# Patient Record
Sex: MALE | Race: WHITE | NOT HISPANIC OR LATINO
[De-identification: names, ages, dates, MRNs, and addresses within clinical notes are randomized per-mention and may not be internally consistent; named-entity substitution may affect disease eponyms.]

---

## 2017-01-04 ENCOUNTER — APPOINTMENT (OUTPATIENT)
Dept: PULMONOLOGY | Facility: CLINIC | Age: 27
End: 2017-01-04

## 2017-01-04 VITALS
WEIGHT: 132.5 LBS | TEMPERATURE: 96.44 F | OXYGEN SATURATION: 98 % | HEART RATE: 77 BPM | RESPIRATION RATE: 18 BRPM | BODY MASS INDEX: 21.4 KG/M2 | SYSTOLIC BLOOD PRESSURE: 114 MMHG | DIASTOLIC BLOOD PRESSURE: 70 MMHG

## 2017-01-11 ENCOUNTER — OTHER (OUTPATIENT)
Age: 27
End: 2017-01-11

## 2017-01-12 ENCOUNTER — APPOINTMENT (OUTPATIENT)
Dept: PULMONOLOGY | Facility: CLINIC | Age: 27
End: 2017-01-12

## 2017-01-12 VITALS
SYSTOLIC BLOOD PRESSURE: 102 MMHG | OXYGEN SATURATION: 98 % | HEART RATE: 73 BPM | TEMPERATURE: 96.62 F | RESPIRATION RATE: 16 BRPM | DIASTOLIC BLOOD PRESSURE: 70 MMHG | WEIGHT: 134.38 LBS | BODY MASS INDEX: 21.7 KG/M2

## 2017-01-25 ENCOUNTER — APPOINTMENT (OUTPATIENT)
Dept: PULMONOLOGY | Facility: CLINIC | Age: 27
End: 2017-01-25

## 2017-01-25 VITALS
WEIGHT: 138 LBS | OXYGEN SATURATION: 99 % | HEART RATE: 68 BPM | DIASTOLIC BLOOD PRESSURE: 72 MMHG | BODY MASS INDEX: 22.28 KG/M2 | SYSTOLIC BLOOD PRESSURE: 108 MMHG | TEMPERATURE: 97.34 F | RESPIRATION RATE: 18 BRPM

## 2017-01-30 ENCOUNTER — OTHER (OUTPATIENT)
Age: 27
End: 2017-01-30

## 2017-01-30 LAB — BACTERIA SPT CF RESP CULT: ABNORMAL

## 2017-02-08 ENCOUNTER — APPOINTMENT (OUTPATIENT)
Dept: PULMONOLOGY | Facility: CLINIC | Age: 27
End: 2017-02-08

## 2017-02-08 VITALS
OXYGEN SATURATION: 96 % | SYSTOLIC BLOOD PRESSURE: 102 MMHG | HEART RATE: 65 BPM | RESPIRATION RATE: 16 BRPM | TEMPERATURE: 97.52 F | WEIGHT: 132.8 LBS | DIASTOLIC BLOOD PRESSURE: 70 MMHG | BODY MASS INDEX: 21.44 KG/M2

## 2017-02-28 ENCOUNTER — APPOINTMENT (OUTPATIENT)
Dept: PULMONOLOGY | Facility: CLINIC | Age: 27
End: 2017-02-28

## 2017-02-28 VITALS
OXYGEN SATURATION: 98 % | HEIGHT: 65.98 IN | DIASTOLIC BLOOD PRESSURE: 80 MMHG | SYSTOLIC BLOOD PRESSURE: 128 MMHG | RESPIRATION RATE: 14 BRPM | WEIGHT: 139 LBS | TEMPERATURE: 98.2 F | HEART RATE: 79 BPM | BODY MASS INDEX: 22.34 KG/M2

## 2017-03-09 ENCOUNTER — OTHER (OUTPATIENT)
Age: 27
End: 2017-03-09

## 2017-03-09 LAB — ACID FAST STN SPT: NORMAL

## 2017-03-10 ENCOUNTER — APPOINTMENT (OUTPATIENT)
Dept: PULMONOLOGY | Facility: CLINIC | Age: 27
End: 2017-03-10

## 2017-03-15 ENCOUNTER — APPOINTMENT (OUTPATIENT)
Dept: PULMONOLOGY | Facility: CLINIC | Age: 27
End: 2017-03-15

## 2017-03-16 VITALS
TEMPERATURE: 97.52 F | SYSTOLIC BLOOD PRESSURE: 110 MMHG | OXYGEN SATURATION: 98 % | HEART RATE: 79 BPM | RESPIRATION RATE: 18 BRPM | BODY MASS INDEX: 21.69 KG/M2 | HEIGHT: 66 IN | DIASTOLIC BLOOD PRESSURE: 80 MMHG | WEIGHT: 135 LBS

## 2017-03-20 ENCOUNTER — OUTPATIENT (OUTPATIENT)
Dept: OUTPATIENT SERVICES | Facility: HOSPITAL | Age: 27
LOS: 1 days | End: 2017-03-20
Payer: COMMERCIAL

## 2017-03-20 DIAGNOSIS — E84.9 CYSTIC FIBROSIS, UNSPECIFIED: ICD-10-CM

## 2017-03-20 PROCEDURE — 77001 FLUOROGUIDE FOR VEIN DEVICE: CPT | Mod: 26,GC

## 2017-03-20 PROCEDURE — 76937 US GUIDE VASCULAR ACCESS: CPT | Mod: 26

## 2017-03-20 PROCEDURE — 36569 INSJ PICC 5 YR+ W/O IMAGING: CPT

## 2017-03-22 DIAGNOSIS — Z45.2 ENCOUNTER FOR ADJUSTMENT AND MANAGEMENT OF VASCULAR ACCESS DEVICE: ICD-10-CM

## 2017-03-22 DIAGNOSIS — E84.8 CYSTIC FIBROSIS WITH OTHER MANIFESTATIONS: ICD-10-CM

## 2017-03-24 ENCOUNTER — MEDICATION RENEWAL (OUTPATIENT)
Age: 27
End: 2017-03-24

## 2017-03-24 ENCOUNTER — OTHER (OUTPATIENT)
Age: 27
End: 2017-03-24

## 2017-03-30 ENCOUNTER — APPOINTMENT (OUTPATIENT)
Dept: PULMONOLOGY | Facility: CLINIC | Age: 27
End: 2017-03-30

## 2017-03-30 VITALS
WEIGHT: 137 LBS | DIASTOLIC BLOOD PRESSURE: 70 MMHG | HEART RATE: 89 BPM | TEMPERATURE: 97.9 F | OXYGEN SATURATION: 98 % | SYSTOLIC BLOOD PRESSURE: 120 MMHG | RESPIRATION RATE: 17 BRPM | BODY MASS INDEX: 22.02 KG/M2 | HEIGHT: 66 IN

## 2017-03-31 ENCOUNTER — APPOINTMENT (OUTPATIENT)
Dept: OTOLARYNGOLOGY | Facility: CLINIC | Age: 27
End: 2017-03-31

## 2017-03-31 VITALS
WEIGHT: 137 LBS | DIASTOLIC BLOOD PRESSURE: 83 MMHG | BODY MASS INDEX: 22.82 KG/M2 | SYSTOLIC BLOOD PRESSURE: 135 MMHG | HEIGHT: 65 IN | HEART RATE: 79 BPM

## 2017-03-31 DIAGNOSIS — R04.2 HEMOPTYSIS: ICD-10-CM

## 2017-04-07 ENCOUNTER — OTHER (OUTPATIENT)
Age: 27
End: 2017-04-07

## 2017-04-07 LAB — BACTERIA SPT CF RESP CULT: ABNORMAL

## 2017-04-10 ENCOUNTER — OTHER (OUTPATIENT)
Age: 27
End: 2017-04-10

## 2017-04-11 ENCOUNTER — OTHER (OUTPATIENT)
Age: 27
End: 2017-04-11

## 2017-04-11 LAB
ALBUMIN SERPL ELPH-MCNC: 4.4 G/DL
ALP BLD-CCNC: 172 U/L
ALT SERPL-CCNC: 67 U/L
ANION GAP SERPL CALC-SCNC: 12 MMOL/L
AST SERPL-CCNC: 48 U/L
BASOPHILS # BLD AUTO: 0.03 K/UL
BASOPHILS NFR BLD AUTO: 0.3 %
BILIRUB SERPL-MCNC: 0.7 MG/DL
BUN SERPL-MCNC: 14 MG/DL
CALCIUM SERPL-MCNC: 9.9 MG/DL
CHLORIDE SERPL-SCNC: 99 MMOL/L
CO2 SERPL-SCNC: 26 MMOL/L
CREAT SERPL-MCNC: 0.96 MG/DL
EOSINOPHIL # BLD AUTO: 0.24 K/UL
EOSINOPHIL NFR BLD AUTO: 2.5 %
GLUCOSE SERPL-MCNC: 98 MG/DL
HCT VFR BLD CALC: 43.8 %
HGB BLD-MCNC: 15 G/DL
IMM GRANULOCYTES NFR BLD AUTO: 0.1 %
INR PPP: 1.13 RATIO
LYMPHOCYTES # BLD AUTO: 1.34 K/UL
LYMPHOCYTES NFR BLD AUTO: 13.8 %
MAN DIFF?: NORMAL
MCHC RBC-ENTMCNC: 29.2 PG
MCHC RBC-ENTMCNC: 34.2 GM/DL
MCV RBC AUTO: 85.4 FL
MONOCYTES # BLD AUTO: 0.68 K/UL
MONOCYTES NFR BLD AUTO: 7 %
NEUTROPHILS # BLD AUTO: 7.4 K/UL
NEUTROPHILS NFR BLD AUTO: 76.3 %
PLATELET # BLD AUTO: 251 K/UL
POTASSIUM SERPL-SCNC: 4.6 MMOL/L
PROT SERPL-MCNC: 8.3 G/DL
PT BLD: 12.8 SEC
RBC # BLD: 5.13 M/UL
RBC # FLD: 13 %
SODIUM SERPL-SCNC: 137 MMOL/L
WBC # FLD AUTO: 9.7 K/UL

## 2017-04-13 ENCOUNTER — APPOINTMENT (OUTPATIENT)
Dept: PULMONOLOGY | Facility: CLINIC | Age: 27
End: 2017-04-13

## 2017-04-13 ENCOUNTER — APPOINTMENT (OUTPATIENT)
Dept: OTOLARYNGOLOGY | Facility: CLINIC | Age: 27
End: 2017-04-13

## 2017-04-18 ENCOUNTER — APPOINTMENT (OUTPATIENT)
Dept: PULMONOLOGY | Facility: CLINIC | Age: 27
End: 2017-04-18

## 2017-04-18 VITALS
DIASTOLIC BLOOD PRESSURE: 84 MMHG | RESPIRATION RATE: 14 BRPM | OXYGEN SATURATION: 97 % | SYSTOLIC BLOOD PRESSURE: 122 MMHG | WEIGHT: 136 LBS | BODY MASS INDEX: 22.66 KG/M2 | HEART RATE: 74 BPM | TEMPERATURE: 97.6 F | HEIGHT: 65 IN

## 2017-04-19 ENCOUNTER — OTHER (OUTPATIENT)
Age: 27
End: 2017-04-19

## 2017-04-19 LAB
ALBUMIN SERPL ELPH-MCNC: 4.4 G/DL
ALP BLD-CCNC: 159 U/L
ALT SERPL-CCNC: 62 U/L
ANION GAP SERPL CALC-SCNC: 17 MMOL/L
AST SERPL-CCNC: 35 U/L
BASOPHILS # BLD AUTO: 0.01 K/UL
BASOPHILS NFR BLD AUTO: 0.1 %
BILIRUB SERPL-MCNC: 0.3 MG/DL
BUN SERPL-MCNC: 11 MG/DL
CALCIUM SERPL-MCNC: 9.7 MG/DL
CHLORIDE SERPL-SCNC: 94 MMOL/L
CO2 SERPL-SCNC: 27 MMOL/L
CREAT SERPL-MCNC: 1.08 MG/DL
EOSINOPHIL # BLD AUTO: 0.13 K/UL
EOSINOPHIL NFR BLD AUTO: 1.2 %
GLUCOSE SERPL-MCNC: 131 MG/DL
HCT VFR BLD CALC: 42.1 %
HGB BLD-MCNC: 14.2 G/DL
IMM GRANULOCYTES NFR BLD AUTO: 0.2 %
LYMPHOCYTES # BLD AUTO: 2.19 K/UL
LYMPHOCYTES NFR BLD AUTO: 20.9 %
MAN DIFF?: NORMAL
MCHC RBC-ENTMCNC: 29 PG
MCHC RBC-ENTMCNC: 33.7 GM/DL
MCV RBC AUTO: 86.1 FL
MONOCYTES # BLD AUTO: 0.9 K/UL
MONOCYTES NFR BLD AUTO: 8.6 %
NEUTROPHILS # BLD AUTO: 7.25 K/UL
NEUTROPHILS NFR BLD AUTO: 69 %
PLATELET # BLD AUTO: 308 K/UL
POTASSIUM SERPL-SCNC: 4.3 MMOL/L
PROT SERPL-MCNC: 8.7 G/DL
RAPID RVP RESULT: NOT DETECTED
RBC # BLD: 4.89 M/UL
RBC # FLD: 12.9 %
SODIUM SERPL-SCNC: 138 MMOL/L
WBC # FLD AUTO: 10.5 K/UL

## 2017-04-20 ENCOUNTER — MEDICATION RENEWAL (OUTPATIENT)
Age: 27
End: 2017-04-20

## 2017-04-26 ENCOUNTER — MEDICATION RENEWAL (OUTPATIENT)
Age: 27
End: 2017-04-26

## 2017-04-26 LAB — ACID FAST STN SPT: NORMAL

## 2017-04-27 LAB — BACTERIA SPT CF RESP CULT: ABNORMAL

## 2017-05-15 ENCOUNTER — APPOINTMENT (OUTPATIENT)
Dept: ENDOCRINOLOGY | Facility: CLINIC | Age: 27
End: 2017-05-15

## 2017-05-15 ENCOUNTER — RESULT CHARGE (OUTPATIENT)
Age: 27
End: 2017-05-15

## 2017-05-15 LAB — GLUCOSE BLDC GLUCOMTR-MCNC: 7.7

## 2017-05-16 ENCOUNTER — MEDICATION RENEWAL (OUTPATIENT)
Age: 27
End: 2017-05-16

## 2017-05-17 ENCOUNTER — MEDICATION RENEWAL (OUTPATIENT)
Age: 27
End: 2017-05-17

## 2017-05-18 ENCOUNTER — OTHER (OUTPATIENT)
Age: 27
End: 2017-05-18

## 2017-05-22 ENCOUNTER — APPOINTMENT (OUTPATIENT)
Dept: PULMONOLOGY | Facility: CLINIC | Age: 27
End: 2017-05-22

## 2017-05-22 VITALS
HEART RATE: 78 BPM | BODY MASS INDEX: 22.49 KG/M2 | HEIGHT: 65 IN | RESPIRATION RATE: 16 BRPM | DIASTOLIC BLOOD PRESSURE: 80 MMHG | WEIGHT: 135 LBS | TEMPERATURE: 99 F | SYSTOLIC BLOOD PRESSURE: 120 MMHG

## 2017-05-22 RX ORDER — AZTREONAM 1 G/1
1 INJECTION, POWDER, LYOPHILIZED, FOR SOLUTION INTRAMUSCULAR; INTRAVENOUS
Refills: 0 | Status: DISCONTINUED | COMMUNITY
End: 2017-05-22

## 2017-05-22 RX ORDER — CLOTRIMAZOLE 10 MG/1
10 LOZENGE ORAL
Qty: 70 | Refills: 0 | Status: DISCONTINUED | COMMUNITY
Start: 2017-03-24 | End: 2017-05-22

## 2017-05-22 RX ORDER — BLOOD-GLUCOSE METER
KIT MISCELLANEOUS
Qty: 1 | Refills: 0 | Status: ACTIVE | COMMUNITY
Start: 2017-05-18

## 2017-05-22 RX ORDER — PREDNISONE 20 MG/1
20 TABLET ORAL
Qty: 10 | Refills: 0 | Status: DISCONTINUED | COMMUNITY
Start: 2017-03-01 | End: 2017-05-22

## 2017-05-22 RX ORDER — LEVOFLOXACIN 750 MG/1
750 TABLET, FILM COATED ORAL DAILY
Qty: 14 | Refills: 0 | Status: COMPLETED | COMMUNITY
Start: 2017-05-22 | End: 2017-06-05

## 2017-05-22 RX ORDER — LINEZOLID 600 MG/1
600 TABLET, FILM COATED ORAL
Qty: 28 | Refills: 0 | Status: DISCONTINUED | COMMUNITY
Start: 2017-03-15 | End: 2017-05-22

## 2017-06-02 ENCOUNTER — CLINICAL ADVICE (OUTPATIENT)
Age: 27
End: 2017-06-02

## 2017-06-02 ENCOUNTER — MEDICATION RENEWAL (OUTPATIENT)
Age: 27
End: 2017-06-02

## 2017-06-02 LAB — BACTERIA SPT CF RESP CULT: ABNORMAL

## 2017-06-22 ENCOUNTER — EMERGENCY (EMERGENCY)
Facility: HOSPITAL | Age: 27
LOS: 1 days | Discharge: HOME | End: 2017-06-22
Attending: EMERGENCY MEDICINE | Admitting: EMERGENCY MEDICINE
Payer: OTHER MISCELLANEOUS

## 2017-06-22 VITALS
TEMPERATURE: 98 F | HEART RATE: 92 BPM | OXYGEN SATURATION: 98 % | RESPIRATION RATE: 18 BRPM | DIASTOLIC BLOOD PRESSURE: 80 MMHG | SYSTOLIC BLOOD PRESSURE: 140 MMHG

## 2017-06-22 PROCEDURE — 99284 EMERGENCY DEPT VISIT MOD MDM: CPT | Mod: 25

## 2017-06-22 PROCEDURE — 70486 CT MAXILLOFACIAL W/O DYE: CPT | Mod: 26

## 2017-06-22 RX ORDER — AMOXICILLIN 250 MG/5ML
1 SUSPENSION, RECONSTITUTED, ORAL (ML) ORAL
Qty: 14 | Refills: 0 | OUTPATIENT
Start: 2017-06-22 | End: 2017-06-29

## 2017-06-22 RX ORDER — TETANUS TOXOID, REDUCED DIPHTHERIA TOXOID AND ACELLULAR PERTUSSIS VACCINE, ADSORBED 5; 2.5; 8; 8; 2.5 [IU]/.5ML; [IU]/.5ML; UG/.5ML; UG/.5ML; UG/.5ML
0.5 SUSPENSION INTRAMUSCULAR ONCE
Qty: 0 | Refills: 0 | Status: COMPLETED | OUTPATIENT
Start: 2017-06-22 | End: 2017-06-22

## 2017-06-22 RX ORDER — OXYMETAZOLINE HYDROCHLORIDE 0.5 MG/ML
2 SPRAY NASAL
Qty: 1 | Refills: 0 | OUTPATIENT
Start: 2017-06-22 | End: 2017-06-25

## 2017-06-22 RX ADMIN — TETANUS TOXOID, REDUCED DIPHTHERIA TOXOID AND ACELLULAR PERTUSSIS VACCINE, ADSORBED 0.5 MILLILITER(S): 5; 2.5; 8; 8; 2.5 SUSPENSION INTRAMUSCULAR at 14:46

## 2017-06-22 NOTE — ED PROVIDER NOTE - ATTENDING CONTRIBUTION TO CARE
I , Wandy Collier M.D. have examined the patient and confirmed the essential components of the history, physical examination, diagnosis, and treatment plan. I agree with the patient's care as documented by the PA and amended herein by me. See note above for complete details of service.  27 yo M Hx Cystic Fibrosis who presented to the ED for nasal lac, swelling after a spring loaded door arm swung back and hit him in the face. Pt had subsequent epistaxis which has since resolved. No LOC or other injuries. Exam reveals lac to nasal bridge as above. No nasal septal hematomas on exam. Plan CT max face ro fx. Pt request Plastics for repair. Symptom control/supportive care. Reassess.

## 2017-06-22 NOTE — ED PROVIDER NOTE - CARE PLAN
Principal Discharge DX:	Laceration of nose Principal Discharge DX:	Laceration of nose  Instructions for follow-up, activity and diet:	FOLLOW UP WITH YOUR ENT within the week. DO NOT BLOW your nose. Refrain from drinking through straws. Follow up with your Primary medical doctor within 48-72 hours for wound check. Keep sutures covered and dry for 24 hours then clean with soap and water daily.  Apply bacitracin and cover.  Any increased pain, redness, streaking (red lines), swelling, fever, chills return to ER. Take Motrin 600mg every 8hrs with food for pain. Take all of your medications as previously prescribed.  Secondary Diagnosis:	Nasal bone fracture Principal Discharge DX:	Laceration of nose  Instructions for follow-up, activity and diet:	FOLLOW UP WITH YOUR ENT within the week. Follow up with Plastic surgery next week Dr. THOMAS call [phone] to make an appointment. DO NOT BLOW your nose. Refrain from drinking through straws. Follow up with your Primary medical doctor within 48-72 hours for wound check. Keep sutures covered and dry.  Any increased pain, redness, streaking (red lines), swelling, fever, chills return to ER. Take Motrin 600mg every 8hrs with food for pain. Take Amoxicillin as prescribed. Take all of your medications as previously prescribed.  Secondary Diagnosis:	Nasal bone fracture Principal Discharge DX:	Laceration of nose  Instructions for follow-up, activity and diet:	FOLLOW UP WITH YOUR ENT within the week. Follow up with Plastic surgery next week Dr. THOMAS call [phone] to make an appointment. DO NOT BLOW your nose. Refrain from drinking through straws. Follow up with your Primary medical doctor within 48-72 hours for wound check. Keep sutures covered and dry.  Any increased pain, redness, streaking (red lines), swelling, fever, chills return to ER. Take Motrin 600mg every 8hrs with food for pain.  Percocet 1 tablets every 6 hrs as needed for breakthrough discomfort- caution drowsiness while taking this medication- do not drive or operate heavy machinery.  Take Amoxicillin as prescribed. Take all of your medications as previously prescribed.  Secondary Diagnosis:	Nasal bone fracture Principal Discharge DX:	Laceration of nose  Instructions for follow-up, activity and diet:	FOLLOW UP WITH YOUR ENT within the week. Follow up with Plastic surgery next week Dr. THOMAS call [phone] to make an appointment. DO NOT BLOW your nose. Refrain from drinking through straws. Follow up with your Primary medical doctor within 48-72 hours for wound check. Keep sutures covered and dry.  Any increased pain, redness, streaking (red lines), swelling, fever, chills return to ER. Take Motrin 600mg every 8hrs with food for pain.  Percocet 1 tablets every 6 hrs as needed for breakthrough discomfort- caution drowsiness while taking this medication- do not drive or operate heavy machinery. Use Afrin as prescribed for 3 days. Take Amoxicillin as prescribed. Take all of your medications as previously prescribed.  Secondary Diagnosis:	Nasal bone fracture Principal Discharge DX:	Laceration of nose  Instructions for follow-up, activity and diet:	FOLLOW UP WITH YOUR ENT within the week. Follow up with Plastic surgery next week Dr. THOMAS call [phone] to make an appointment. DO NOT BLOW your nose. Refrain from drinking through straws. Follow up with your Primary medical doctor within 48-72 hours for wound check. Keep sutures covered and dry.  Any increased pain, redness, streaking (red lines), swelling, fever, chills return to ER. Take Motrin 600mg every 8hrs with food for pain.  Percocet 1 tablets every 6 hrs as needed for breakthrough discomfort- caution drowsiness while taking this medication- do not drive or operate heavy machinery. Use Afrin as prescribed for 3 days. Take Amoxicillin as prescribed. Take all of your medications as previously prescribed.  Secondary Diagnosis:	Nasal bone fracture

## 2017-06-22 NOTE — ED PROVIDER NOTE - PLAN OF CARE
FOLLOW UP WITH YOUR ENT within the week. DO NOT BLOW your nose. Refrain from drinking through straws. Follow up with your Primary medical doctor within 48-72 hours for wound check. Keep sutures covered and dry for 24 hours then clean with soap and water daily.  Apply bacitracin and cover.  Any increased pain, redness, streaking (red lines), swelling, fever, chills return to ER. Take Motrin 600mg every 8hrs with food for pain. Take all of your medications as previously prescribed. FOLLOW UP WITH YOUR ENT within the week. Follow up with Plastic surgery next week Dr. THOMAS call [phone] to make an appointment. DO NOT BLOW your nose. Refrain from drinking through straws. Follow up with your Primary medical doctor within 48-72 hours for wound check. Keep sutures covered and dry.  Any increased pain, redness, streaking (red lines), swelling, fever, chills return to ER. Take Motrin 600mg every 8hrs with food for pain. Take Amoxicillin as prescribed. Take all of your medications as previously prescribed. FOLLOW UP WITH YOUR ENT within the week. Follow up with Plastic surgery next week Dr. THOMAS call [phone] to make an appointment. DO NOT BLOW your nose. Refrain from drinking through straws. Follow up with your Primary medical doctor within 48-72 hours for wound check. Keep sutures covered and dry.  Any increased pain, redness, streaking (red lines), swelling, fever, chills return to ER. Take Motrin 600mg every 8hrs with food for pain.  Percocet 1 tablets every 6 hrs as needed for breakthrough discomfort- caution drowsiness while taking this medication- do not drive or operate heavy machinery.  Take Amoxicillin as prescribed. Take all of your medications as previously prescribed. FOLLOW UP WITH YOUR ENT within the week. Follow up with Plastic surgery next week Dr. THOMAS call [phone] to make an appointment. DO NOT BLOW your nose. Refrain from drinking through straws. Follow up with your Primary medical doctor within 48-72 hours for wound check. Keep sutures covered and dry.  Any increased pain, redness, streaking (red lines), swelling, fever, chills return to ER. Take Motrin 600mg every 8hrs with food for pain.  Percocet 1 tablets every 6 hrs as needed for breakthrough discomfort- caution drowsiness while taking this medication- do not drive or operate heavy machinery. Use Afrin as prescribed for 3 days. Take Amoxicillin as prescribed. Take all of your medications as previously prescribed.

## 2017-06-22 NOTE — ED PROVIDER NOTE - MEDICAL DECISION MAKING DETAILS
25 yo male with PMHX CF presents with laceration to nose after metal door mayela hit patient. Pt requesting plastics   Plan: Ct max fac. pain control. call plastics

## 2017-06-22 NOTE — ED PROVIDER NOTE - PHYSICAL EXAMINATION
+ 1.5-2cm laceration to bridge of nose. not actively bleeding, no redness no discharge.  No snyder signs, no raccoon eyes. EOMI.  + blood in nares b/l, limited exam, no visualization of septal hematoma.   No injury noted to others

## 2017-06-22 NOTE — ED PROVIDER NOTE - OBJECTIVE STATEMENT
27 yo male with h/o CF presents to the ED c/o laceration to nose, pain, and headache after pt was fixing a door and the springloaded metal door stop, swung down and hit him in the face 1 hour ago. Pt reports blood from nostrils and blood from lac. Pt unsure when last tetanus was. Denies LOC, visual changes/ disturbances, fever, chills, n/v, cp, sob, abdominal pain, social hx.

## 2017-06-22 NOTE — ED ADULT TRIAGE NOTE - CHIEF COMPLAINT QUOTE
p/t got hit with metal bar while fixing a door moderate amount of bleeding noted denies any airway issues noted

## 2017-06-23 ENCOUNTER — TRANSCRIPTION ENCOUNTER (OUTPATIENT)
Age: 27
End: 2017-06-23

## 2017-07-14 ENCOUNTER — APPOINTMENT (OUTPATIENT)
Dept: OTOLARYNGOLOGY | Facility: CLINIC | Age: 27
End: 2017-07-14

## 2017-07-27 ENCOUNTER — MEDICATION RENEWAL (OUTPATIENT)
Age: 27
End: 2017-07-27

## 2017-08-17 ENCOUNTER — MEDICATION RENEWAL (OUTPATIENT)
Age: 27
End: 2017-08-17

## 2017-08-25 ENCOUNTER — APPOINTMENT (OUTPATIENT)
Dept: PULMONOLOGY | Facility: CLINIC | Age: 27
End: 2017-08-25
Payer: SUBSIDIZED

## 2017-08-25 PROCEDURE — 93000 ELECTROCARDIOGRAM COMPLETE: CPT

## 2017-08-25 PROCEDURE — 36415 COLL VENOUS BLD VENIPUNCTURE: CPT

## 2017-08-25 PROCEDURE — 99215 OFFICE O/P EST HI 40 MIN: CPT | Mod: 25

## 2017-08-25 PROCEDURE — 94010 BREATHING CAPACITY TEST: CPT

## 2017-08-26 ENCOUNTER — INPATIENT (INPATIENT)
Facility: HOSPITAL | Age: 27
LOS: 4 days | Discharge: HOME HEALTH | End: 2017-08-31
Attending: INTERNAL MEDICINE | Admitting: INTERNAL MEDICINE
Payer: COMMERCIAL

## 2017-08-26 VITALS
DIASTOLIC BLOOD PRESSURE: 55 MMHG | WEIGHT: 134.48 LBS | HEART RATE: 60 BPM | SYSTOLIC BLOOD PRESSURE: 106 MMHG | TEMPERATURE: 98 F | OXYGEN SATURATION: 99 % | HEIGHT: 65 IN | RESPIRATION RATE: 17 BRPM

## 2017-08-26 DIAGNOSIS — E84.19 CYSTIC FIBROSIS WITH OTHER INTESTINAL MANIFESTATIONS: ICD-10-CM

## 2017-08-26 DIAGNOSIS — E84.0 CYSTIC FIBROSIS WITH PULMONARY MANIFESTATIONS: ICD-10-CM

## 2017-08-26 DIAGNOSIS — Z29.9 ENCOUNTER FOR PROPHYLACTIC MEASURES, UNSPECIFIED: ICD-10-CM

## 2017-08-26 DIAGNOSIS — E84.9 CYSTIC FIBROSIS, UNSPECIFIED: ICD-10-CM

## 2017-08-26 DIAGNOSIS — E11.9 TYPE 2 DIABETES MELLITUS WITHOUT COMPLICATIONS: ICD-10-CM

## 2017-08-26 RX ORDER — INSULIN LISPRO 100/ML
2 VIAL (ML) SUBCUTANEOUS
Qty: 0 | Refills: 0 | Status: DISCONTINUED | OUTPATIENT
Start: 2017-08-26 | End: 2017-08-29

## 2017-08-26 RX ORDER — DIPHENHYDRAMINE HCL 50 MG
25 CAPSULE ORAL ONCE
Qty: 0 | Refills: 0 | Status: DISCONTINUED | OUTPATIENT
Start: 2017-08-26 | End: 2017-08-27

## 2017-08-26 RX ORDER — DEXTROSE 50 % IN WATER 50 %
25 SYRINGE (ML) INTRAVENOUS ONCE
Qty: 0 | Refills: 0 | Status: DISCONTINUED | OUTPATIENT
Start: 2017-08-26 | End: 2017-08-31

## 2017-08-26 RX ORDER — DEXTROSE 50 % IN WATER 50 %
1 SYRINGE (ML) INTRAVENOUS ONCE
Qty: 0 | Refills: 0 | Status: DISCONTINUED | OUTPATIENT
Start: 2017-08-26 | End: 2017-08-31

## 2017-08-26 RX ORDER — SODIUM CHLORIDE 9 MG/ML
1000 INJECTION, SOLUTION INTRAVENOUS
Qty: 0 | Refills: 0 | Status: DISCONTINUED | OUTPATIENT
Start: 2017-08-26 | End: 2017-08-31

## 2017-08-26 RX ORDER — VANCOMYCIN HCL 1 G
1000 VIAL (EA) INTRAVENOUS ONCE
Qty: 0 | Refills: 0 | Status: COMPLETED | OUTPATIENT
Start: 2017-08-26 | End: 2017-08-27

## 2017-08-26 RX ORDER — LIPASE/PROTEASE/AMYLASE 16-48-48K
6 CAPSULE,DELAYED RELEASE (ENTERIC COATED) ORAL
Qty: 0 | Refills: 0 | Status: DISCONTINUED | OUTPATIENT
Start: 2017-08-26 | End: 2017-08-31

## 2017-08-26 RX ORDER — GLUCAGON INJECTION, SOLUTION 0.5 MG/.1ML
1 INJECTION, SOLUTION SUBCUTANEOUS ONCE
Qty: 0 | Refills: 0 | Status: DISCONTINUED | OUTPATIENT
Start: 2017-08-26 | End: 2017-08-31

## 2017-08-26 RX ORDER — INSULIN LISPRO 100/ML
VIAL (ML) SUBCUTANEOUS AT BEDTIME
Qty: 0 | Refills: 0 | Status: DISCONTINUED | OUTPATIENT
Start: 2017-08-26 | End: 2017-08-31

## 2017-08-26 RX ORDER — FAMOTIDINE 10 MG/ML
20 INJECTION INTRAVENOUS DAILY
Qty: 0 | Refills: 0 | Status: DISCONTINUED | OUTPATIENT
Start: 2017-08-26 | End: 2017-08-31

## 2017-08-26 RX ORDER — DORNASE ALFA 1 MG/ML
1 SOLUTION RESPIRATORY (INHALATION)
Qty: 0 | Refills: 0 | Status: DISCONTINUED | OUTPATIENT
Start: 2017-08-26 | End: 2017-08-31

## 2017-08-26 RX ORDER — MONTELUKAST 4 MG/1
10 TABLET, CHEWABLE ORAL DAILY
Qty: 0 | Refills: 0 | Status: DISCONTINUED | OUTPATIENT
Start: 2017-08-26 | End: 2017-08-31

## 2017-08-26 RX ORDER — DEXTROSE 50 % IN WATER 50 %
12.5 SYRINGE (ML) INTRAVENOUS ONCE
Qty: 0 | Refills: 0 | Status: DISCONTINUED | OUTPATIENT
Start: 2017-08-26 | End: 2017-08-31

## 2017-08-26 RX ORDER — MEROPENEM 1 G/30ML
INJECTION INTRAVENOUS
Qty: 0 | Refills: 0 | Status: DISCONTINUED | OUTPATIENT
Start: 2017-08-27 | End: 2017-08-31

## 2017-08-26 RX ORDER — MEROPENEM 1 G/30ML
2000 INJECTION INTRAVENOUS ONCE
Qty: 0 | Refills: 0 | Status: COMPLETED | OUTPATIENT
Start: 2017-08-26 | End: 2017-08-27

## 2017-08-26 RX ORDER — MEROPENEM 1 G/30ML
2000 INJECTION INTRAVENOUS EVERY 8 HOURS
Qty: 0 | Refills: 0 | Status: DISCONTINUED | OUTPATIENT
Start: 2017-08-27 | End: 2017-08-31

## 2017-08-26 RX ORDER — AZTREONAM 2 G
2000 VIAL (EA) INJECTION EVERY 8 HOURS
Qty: 0 | Refills: 0 | Status: DISCONTINUED | OUTPATIENT
Start: 2017-08-27 | End: 2017-08-31

## 2017-08-26 RX ORDER — ALBUTEROL 90 UG/1
2 AEROSOL, METERED ORAL EVERY 6 HOURS
Qty: 0 | Refills: 0 | Status: DISCONTINUED | OUTPATIENT
Start: 2017-08-26 | End: 2017-08-31

## 2017-08-26 RX ORDER — AZTREONAM 2 G
VIAL (EA) INJECTION
Qty: 0 | Refills: 0 | Status: DISCONTINUED | OUTPATIENT
Start: 2017-08-27 | End: 2017-08-31

## 2017-08-26 RX ORDER — VANCOMYCIN HCL 1 G
VIAL (EA) INTRAVENOUS
Qty: 0 | Refills: 0 | Status: DISCONTINUED | OUTPATIENT
Start: 2017-08-27 | End: 2017-08-28

## 2017-08-26 RX ORDER — VANCOMYCIN HCL 1 G
1000 VIAL (EA) INTRAVENOUS EVERY 12 HOURS
Qty: 0 | Refills: 0 | Status: DISCONTINUED | OUTPATIENT
Start: 2017-08-27 | End: 2017-08-28

## 2017-08-26 RX ORDER — TOBRAMYCIN SULFATE 40 MG/ML
300 VIAL (ML) INJECTION
Qty: 0 | Refills: 0 | Status: DISCONTINUED | OUTPATIENT
Start: 2017-08-26 | End: 2017-08-28

## 2017-08-26 RX ORDER — SODIUM CHLORIDE 9 MG/ML
4 INJECTION INTRAMUSCULAR; INTRAVENOUS; SUBCUTANEOUS
Qty: 0 | Refills: 0 | Status: DISCONTINUED | OUTPATIENT
Start: 2017-08-26 | End: 2017-08-31

## 2017-08-26 RX ORDER — AZTREONAM 2 G
2000 VIAL (EA) INJECTION ONCE
Qty: 0 | Refills: 0 | Status: COMPLETED | OUTPATIENT
Start: 2017-08-26 | End: 2017-08-26

## 2017-08-26 RX ORDER — INSULIN LISPRO 100/ML
VIAL (ML) SUBCUTANEOUS
Qty: 0 | Refills: 0 | Status: DISCONTINUED | OUTPATIENT
Start: 2017-08-26 | End: 2017-08-31

## 2017-08-26 RX ADMIN — Medication 50 MILLIGRAM(S): at 23:59

## 2017-08-26 NOTE — H&P ADULT - PROBLEM SELECTOR PLAN 1
-worsening cough for 1 week likely 2/2 mild CF exacerbation  -Per Dr. Wesley, will not give PO abx due to concern for high tx failure rate. Will start IV meropenem, aztreonam and vancomycin so patient may be optimized prior to his trip in september and for pending research trial -worsening cough for 1 week likely 2/2 mild CF exacerbation  -Per Dr. Wesley, will not give PO abx due to concern for high tx failure rate. Will start IV meropenem, aztreonam and vancomycin so patient may be optimized prior to his trip in september and for pending research trial  -Per Dr. Wesley, plan to place PICC line. Once PICC line is in, set up discharge for total of 14 days of IV antibiotics to stop on 9/8 -worsening cough for 1 week likely 2/2 mild CF exacerbation  -Per Dr. Wesley, will not give PO abx due to concern for high tx failure rate. Will start IV meropenem, aztreonam and vancomycin so patient may be optimized prior to his trip in september and for pending research trial  -Per Dr. Wesley, plan to place PICC line. Once PICC line is in, set up discharge for total of 14 days of IV antibiotics to stop on 9/8  -c/w home CF medications -worsening cough for 1 week likely 2/2 mild CF exacerbation  -Per Dr. Wesley, will not give PO abx due to concern for high tx failure rate. Will start IV meropenem, aztreonam and vancomycin so patient may be optimized prior to his trip in september and for pending research trial  -Per Dr. Wesley, plan to place PICC line. Once PICC line is in, set up discharge for total of 14 days of IV antibiotics to stop on 9/8  -check sputum culture  -c/w home CF medications

## 2017-08-26 NOTE — H&P ADULT - NSHPPHYSICALEXAM_GEN_ALL_CORE
Vital Signs Last 24 Hrs  T(C): 36.7 (26 Aug 2017 20:14), Max: 36.7 (26 Aug 2017 20:14)  T(F): 98 (26 Aug 2017 20:14), Max: 98 (26 Aug 2017 20:14)  HR: 60 (26 Aug 2017 20:14) (60 - 60)  BP: 106/55 (26 Aug 2017 20:14) (106/55 - 106/55)  BP(mean): --  RR: 17 (26 Aug 2017 20:14) (17 - 17)  SpO2: 99% (26 Aug 2017 20:14) (99% - 99%)      PHYSICAL EXAM:  GENERAL: NAD, well-groomed, well-developed  HEAD:  Atraumatic, Normocephalic  EYES: EOMI, PERRLA, conjunctiva and sclera clear  ENMT: No tonsillar erythema, exudates, or enlargement; Moist mucous membranes,   NECK: Supple, No JVD, Normal thyroid  HEART: Regular rate and rhythm; No murmurs, rubs, or gallops  RESPIRATORY: mild b/l crackle   ABDOMEN: Soft, Nontender, Nondistended; Bowel sounds present  NEUROLOGY: A&Ox3, nonfocal, CN II-XII grossly intact, sensation intact, no gait abnormalities bilaterally;  EXTREMITIES:  2+ Peripheral Pulses, No clubbing, cyanosis, or edema  SKIN: warm, dry, normal color, no rash or abnormal lesions

## 2017-08-26 NOTE — H&P ADULT - NSHPREVIEWOFSYSTEMS_GEN_ALL_CORE
REVIEW OF SYSTEMS:  CONSTITUTIONAL: No fever, weight loss, or fatigue  EYES: No eye pain, visual disturbances, or discharge  ENMT:  No difficulty hearing, tinnitus, vertigo; No sinus or throat pain  NECK: No pain or stiffness  RESPIRATORY: + cough, No wheezing, chills or hemoptysis; No shortness of breath  CARDIOVASCULAR: No chest pain, palpitations, dizziness, or leg swelling  GASTROINTESTINAL: No abdominal or epigastric pain. No nausea, vomiting, or hematemesis; No diarrhea or constipation. No melena or hematochezia.  GENITOURINARY: No dysuria, frequency  NEUROLOGICAL: No headaches, memory loss, loss of strength, numbness, or tremors  SKIN: No itching, burning, rashes, or lesions   LYMPH NODES: No enlarged glands  ENDOCRINE: No heat or cold intolerance  MUSCULOSKELETAL: No joint pain or swelling; No muscle, back, or extremity pain  PSYCHIATRIC: No depression, anxiety

## 2017-08-26 NOTE — H&P ADULT - HISTORY OF PRESENT ILLNESS
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 At baseline, patient has productive cough of yellow/light green sputum from CF. Sputum has now progressed to dark green but with no blood. Patient reports the cough with more frequency now and causes him to occasionally vomit up sputum at night from excessive cough. Patient reported worsening cough to Dr. 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 At baseline, patient has productive cough of yellow/light green sputum from CF. Sputum has now progressed to dark green but with no blood. Patient reports the cough with more frequency now and causes him to occasionally vomit up sputum at night from excessive cough. Patient reported worsening cough to Dr. Wesley on 8/25/17 during the scheduled research visit (vertex [facility]59 trial) and endorses that he is also planning to travel out of country for vacation on 9/9. Dr. Wesley advised the patient to be admitted to the hospital to start IV antibiotics so he could be optimized prior to his trip away and so he is eligible for enrollment in clinical trial. 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 At baseline, patient has productive cough of yellow/light green sputum from CF. Sputum has now progressed to dark green but with no blood. Patient reports the cough with more frequency now and causes him to occasionally vomit up sputum at night from excessive cough. Patient reports that he usually have pulmonary infection 2x each year. He reports his recent outpt PFT was FEV1/FVC=54% on 8/25 which was suboptimal compared to his baseline. Patient reported worsening cough to Dr. Wesley on 8/25/17 during the scheduled research visit (vertex 659 trial) and endorses that he is also planning to travel out of country for vacation on 9/9. Dr. Wesley advised the patient to be admitted to the hospital to start IV antibiotics so he could be optimized prior to his trip away and so he is eligible for enrollment in clinical trial.

## 2017-08-27 ENCOUNTER — TRANSCRIPTION ENCOUNTER (OUTPATIENT)
Age: 27
End: 2017-08-27

## 2017-08-27 LAB
ALBUMIN SERPL ELPH-MCNC: 3.5 G/DL — SIGNIFICANT CHANGE UP (ref 3.3–5)
ALP SERPL-CCNC: 113 U/L — SIGNIFICANT CHANGE UP (ref 40–120)
ALT FLD-CCNC: 40 U/L — SIGNIFICANT CHANGE UP (ref 4–41)
AST SERPL-CCNC: 23 U/L — SIGNIFICANT CHANGE UP (ref 4–40)
BASOPHILS # BLD AUTO: 0.04 K/UL — SIGNIFICANT CHANGE UP (ref 0–0.2)
BASOPHILS NFR BLD AUTO: 0.5 % — SIGNIFICANT CHANGE UP (ref 0–2)
BILIRUB SERPL-MCNC: 0.3 MG/DL — SIGNIFICANT CHANGE UP (ref 0.2–1.2)
BUN SERPL-MCNC: 14 MG/DL — SIGNIFICANT CHANGE UP (ref 7–23)
CALCIUM SERPL-MCNC: 9 MG/DL — SIGNIFICANT CHANGE UP (ref 8.4–10.5)
CHLORIDE SERPL-SCNC: 99 MMOL/L — SIGNIFICANT CHANGE UP (ref 98–107)
CO2 SERPL-SCNC: 24 MMOL/L — SIGNIFICANT CHANGE UP (ref 22–31)
CREAT SERPL-MCNC: 0.86 MG/DL — SIGNIFICANT CHANGE UP (ref 0.5–1.3)
EOSINOPHIL # BLD AUTO: 0.15 K/UL — SIGNIFICANT CHANGE UP (ref 0–0.5)
EOSINOPHIL NFR BLD AUTO: 1.9 % — SIGNIFICANT CHANGE UP (ref 0–6)
GLUCOSE SERPL-MCNC: 144 MG/DL — HIGH (ref 70–99)
HBA1C BLD-MCNC: 7.2 % — HIGH (ref 4–5.6)
HCT VFR BLD CALC: 41.3 % — SIGNIFICANT CHANGE UP (ref 39–50)
HGB BLD-MCNC: 14.1 G/DL — SIGNIFICANT CHANGE UP (ref 13–17)
IMM GRANULOCYTES # BLD AUTO: 0.02 # — SIGNIFICANT CHANGE UP
IMM GRANULOCYTES NFR BLD AUTO: 0.3 % — SIGNIFICANT CHANGE UP (ref 0–1.5)
LYMPHOCYTES # BLD AUTO: 1.83 K/UL — SIGNIFICANT CHANGE UP (ref 1–3.3)
LYMPHOCYTES # BLD AUTO: 23.2 % — SIGNIFICANT CHANGE UP (ref 13–44)
MCHC RBC-ENTMCNC: 30.1 PG — SIGNIFICANT CHANGE UP (ref 27–34)
MCHC RBC-ENTMCNC: 34.1 % — SIGNIFICANT CHANGE UP (ref 32–36)
MCV RBC AUTO: 88.1 FL — SIGNIFICANT CHANGE UP (ref 80–100)
MONOCYTES # BLD AUTO: 0.66 K/UL — SIGNIFICANT CHANGE UP (ref 0–0.9)
MONOCYTES NFR BLD AUTO: 8.4 % — SIGNIFICANT CHANGE UP (ref 2–14)
NEUTROPHILS # BLD AUTO: 5.18 K/UL — SIGNIFICANT CHANGE UP (ref 1.8–7.4)
NEUTROPHILS NFR BLD AUTO: 65.7 % — SIGNIFICANT CHANGE UP (ref 43–77)
NRBC # FLD: 0 — SIGNIFICANT CHANGE UP
PLATELET # BLD AUTO: 263 K/UL — SIGNIFICANT CHANGE UP (ref 150–400)
PMV BLD: 10.1 FL — SIGNIFICANT CHANGE UP (ref 7–13)
POTASSIUM SERPL-MCNC: 4.7 MMOL/L — SIGNIFICANT CHANGE UP (ref 3.5–5.3)
POTASSIUM SERPL-SCNC: 4.7 MMOL/L — SIGNIFICANT CHANGE UP (ref 3.5–5.3)
PROT SERPL-MCNC: 7.7 G/DL — SIGNIFICANT CHANGE UP (ref 6–8.3)
RBC # BLD: 4.69 M/UL — SIGNIFICANT CHANGE UP (ref 4.2–5.8)
RBC # FLD: 12.7 % — SIGNIFICANT CHANGE UP (ref 10.3–14.5)
SODIUM SERPL-SCNC: 137 MMOL/L — SIGNIFICANT CHANGE UP (ref 135–145)
WBC # BLD: 7.88 K/UL — SIGNIFICANT CHANGE UP (ref 3.8–10.5)
WBC # FLD AUTO: 7.88 K/UL — SIGNIFICANT CHANGE UP (ref 3.8–10.5)

## 2017-08-27 RX ORDER — MEROPENEM 1 G/30ML
0 INJECTION INTRAVENOUS
Qty: 0 | Refills: 0 | COMMUNITY
Start: 2017-08-27

## 2017-08-27 RX ORDER — VANCOMYCIN HCL 1 G
0 VIAL (EA) INTRAVENOUS
Qty: 0 | Refills: 0 | COMMUNITY
Start: 2017-08-27

## 2017-08-27 RX ORDER — ALBUTEROL 90 UG/1
2.5 AEROSOL, METERED ORAL
Qty: 0 | Refills: 0 | Status: DISCONTINUED | OUTPATIENT
Start: 2017-08-27 | End: 2017-08-31

## 2017-08-27 RX ORDER — ALBUTEROL 90 UG/1
2.5 AEROSOL, METERED ORAL ONCE
Qty: 0 | Refills: 0 | Status: DISCONTINUED | OUTPATIENT
Start: 2017-08-27 | End: 2017-08-31

## 2017-08-27 RX ORDER — BUDESONIDE AND FORMOTEROL FUMARATE DIHYDRATE 160; 4.5 UG/1; UG/1
2 AEROSOL RESPIRATORY (INHALATION)
Qty: 0 | Refills: 0 | Status: DISCONTINUED | OUTPATIENT
Start: 2017-08-27 | End: 2017-08-31

## 2017-08-27 RX ORDER — DIPHENHYDRAMINE HCL 50 MG
25 CAPSULE ORAL EVERY 12 HOURS
Qty: 0 | Refills: 0 | Status: DISCONTINUED | OUTPATIENT
Start: 2017-08-27 | End: 2017-08-31

## 2017-08-27 RX ORDER — DIPHENHYDRAMINE HCL 50 MG
25 CAPSULE ORAL ONCE
Qty: 0 | Refills: 0 | Status: COMPLETED | OUTPATIENT
Start: 2017-08-27 | End: 2017-08-27

## 2017-08-27 RX ADMIN — SODIUM CHLORIDE 4 MILLILITER(S): 9 INJECTION INTRAMUSCULAR; INTRAVENOUS; SUBCUTANEOUS at 11:35

## 2017-08-27 RX ADMIN — BUDESONIDE AND FORMOTEROL FUMARATE DIHYDRATE 2 PUFF(S): 160; 4.5 AEROSOL RESPIRATORY (INHALATION) at 23:08

## 2017-08-27 RX ADMIN — Medication 50 MILLIGRAM(S): at 23:09

## 2017-08-27 RX ADMIN — Medication 6 CAPSULE(S): at 12:11

## 2017-08-27 RX ADMIN — MEROPENEM 200 MILLIGRAM(S): 1 INJECTION INTRAVENOUS at 02:13

## 2017-08-27 RX ADMIN — ALBUTEROL 2.5 MILLIGRAM(S): 90 AEROSOL, METERED ORAL at 21:28

## 2017-08-27 RX ADMIN — Medication 50 MILLIGRAM(S): at 13:30

## 2017-08-27 RX ADMIN — ALBUTEROL 2.5 MILLIGRAM(S): 90 AEROSOL, METERED ORAL at 11:36

## 2017-08-27 RX ADMIN — MEROPENEM 200 MILLIGRAM(S): 1 INJECTION INTRAVENOUS at 08:29

## 2017-08-27 RX ADMIN — Medication 50 MILLIGRAM(S): at 07:03

## 2017-08-27 RX ADMIN — Medication 300 MILLIGRAM(S): at 11:25

## 2017-08-27 RX ADMIN — Medication 6 CAPSULE(S): at 17:06

## 2017-08-27 RX ADMIN — BUDESONIDE AND FORMOTEROL FUMARATE DIHYDRATE 2 PUFF(S): 160; 4.5 AEROSOL RESPIRATORY (INHALATION) at 11:46

## 2017-08-27 RX ADMIN — Medication 6 CAPSULE(S): at 08:29

## 2017-08-27 RX ADMIN — MONTELUKAST 10 MILLIGRAM(S): 4 TABLET, CHEWABLE ORAL at 00:00

## 2017-08-27 RX ADMIN — DORNASE ALFA 1 MILLIGRAM(S): 1 SOLUTION RESPIRATORY (INHALATION) at 21:30

## 2017-08-27 RX ADMIN — Medication 1: at 12:11

## 2017-08-27 RX ADMIN — Medication 2 UNIT(S): at 08:28

## 2017-08-27 RX ADMIN — Medication 25 MILLIGRAM(S): at 03:37

## 2017-08-27 RX ADMIN — Medication 250 MILLIGRAM(S): at 04:30

## 2017-08-27 RX ADMIN — DORNASE ALFA 1 MILLIGRAM(S): 1 SOLUTION RESPIRATORY (INHALATION) at 11:35

## 2017-08-27 RX ADMIN — MONTELUKAST 10 MILLIGRAM(S): 4 TABLET, CHEWABLE ORAL at 12:11

## 2017-08-27 RX ADMIN — Medication 250 MILLIGRAM(S): at 14:36

## 2017-08-27 RX ADMIN — MEROPENEM 200 MILLIGRAM(S): 1 INJECTION INTRAVENOUS at 17:06

## 2017-08-27 RX ADMIN — FAMOTIDINE 20 MILLIGRAM(S): 10 INJECTION INTRAVENOUS at 12:11

## 2017-08-27 RX ADMIN — Medication 25 MILLIGRAM(S): at 13:30

## 2017-08-27 RX ADMIN — Medication 2: at 17:06

## 2017-08-27 RX ADMIN — SODIUM CHLORIDE 4 MILLILITER(S): 9 INJECTION INTRAMUSCULAR; INTRAVENOUS; SUBCUTANEOUS at 21:29

## 2017-08-27 NOTE — DISCHARGE NOTE ADULT - CARE PROVIDER_API CALL
Dilcia Wesley), Critical Care Medicine; Internal Medicine; Pulmonary Disease; Sleep Medicine  [address] [place] [address]  Phone: [phone]  Fax: [phone]

## 2017-08-27 NOTE — DISCHARGE NOTE ADULT - CONDITION (STATED IN TERMS THAT PERMIT A SPECIFIC MEASURABLE COMPARISON WITH CONDITION ON ADMISSION):
The pt was admitted for CF exacerbation and was treated with IV antibiotics as per Dr. Wesley.  The pt was stable for discharge home with outpatient primary care and pulmonary follow up.

## 2017-08-27 NOTE — DISCHARGE NOTE ADULT - HOSPITAL COURSE
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 At baseline, patient has productive cough of yellow/light green sputum from CF. Sputum has now progressed to dark green but with no blood. Patient reports the cough with more frequency now and causes him to occasionally vomit up sputum at night from excessive cough. Patient reports that he usually have pulmonary infection 2x each year. He reports his recent outpt PFT was FEV1/FVC=54% on 8/25 which was suboptimal compared to his baseline. Patient reported worsening cough to Dr. Wesley on 8/25/17 during the scheduled research visit (vertex 659 trial) and endorses that he is also planning to travel out of country for vacation on 9/9. Dr. Wesley advised the patient to be admitted to the hospital to start IV antibiotics so he could be optimized prior to his trip away and so he is eligible for enrollment in clinical trial.     Hospital Course:   The pt was admitted for worsening CF exacerbation as per Dr. Wesley from Critical Care/Pulmonary Medicine.  The pt began receiving IV Vancomycin, Meropenem, and Aztreonam as per Dr. Wesley.  A PICC line was placed so that the pt could receive antibiotics at home.  The pt was deemed stable for discharge with outpatient pulmonary and primary care follow up. 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 At baseline, patient has productive cough of yellow/light green sputum from CF. Sputum has now progressed to dark green but with no blood. Patient reports the cough with more frequency now and causes him to occasionally vomit up sputum at night from excessive cough. Patient reports that he usually have pulmonary infection 2x each year. He reports his recent outpt PFT was FEV1/FVC=54% on 8/25 which was suboptimal compared to his baseline. Patient reported worsening cough to Dr. Wesley on 8/25/17 during the scheduled research visit (vertex 659 trial) and endorses that he is also planning to travel out of country for vacation on 9/9. Dr. Wesley advised the patient to be admitted to the hospital to start IV antibiotics so he could be optimized prior to his trip away and so he is eligible for enrollment in clinical trial.     Hospital Course:   The pt was admitted for worsening CF exacerbation as per Dr. Wesley from Critical Care/Pulmonary Medicine.  The pt began receiving IV Vancomycin, Meropenem, and Aztreonam as per Dr. Wesley.  A PICC line was placed so that the pt could receive Meropenem, Aztreonam, and Vancomycin at home.  The pt was deemed stable for discharge with outpatient pulmonary and primary care follow up.

## 2017-08-27 NOTE — DISCHARGE NOTE ADULT - PATIENT PORTAL LINK FT
“You can access the FollowHealth Patient Portal, offered by [facility], by registering with the following website: [email or web address]”

## 2017-08-27 NOTE — PROGRESS NOTE ADULT - SUBJECTIVE AND OBJECTIVE BOX
Subjective:    Patient is a 27y old  Male who presents with a chief complaint of worsening cough (26 Aug 2017 22:27) - no overnight events since coming into the hospital, reports a decrease in cough and sputum production and is tolerating antibiotics well; PICC line pending.      MEDICATIONS  (STANDING):  meropenem IVPB   IV Intermittent   aztreonam  IVPB   IV Intermittent   vancomycin  IVPB   IV Intermittent   aztreonam  IVPB 2000 milliGRAM(s) IV Intermittent every 8 hours  vancomycin  IVPB 1000 milliGRAM(s) IV Intermittent every 12 hours  meropenem IVPB 2000 milliGRAM(s) IV Intermittent every 8 hours  amylase/lipase/protease  (CREON 24,000 Units) 6 Capsule(s) Oral three times a day with meals  montelukast 10 milliGRAM(s) Oral daily  famotidine    Tablet 20 milliGRAM(s) Oral daily  dornase melody Solution 1 milliGRAM(s) Inhalation two times a day  tobramycin for Nebulization 300 milliGRAM(s) Inhalation two times a day  sodium chloride 3%  Inhalation 4 milliLiter(s) Inhalation two times a day  insulin lispro Injectable (HumaLOG) 2 Unit(s) SubCutaneous before breakfast  insulin lispro (HumaLOG) corrective regimen sliding scale   SubCutaneous three times a day before meals  insulin lispro (HumaLOG) corrective regimen sliding scale   SubCutaneous at bedtime  dextrose 5%. 1000 milliLiter(s) (50 mL/Hr) IV Continuous <Continuous>  dextrose 50% Injectable 12.5 Gram(s) IV Push once  dextrose 50% Injectable 25 Gram(s) IV Push once  dextrose 50% Injectable 25 Gram(s) IV Push once  ALBUTerol    0.083% 2.5 milliGRAM(s) Nebulizer two times a day  buDESOnide 160 MICROgram(s)/formoterol 4.5 MICROgram(s) Inhaler 2 Puff(s) Inhalation two times a day  diphenhydrAMINE   Capsule 25 milliGRAM(s) Oral every 12 hours    MEDICATIONS  (PRN):  dextrose Gel 1 Dose(s) Oral once PRN Blood Glucose LESS THAN 70 milliGRAM(s)/deciliter  glucagon  Injectable 1 milliGRAM(s) IntraMuscular once PRN Glucose LESS THAN 70 milligrams/deciliter  ALBUTerol    90 MICROgram(s) HFA Inhaler 2 Puff(s) Inhalation every 6 hours PRN Shortness of Breath and/or Wheezing    Objective:    Vitals: Vital Signs Last 24 Hrs  T(C): 36.7 (08-27-17 @ 07:00), Max: 36.7 (08-26-17 @ 20:14)  T(F): 98.1 (08-27-17 @ 07:00), Max: 98.1 (08-27-17 @ 07:00)  HR: 70 (08-27-17 @ 07:00) (60 - 85)  BP: 102/51 (08-27-17 @ 07:00) (102/51 - 118/62)  BP(mean): --  RR: 18 (08-27-17 @ 07:00) (17 - 18)  SpO2: 97% (08-27-17 @ 07:00) (97% - 99%)            I&O's Summary      PHYSICAL EXAM:  GENERAL: NAD  HEAD:  Atraumatic, Normocephalic  EYES: EOMI, PERRLA, conjunctiva and sclera clear  ENMT: No tonsillar erythema, exudates, or enlargement; Moist mucous membranes, Good dentition, No lesions, no signs of increased sputum production on exam   NECK: Supple, No JVD, Normal thyroid  CHEST/LUNG: left-sided rhonci  HEART: Regular rate and rhythm; No murmurs, rubs, or gallops  ABDOMEN: Soft, Nontender, Nondistended; Bowel sounds present  EXTREMITIES:  2+ Peripheral Pulses, No clubbing, cyanosis, or edema  LYMPH: No lymphadenopathy noted  SKIN: No rashes or lesions                                               LABS:  08-27    137  |  99  |  14  ----------------------------<  144<H>  4.7   |  24  |  0.86    Ca    9.0      27 Aug 2017 06:45    TPro  7.7  /  Alb  3.5  /  TBili  0.3  /  DBili  x   /  AST  23  /  ALT  40  /  AlkPhos  113  08-27                        14.1   7.88  )-----------( 263      ( 27 Aug 2017 06:45 )             41.3     CAPILLARY BLOOD GLUCOSE  134 (27 Aug 2017 08:23)  183 (27 Aug 2017 00:00)

## 2017-08-27 NOTE — DISCHARGE NOTE ADULT - PLAN OF CARE
Maintain You were admitted with a suspected CF exacerbation.  You were treated with IV antibiotics, supportive care, and your home regimen.  A PICC line was placed so that you could go home on IV antibiotics.  Please follow up with your pulmonary and primary care physicians.

## 2017-08-27 NOTE — CONSULT NOTE ADULT - ASSESSMENT
Mr. Lucero is a 27 year old male with a significant past medical history of CF who comes in as a direct admit due to a CF exacerbation. His last cultures grew out MRSA and Burkholdderia multivorans and is currently on antibiotcs. He is to receive all of his antibiotics and a PICC line prior to discharge. Continue home medications.    - Cont. Vancomycin  - Cont. Aztreonam  - Cont. Meropenem  - End date of antibiotics is Sept 9th    - Cont Chest PT with albuterol, hypersaline, and Pulmozyme   - Cont inhaled medication  - Cont. Pancreatic enzymes prior to meals and snacks.    ~Edson Cason DO MPH  Pulmonary Consult Service  [phone] (Weekends)  [phone] (Weekdays)

## 2017-08-27 NOTE — DISCHARGE NOTE ADULT - HOME CARE AGENCY
[facility] [phone]  for  IVABX [facility] [phone]  for  IVABX,  RN to visit today for  2pm dose

## 2017-08-27 NOTE — PROGRESS NOTE ADULT - PROBLEM SELECTOR PLAN 1
Likely the reason for increased cough and sputum production  -IV meropenem, aztreonam and vancomycin   -PICC line placement pending - d/c with PICC for IV antibiotics through 9/8  -f/u sputum culture  -c/w home CF medications

## 2017-08-27 NOTE — DISCHARGE NOTE ADULT - MEDICATION SUMMARY - MEDICATIONS TO TAKE
I will START or STAY ON the medications listed below when I get home from the hospital:    Meropenem 2000 mg IVPB every 8 hours for 12 days   -- via PICC.  -- Indication: For Cystic fibrosis exacerbation    tobramycin  -- 300 milligram(s) inhaled 2 times a day  -- Indication: For Cystic fibrosis exacerbation    Lantus 100 units/mL subcutaneous solution  -- 8 unit(s) subcutaneous once a day (at bedtime)  -- Indication: For Cystic fibrosis exacerbation    Advair Diskus 500 mcg-50 mcg inhalation powder  -- 1 puff(s) inhaled 2 times a day  -- Indication: For Cystic fibrosis exacerbation    meropenem  --     -- Indication: For Cystic fibrosis exacerbation    pancrelipase 24,000 units-76,000 units-120,000 units oral delayed release capsule  -- 6 cap(s) by mouth 3 times a day (with meals)  -- Indication: For Cystic fibrosis exacerbation    vancomycin  --     -- Indication: For Cystic fibrosis exacerbation    ranitidine 150 mg oral capsule  -- 1 tab(s) by mouth once a day (at bedtime)  -- Indication: For Cystic fibrosis exacerbation    montelukast 10 mg oral tablet  -- 1 tab(s) by mouth once a day (in the evening)  -- Indication: For Cystic fibrosis exacerbation    Sodium Chloride, Inhalation 3% inhalation solution  -- 1 inhaler(s) inhaled 2 times a day  -- Indication: For Cystic fibrosis exacerbation    Cayston 75 mg inhalation powder for reconstitution  -- 75 milligram(s) inhaled 3 times a day  -- Indication: For Cystic fibrosis exacerbation    Pulmozyme  -- 1 milligram(s) inhaled 2 times a day  -- Indication: For Cystic fibrosis exacerbation I will START or STAY ON the medications listed below when I get home from the hospital:    Meropenem 2000 mg IVPB every 8 hours for 12 days   -- via PICC.  -- Indication: For Cystic fibrosis exacerbation    tobramycin  -- 300 milligram(s) inhaled 2 times a day  -- Indication: For Cystic fibrosis exacerbation    Lantus 100 units/mL subcutaneous solution  -- 8 unit(s) subcutaneous once a day (at bedtime)  -- Indication: For Cystic fibrosis exacerbation    Advair Diskus 500 mcg-50 mcg inhalation powder  -- 1 puff(s) inhaled 2 times a day  -- Indication: For Cystic fibrosis exacerbation    meropenem  --     -- Indication: For Cystic fibrosis exacerbation    pancrelipase 24,000 units-76,000 units-120,000 units oral delayed release capsule  -- 6 cap(s) by mouth 3 times a day (with meals)  -- Indication: For Cystic fibrosis exacerbation    vancomycin  --   Please take 1500 mg IV every 12 hours through 9/8/2017  -- Indication: For Cystic fibrosis exacerbation    ranitidine 150 mg oral capsule  -- 1 tab(s) by mouth once a day (at bedtime)  -- Indication: For Cystic fibrosis exacerbation    montelukast 10 mg oral tablet  -- 1 tab(s) by mouth once a day (in the evening)  -- Indication: For Cystic fibrosis exacerbation    Sodium Chloride, Inhalation 3% inhalation solution  -- 1 inhaler(s) inhaled 2 times a day  -- Indication: For Cystic fibrosis exacerbation    Cayston 75 mg inhalation powder for reconstitution  -- 75 milligram(s) inhaled 3 times a day  -- Indication: For Cystic fibrosis exacerbation    Pulmozyme  -- 1 milligram(s) inhaled 2 times a day  -- Indication: For Cystic fibrosis exacerbation

## 2017-08-27 NOTE — DISCHARGE NOTE ADULT - CARE PLAN
Principal Discharge DX:	Cystic fibrosis exacerbation  Goal:	Maintain  Instructions for follow-up, activity and diet:	You were admitted with a suspected CF exacerbation.  You were treated with IV antibiotics, supportive care, and your home regimen.  A PICC line was placed so that you could go home on IV antibiotics.  Please follow up with your pulmonary and primary care physicians.

## 2017-08-27 NOTE — CONSULT NOTE ADULT - SUBJECTIVE AND OBJECTIVE BOX
HPI: Mr. 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 At baseline, patient has productive cough of yellow/light green sputum from CF. Sputum has now progressed to dark green but with no blood. Patient reports the cough with more frequency now and causes him to occasionally vomit up sputum at night from excessive cough. Patient reports that he usually have pulmonary infection 2x each year. He reports his recent outpt PFT was FEV1/FVC=54% on 8/25 which was suboptimal compared to his baseline. Patient reported worsening cough to Dr. Wesley on 8/25/17 during the scheduled research visit (vertex [id number] trial) and endorses that he is also planning to travel out of country for vacation on 9/9. Dr. Wesley advised the patient to be admitted to the hospital to start IV antibiotics so he could be optimized prior to his trip away and so he is eligible for enrollment in clinical trial.     PAST MEDICAL & SURGICAL HISTORY:  Cystic fibrosis with pulmonary manifestations  Bronchiectasis  No significant past surgical history      FAMILY HISTORY:  No pertinent family history in first degree relatives      SOCIAL HISTORY:  Smoking: [x ] Never Smoked [ ] Former Smoker (__ packs x ___ years) [ ] Current Smoker  (__ packs x ___ years)  Substance Use: [x ] Never Used [ ] Used ____  EtOH Use: None  Marital Status: [ x] Single [ ]  [ ]  [ ]   Sexual History: Noncontributory  Occupation: Student  Recent Travel: None  Country of Birth: [place]  Advance Directives: Full    Allergies    No Known Allergies    Intolerances    Benadryl (Flushing (Skin))      HOME MEDICATIONS:    REVIEW OF SYSTEMS:  Constitutional: [x ] fevers [ ] chills [ ] weight loss [ ] weight gain  HEENT: [ ] dry eyes [ ] eye irritation [ ] postnasal drip [ ] nasal congestion  CV: [x ] chest pain [ ] orthopnea [ ] palpitations [ ] murmur  Resp: [x ] cough [ ] shortness of breath [ x] dyspnea [ ] wheezing [x ] sputum [ ] hemoptysis  GI: [ ] nausea [ ] vomiting [ ] diarrhea [ ] constipation [ ] abd pain [ ] dysphagia   : [ ] dysuria [ ] nocturia [ ] hematuria [ ] increased urinary frequency  Musculoskeletal: [ ] back pain [ ] myalgias [ ] arthralgias [ ] fracture  Skin: [ ] rash [ ] itch  Neurological: [ ] headache [ ] dizziness [ ] syncope [ ] weakness [ ] numbness  Psychiatric: [ ] anxiety [ ] depression  Endocrine: [ ] diabetes [ ] thyroid problem  Hematologic/Lymphatic: [ ] anemia [ ] bleeding problem  Allergic/Immunologic: [ ] itchy eyes [ ] nasal discharge [ ] hives [ ] angioedema  [x ] All other systems negative  [ ] Unable to assess ROS because ________    OBJECTIVE:  ICU Vital Signs Last 24 Hrs  T(C): 37 (27 Aug 2017 12:02), Max: 37 (27 Aug 2017 12:02)  T(F): 98.6 (27 Aug 2017 12:02), Max: 98.6 (27 Aug 2017 12:02)  HR: 55 (27 Aug 2017 12:02) (55 - 85)  BP: 106/58 (27 Aug 2017 12:02) (102/51 - 118/62)  BP(mean): --  ABP: --  ABP(mean): --  RR: 18 (27 Aug 2017 12:02) (17 - 18)  SpO2: 97% (27 Aug 2017 12:02) (97% - 99%)    CAPILLARY BLOOD GLUCOSE  174 (27 Aug 2017 12:02)      PHYSICAL EXAM:  General:  Under no acute distress  HEENT:  PERRLA  Lymph Nodes: No lymphadenopathy  Neck: Normal  Respiratory: CTA B/L  Cardiovascular: RRR, Normal S1 and S2  Abdomen: SNT, +BS, No R/G/R  Extremities: +Clubbing. No cyanosis/edema  Skin: No Rash  Neurological: Nonfocal  Psychiatry: Normal affect    HOSPITAL MEDICATIONS:  MEDICATIONS  (STANDING):  meropenem IVPB   IV Intermittent   aztreonam  IVPB   IV Intermittent   vancomycin  IVPB   IV Intermittent   aztreonam  IVPB 2000 milliGRAM(s) IV Intermittent every 8 hours  vancomycin  IVPB 1000 milliGRAM(s) IV Intermittent every 12 hours  meropenem IVPB 2000 milliGRAM(s) IV Intermittent every 8 hours  amylase/lipase/protease  (CREON 24,000 Units) 6 Capsule(s) Oral three times a day with meals  montelukast 10 milliGRAM(s) Oral daily  famotidine    Tablet 20 milliGRAM(s) Oral daily  dornase melody Solution 1 milliGRAM(s) Inhalation two times a day  tobramycin for Nebulization 300 milliGRAM(s) Inhalation two times a day  sodium chloride 3%  Inhalation 4 milliLiter(s) Inhalation two times a day  insulin lispro Injectable (HumaLOG) 2 Unit(s) SubCutaneous before breakfast  insulin lispro (HumaLOG) corrective regimen sliding scale   SubCutaneous three times a day before meals  insulin lispro (HumaLOG) corrective regimen sliding scale   SubCutaneous at bedtime  dextrose 5%. 1000 milliLiter(s) (50 mL/Hr) IV Continuous <Continuous>  dextrose 50% Injectable 12.5 Gram(s) IV Push once  dextrose 50% Injectable 25 Gram(s) IV Push once  dextrose 50% Injectable 25 Gram(s) IV Push once  ALBUTerol    0.083% 2.5 milliGRAM(s) Nebulizer two times a day  buDESOnide 160 MICROgram(s)/formoterol 4.5 MICROgram(s) Inhaler 2 Puff(s) Inhalation two times a day  diphenhydrAMINE   Capsule 25 milliGRAM(s) Oral every 12 hours  ALBUTerol    0.083%. 2.5 milliGRAM(s) Nebulizer once    MEDICATIONS  (PRN):  dextrose Gel 1 Dose(s) Oral once PRN Blood Glucose LESS THAN 70 milliGRAM(s)/deciliter  glucagon  Injectable 1 milliGRAM(s) IntraMuscular once PRN Glucose LESS THAN 70 milligrams/deciliter  ALBUTerol    90 MICROgram(s) HFA Inhaler 2 Puff(s) Inhalation every 6 hours PRN Shortness of Breath and/or Wheezing      LABS:                        14.1   7.88  )-----------( 263      ( 27 Aug 2017 06:45 )             41.3     Hgb Trend: 14.1<--  08-27    137  |  99  |  14  ----------------------------<  144<H>  4.7   |  24  |  0.86    Ca    9.0      27 Aug 2017 06:45    TPro  7.7  /  Alb  3.5  /  TBili  0.3  /  DBili  x   /  AST  23  /  ALT  40  /  AlkPhos  113  08-27    Creatinine Trend: 0.86<--      MICROBIOLOGY: MSSA, MRSA, Burholderia Cepacia, Pseudomonas and 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

## 2017-08-27 NOTE — DISCHARGE NOTE ADULT - ADDITIONAL INSTRUCTIONS
Please follow up with your pulmonary and primary care doctors.  Please take your medications as prescribed. Please follow up with your pulmonary and primary care doctors.  Please take your medications as prescribed.  Please follow up with your endocrinologist this Friday 9/1 @ 2:45 PM.    Please take 8 U Lantus at bedtime.

## 2017-08-27 NOTE — PROGRESS NOTE ADULT - ASSESSMENT
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

## 2017-08-28 LAB
ALBUMIN SERPL ELPH-MCNC: 3.6 G/DL — SIGNIFICANT CHANGE UP (ref 3.3–5)
ALP SERPL-CCNC: 134 U/L — HIGH (ref 40–120)
ALT FLD-CCNC: 44 U/L — HIGH (ref 4–41)
AST SERPL-CCNC: 27 U/L — SIGNIFICANT CHANGE UP (ref 4–40)
BASOPHILS # BLD AUTO: 0.02 K/UL — SIGNIFICANT CHANGE UP (ref 0–0.2)
BASOPHILS NFR BLD AUTO: 0.3 % — SIGNIFICANT CHANGE UP (ref 0–2)
BILIRUB SERPL-MCNC: 0.4 MG/DL — SIGNIFICANT CHANGE UP (ref 0.2–1.2)
BUN SERPL-MCNC: 11 MG/DL — SIGNIFICANT CHANGE UP (ref 7–23)
CALCIUM SERPL-MCNC: 9.1 MG/DL — SIGNIFICANT CHANGE UP (ref 8.4–10.5)
CHLORIDE SERPL-SCNC: 98 MMOL/L — SIGNIFICANT CHANGE UP (ref 98–107)
CO2 SERPL-SCNC: 25 MMOL/L — SIGNIFICANT CHANGE UP (ref 22–31)
CREAT SERPL-MCNC: 0.88 MG/DL — SIGNIFICANT CHANGE UP (ref 0.5–1.3)
EOSINOPHIL # BLD AUTO: 0.27 K/UL — SIGNIFICANT CHANGE UP (ref 0–0.5)
EOSINOPHIL NFR BLD AUTO: 4.6 % — SIGNIFICANT CHANGE UP (ref 0–6)
GLUCOSE SERPL-MCNC: 132 MG/DL — HIGH (ref 70–99)
GRAM STN SPT: SIGNIFICANT CHANGE UP
HCT VFR BLD CALC: 43.1 % — SIGNIFICANT CHANGE UP (ref 39–50)
HGB BLD-MCNC: 14.6 G/DL — SIGNIFICANT CHANGE UP (ref 13–17)
IMM GRANULOCYTES # BLD AUTO: 0.02 # — SIGNIFICANT CHANGE UP
IMM GRANULOCYTES NFR BLD AUTO: 0.3 % — SIGNIFICANT CHANGE UP (ref 0–1.5)
LYMPHOCYTES # BLD AUTO: 1.01 K/UL — SIGNIFICANT CHANGE UP (ref 1–3.3)
LYMPHOCYTES # BLD AUTO: 17.2 % — SIGNIFICANT CHANGE UP (ref 13–44)
MAGNESIUM SERPL-MCNC: 2 MG/DL — SIGNIFICANT CHANGE UP (ref 1.6–2.6)
MCHC RBC-ENTMCNC: 29 PG — SIGNIFICANT CHANGE UP (ref 27–34)
MCHC RBC-ENTMCNC: 33.9 % — SIGNIFICANT CHANGE UP (ref 32–36)
MCV RBC AUTO: 85.7 FL — SIGNIFICANT CHANGE UP (ref 80–100)
MONOCYTES # BLD AUTO: 0.59 K/UL — SIGNIFICANT CHANGE UP (ref 0–0.9)
MONOCYTES NFR BLD AUTO: 10.1 % — SIGNIFICANT CHANGE UP (ref 2–14)
NEUTROPHILS # BLD AUTO: 3.96 K/UL — SIGNIFICANT CHANGE UP (ref 1.8–7.4)
NEUTROPHILS NFR BLD AUTO: 67.5 % — SIGNIFICANT CHANGE UP (ref 43–77)
NRBC # FLD: 0 — SIGNIFICANT CHANGE UP
PHOSPHATE SERPL-MCNC: 3.6 MG/DL — SIGNIFICANT CHANGE UP (ref 2.5–4.5)
PLATELET # BLD AUTO: 285 K/UL — SIGNIFICANT CHANGE UP (ref 150–400)
PMV BLD: 9.8 FL — SIGNIFICANT CHANGE UP (ref 7–13)
POTASSIUM SERPL-MCNC: 4.4 MMOL/L — SIGNIFICANT CHANGE UP (ref 3.5–5.3)
POTASSIUM SERPL-SCNC: 4.4 MMOL/L — SIGNIFICANT CHANGE UP (ref 3.5–5.3)
PROT SERPL-MCNC: 7.9 G/DL — SIGNIFICANT CHANGE UP (ref 6–8.3)
RBC # BLD: 5.03 M/UL — SIGNIFICANT CHANGE UP (ref 4.2–5.8)
RBC # FLD: 12.5 % — SIGNIFICANT CHANGE UP (ref 10.3–14.5)
SODIUM SERPL-SCNC: 135 MMOL/L — SIGNIFICANT CHANGE UP (ref 135–145)
SPECIMEN SOURCE: SIGNIFICANT CHANGE UP
VANCOMYCIN TROUGH SERPL-MCNC: 5.5 UG/ML — LOW (ref 10–20)
WBC # BLD: 5.87 K/UL — SIGNIFICANT CHANGE UP (ref 3.8–10.5)
WBC # FLD AUTO: 5.87 K/UL — SIGNIFICANT CHANGE UP (ref 3.8–10.5)

## 2017-08-28 PROCEDURE — 36569 INSJ PICC 5 YR+ W/O IMAGING: CPT | Mod: LT,59

## 2017-08-28 PROCEDURE — 71010: CPT | Mod: 26

## 2017-08-28 PROCEDURE — 77001 FLUOROGUIDE FOR VEIN DEVICE: CPT | Mod: 26,GC

## 2017-08-28 PROCEDURE — 76937 US GUIDE VASCULAR ACCESS: CPT | Mod: 26

## 2017-08-28 PROCEDURE — 99233 SBSQ HOSP IP/OBS HIGH 50: CPT | Mod: GC

## 2017-08-28 RX ORDER — TOBRAMYCIN SULFATE 40 MG/ML
300 VIAL (ML) INJECTION
Qty: 0 | Refills: 0 | COMMUNITY

## 2017-08-28 RX ORDER — VANCOMYCIN HCL 1 G
1500 VIAL (EA) INTRAVENOUS EVERY 12 HOURS
Qty: 0 | Refills: 0 | Status: DISCONTINUED | OUTPATIENT
Start: 2017-08-28 | End: 2017-08-31

## 2017-08-28 RX ORDER — IBUPROFEN 200 MG
400 TABLET ORAL EVERY 6 HOURS
Qty: 0 | Refills: 0 | Status: DISCONTINUED | OUTPATIENT
Start: 2017-08-28 | End: 2017-08-31

## 2017-08-28 RX ADMIN — Medication 50 MILLIGRAM(S): at 07:30

## 2017-08-28 RX ADMIN — ALBUTEROL 2.5 MILLIGRAM(S): 90 AEROSOL, METERED ORAL at 10:35

## 2017-08-28 RX ADMIN — SODIUM CHLORIDE 4 MILLILITER(S): 9 INJECTION INTRAMUSCULAR; INTRAVENOUS; SUBCUTANEOUS at 21:29

## 2017-08-28 RX ADMIN — MEROPENEM 200 MILLIGRAM(S): 1 INJECTION INTRAVENOUS at 17:19

## 2017-08-28 RX ADMIN — Medication 1: at 12:23

## 2017-08-28 RX ADMIN — Medication 400 MILLIGRAM(S): at 12:07

## 2017-08-28 RX ADMIN — ALBUTEROL 2.5 MILLIGRAM(S): 90 AEROSOL, METERED ORAL at 21:27

## 2017-08-28 RX ADMIN — Medication 400 MILLIGRAM(S): at 11:15

## 2017-08-28 RX ADMIN — Medication 6 CAPSULE(S): at 08:34

## 2017-08-28 RX ADMIN — MEROPENEM 200 MILLIGRAM(S): 1 INJECTION INTRAVENOUS at 08:34

## 2017-08-28 RX ADMIN — Medication 6 CAPSULE(S): at 12:23

## 2017-08-28 RX ADMIN — Medication 250 MILLIGRAM(S): at 03:20

## 2017-08-28 RX ADMIN — Medication 400 MILLIGRAM(S): at 21:08

## 2017-08-28 RX ADMIN — FAMOTIDINE 20 MILLIGRAM(S): 10 INJECTION INTRAVENOUS at 11:15

## 2017-08-28 RX ADMIN — Medication 400 MILLIGRAM(S): at 21:58

## 2017-08-28 RX ADMIN — Medication 6 CAPSULE(S): at 17:20

## 2017-08-28 RX ADMIN — Medication 300 MILLIGRAM(S): at 17:19

## 2017-08-28 RX ADMIN — MEROPENEM 200 MILLIGRAM(S): 1 INJECTION INTRAVENOUS at 01:56

## 2017-08-28 RX ADMIN — MONTELUKAST 10 MILLIGRAM(S): 4 TABLET, CHEWABLE ORAL at 11:15

## 2017-08-28 RX ADMIN — Medication 50 MILLIGRAM(S): at 14:09

## 2017-08-28 RX ADMIN — Medication 2: at 22:16

## 2017-08-28 RX ADMIN — Medication 2 UNIT(S): at 08:33

## 2017-08-28 RX ADMIN — BUDESONIDE AND FORMOTEROL FUMARATE DIHYDRATE 2 PUFF(S): 160; 4.5 AEROSOL RESPIRATORY (INHALATION) at 11:08

## 2017-08-28 RX ADMIN — MEROPENEM 200 MILLIGRAM(S): 1 INJECTION INTRAVENOUS at 23:55

## 2017-08-28 RX ADMIN — SODIUM CHLORIDE 4 MILLILITER(S): 9 INJECTION INTRAMUSCULAR; INTRAVENOUS; SUBCUTANEOUS at 10:45

## 2017-08-28 RX ADMIN — Medication 50 MILLIGRAM(S): at 21:08

## 2017-08-28 RX ADMIN — DORNASE ALFA 1 MILLIGRAM(S): 1 SOLUTION RESPIRATORY (INHALATION) at 23:32

## 2017-08-28 RX ADMIN — Medication 25 MILLIGRAM(S): at 02:43

## 2017-08-28 RX ADMIN — BUDESONIDE AND FORMOTEROL FUMARATE DIHYDRATE 2 PUFF(S): 160; 4.5 AEROSOL RESPIRATORY (INHALATION) at 21:08

## 2017-08-28 RX ADMIN — Medication 3: at 17:19

## 2017-08-28 NOTE — PROGRESS NOTE ADULT - SUBJECTIVE AND OBJECTIVE BOX
Subjective:    Patient is a 27y old  Male who presents with a chief complaint of worsening cough (27 Aug 2017 14:24) - no overnight events, PICC line placement pending.      MEDICATIONS  (STANDING):  meropenem IVPB   IV Intermittent   aztreonam  IVPB   IV Intermittent   vancomycin  IVPB   IV Intermittent   aztreonam  IVPB 2000 milliGRAM(s) IV Intermittent every 8 hours  vancomycin  IVPB 1000 milliGRAM(s) IV Intermittent every 12 hours  meropenem IVPB 2000 milliGRAM(s) IV Intermittent every 8 hours  amylase/lipase/protease  (CREON 24,000 Units) 6 Capsule(s) Oral three times a day with meals  montelukast 10 milliGRAM(s) Oral daily  famotidine    Tablet 20 milliGRAM(s) Oral daily  dornase melody Solution 1 milliGRAM(s) Inhalation two times a day  tobramycin for Nebulization 300 milliGRAM(s) Inhalation two times a day  sodium chloride 3%  Inhalation 4 milliLiter(s) Inhalation two times a day  insulin lispro Injectable (HumaLOG) 2 Unit(s) SubCutaneous before breakfast  insulin lispro (HumaLOG) corrective regimen sliding scale   SubCutaneous three times a day before meals  insulin lispro (HumaLOG) corrective regimen sliding scale   SubCutaneous at bedtime  dextrose 5%. 1000 milliLiter(s) (50 mL/Hr) IV Continuous <Continuous>  dextrose 50% Injectable 12.5 Gram(s) IV Push once  dextrose 50% Injectable 25 Gram(s) IV Push once  dextrose 50% Injectable 25 Gram(s) IV Push once  ALBUTerol    0.083% 2.5 milliGRAM(s) Nebulizer two times a day  buDESOnide 160 MICROgram(s)/formoterol 4.5 MICROgram(s) Inhaler 2 Puff(s) Inhalation two times a day  diphenhydrAMINE   Capsule 25 milliGRAM(s) Oral every 12 hours  ALBUTerol    0.083%. 2.5 milliGRAM(s) Nebulizer once    MEDICATIONS  (PRN):  dextrose Gel 1 Dose(s) Oral once PRN Blood Glucose LESS THAN 70 milliGRAM(s)/deciliter  glucagon  Injectable 1 milliGRAM(s) IntraMuscular once PRN Glucose LESS THAN 70 milligrams/deciliter  ALBUTerol    90 MICROgram(s) HFA Inhaler 2 Puff(s) Inhalation every 6 hours PRN Shortness of Breath and/or Wheezing        Objective:    Vitals: Vital Signs Last 24 Hrs  T(C): 37.1 (08-28-17 @ 06:28), Max: 37.4 (08-27-17 @ 21:51)  T(F): 98.7 (08-28-17 @ 06:28), Max: 99.3 (08-27-17 @ 21:51)  HR: 58 (08-28-17 @ 06:28) (55 - 79)  BP: 106/52 (08-28-17 @ 06:28) (106/52 - 115/83)  BP(mean): --  RR: 17 (08-28-17 @ 06:28) (17 - 18)  SpO2: 98% (08-28-17 @ 06:28) (97% - 98%)            I&O's Summary      PHYSICAL EXAM:  GENERAL: NAD, well-groomed, well-developed  HEAD:  Atraumatic, Normocephalic  CHEST/LUNG: coarse rhonci   HEART: Regular rate and rhythm; No murmurs, rubs, or gallops  ABDOMEN: Soft, Nontender, Nondistended; Bowel sounds present  EXTREMITIES:  2+ Peripheral Pulses, No clubbing, cyanosis, or edema                                          LABS:  08-27    137  |  99  |  14  ----------------------------<  144<H>  4.7   |  24  |  0.86    Ca    9.0      27 Aug 2017 06:45    TPro  7.7  /  Alb  3.5  /  TBili  0.3  /  DBili  x   /  AST  23  /  ALT  40  /  AlkPhos  113  08-27               14.6   5.87  )-----------( 285      ( 28 Aug 2017 06:10 )             43.1                         14.1   7.88  )-----------( 263      ( 27 Aug 2017 06:45 )             41.3     CAPILLARY BLOOD GLUCOSE  97 (27 Aug 2017 22:41)  209 (27 Aug 2017 17:05)  174 (27 Aug 2017 12:02)  134 (27 Aug 2017 08:23) Subjective:    Patient is a 27y old  Male who presents with a chief complaint of worsening cough (27 Aug 2017 14:24) - no overnight events, PICC line placed; will check Vanc trough this night before 4th dose.        MEDICATIONS  (STANDING):  meropenem IVPB   IV Intermittent   aztreonam  IVPB   IV Intermittent   vancomycin  IVPB   IV Intermittent   aztreonam  IVPB 2000 milliGRAM(s) IV Intermittent every 8 hours  vancomycin  IVPB 1000 milliGRAM(s) IV Intermittent every 12 hours  meropenem IVPB 2000 milliGRAM(s) IV Intermittent every 8 hours  amylase/lipase/protease  (CREON 24,000 Units) 6 Capsule(s) Oral three times a day with meals  montelukast 10 milliGRAM(s) Oral daily  famotidine    Tablet 20 milliGRAM(s) Oral daily  dornase melody Solution 1 milliGRAM(s) Inhalation two times a day  tobramycin for Nebulization 300 milliGRAM(s) Inhalation two times a day  sodium chloride 3%  Inhalation 4 milliLiter(s) Inhalation two times a day  insulin lispro Injectable (HumaLOG) 2 Unit(s) SubCutaneous before breakfast  insulin lispro (HumaLOG) corrective regimen sliding scale   SubCutaneous three times a day before meals  insulin lispro (HumaLOG) corrective regimen sliding scale   SubCutaneous at bedtime  dextrose 5%. 1000 milliLiter(s) (50 mL/Hr) IV Continuous <Continuous>  dextrose 50% Injectable 12.5 Gram(s) IV Push once  dextrose 50% Injectable 25 Gram(s) IV Push once  dextrose 50% Injectable 25 Gram(s) IV Push once  ALBUTerol    0.083% 2.5 milliGRAM(s) Nebulizer two times a day  buDESOnide 160 MICROgram(s)/formoterol 4.5 MICROgram(s) Inhaler 2 Puff(s) Inhalation two times a day  diphenhydrAMINE   Capsule 25 milliGRAM(s) Oral every 12 hours  ALBUTerol    0.083%. 2.5 milliGRAM(s) Nebulizer once    MEDICATIONS  (PRN):  dextrose Gel 1 Dose(s) Oral once PRN Blood Glucose LESS THAN 70 milliGRAM(s)/deciliter  glucagon  Injectable 1 milliGRAM(s) IntraMuscular once PRN Glucose LESS THAN 70 milligrams/deciliter  ALBUTerol    90 MICROgram(s) HFA Inhaler 2 Puff(s) Inhalation every 6 hours PRN Shortness of Breath and/or Wheezing        Objective:    Vitals: Vital Signs Last 24 Hrs  T(C): 37.1 (08-28-17 @ 06:28), Max: 37.4 (08-27-17 @ 21:51)  T(F): 98.7 (08-28-17 @ 06:28), Max: 99.3 (08-27-17 @ 21:51)  HR: 58 (08-28-17 @ 06:28) (55 - 79)  BP: 106/52 (08-28-17 @ 06:28) (106/52 - 115/83)  BP(mean): --  RR: 17 (08-28-17 @ 06:28) (17 - 18)  SpO2: 98% (08-28-17 @ 06:28) (97% - 98%)            I&O's Summary      PHYSICAL EXAM:  GENERAL: NAD, well-groomed, well-developed  HEAD:  Atraumatic, Normocephalic  CHEST/LUNG: coarse rhonci   HEART: Regular rate and rhythm; No murmurs, rubs, or gallops  ABDOMEN: Soft, Nontender, Nondistended; Bowel sounds present  EXTREMITIES:  2+ Peripheral Pulses, No clubbing, cyanosis, or edema                                          LABS:  08-27    137  |  99  |  14  ----------------------------<  144<H>  4.7   |  24  |  0.86    Ca    9.0      27 Aug 2017 06:45    TPro  7.7  /  Alb  3.5  /  TBili  0.3  /  DBili  x   /  AST  23  /  ALT  40  /  AlkPhos  113  08-27               14.6   5.87  )-----------( 285      ( 28 Aug 2017 06:10 )             43.1                         14.1   7.88  )-----------( 263      ( 27 Aug 2017 06:45 )             41.3     CAPILLARY BLOOD GLUCOSE  97 (27 Aug 2017 22:41)  209 (27 Aug 2017 17:05)  174 (27 Aug 2017 12:02)  134 (27 Aug 2017 08:23) Subjective: Ongoing cough overnight, no hemoptysis    Patient is a 27y old  Male who presents with a chief complaint of worsening cough (27 Aug 2017 14:24) - no overnight events, PICC line placed; will check Vanc trough this night before 4th dose.        MEDICATIONS  (STANDING):  meropenem IVPB   IV Intermittent   aztreonam  IVPB   IV Intermittent   vancomycin  IVPB   IV Intermittent   aztreonam  IVPB 2000 milliGRAM(s) IV Intermittent every 8 hours  vancomycin  IVPB 1000 milliGRAM(s) IV Intermittent every 12 hours  meropenem IVPB 2000 milliGRAM(s) IV Intermittent every 8 hours  amylase/lipase/protease  (CREON 24,000 Units) 6 Capsule(s) Oral three times a day with meals  montelukast 10 milliGRAM(s) Oral daily  famotidine    Tablet 20 milliGRAM(s) Oral daily  dornase melody Solution 1 milliGRAM(s) Inhalation two times a day  tobramycin for Nebulization 300 milliGRAM(s) Inhalation two times a day  sodium chloride 3%  Inhalation 4 milliLiter(s) Inhalation two times a day  insulin lispro Injectable (HumaLOG) 2 Unit(s) SubCutaneous before breakfast  insulin lispro (HumaLOG) corrective regimen sliding scale   SubCutaneous three times a day before meals  insulin lispro (HumaLOG) corrective regimen sliding scale   SubCutaneous at bedtime  dextrose 5%. 1000 milliLiter(s) (50 mL/Hr) IV Continuous <Continuous>  dextrose 50% Injectable 12.5 Gram(s) IV Push once  dextrose 50% Injectable 25 Gram(s) IV Push once  dextrose 50% Injectable 25 Gram(s) IV Push once  ALBUTerol    0.083% 2.5 milliGRAM(s) Nebulizer two times a day  buDESOnide 160 MICROgram(s)/formoterol 4.5 MICROgram(s) Inhaler 2 Puff(s) Inhalation two times a day  diphenhydrAMINE   Capsule 25 milliGRAM(s) Oral every 12 hours  ALBUTerol    0.083%. 2.5 milliGRAM(s) Nebulizer once    MEDICATIONS  (PRN):  dextrose Gel 1 Dose(s) Oral once PRN Blood Glucose LESS THAN 70 milliGRAM(s)/deciliter  glucagon  Injectable 1 milliGRAM(s) IntraMuscular once PRN Glucose LESS THAN 70 milligrams/deciliter  ALBUTerol    90 MICROgram(s) HFA Inhaler 2 Puff(s) Inhalation every 6 hours PRN Shortness of Breath and/or Wheezing        Objective:    Vitals: Vital Signs Last 24 Hrs  T(C): 37.1 (08-28-17 @ 06:28), Max: 37.4 (08-27-17 @ 21:51)  T(F): 98.7 (08-28-17 @ 06:28), Max: 99.3 (08-27-17 @ 21:51)  HR: 58 (08-28-17 @ 06:28) (55 - 79)  BP: 106/52 (08-28-17 @ 06:28) (106/52 - 115/83)  BP(mean): --  RR: 17 (08-28-17 @ 06:28) (17 - 18)  SpO2: 98% (08-28-17 @ 06:28) (97% - 98%)            I&O's Summary      PHYSICAL EXAM:  GENERAL: NAD, well-groomed, well-developed  HEAD:  Atraumatic, Normocephalic  CHEST/LUNG: coarse rhonci   HEART: Regular rate and rhythm; No murmurs, rubs, or gallops  ABDOMEN: Soft, Nontender, Nondistended; Bowel sounds present  EXTREMITIES:  2+ Peripheral Pulses, No clubbing, cyanosis, or edema                                          LABS:  08-27    137  |  99  |  14  ----------------------------<  144<H>  4.7   |  24  |  0.86    Ca    9.0      27 Aug 2017 06:45    TPro  7.7  /  Alb  3.5  /  TBili  0.3  /  DBili  x   /  AST  23  /  ALT  40  /  AlkPhos  113  08-27               14.6   5.87  )-----------( 285      ( 28 Aug 2017 06:10 )             43.1                         14.1   7.88  )-----------( 263      ( 27 Aug 2017 06:45 )             41.3     CAPILLARY BLOOD GLUCOSE  97 (27 Aug 2017 22:41)  209 (27 Aug 2017 17:05)  174 (27 Aug 2017 12:02)  134 (27 Aug 2017 08:23)

## 2017-08-29 LAB — VANCOMYCIN TROUGH SERPL-MCNC: 7.8 UG/ML — LOW (ref 10–20)

## 2017-08-29 PROCEDURE — 99233 SBSQ HOSP IP/OBS HIGH 50: CPT | Mod: GC

## 2017-08-29 RX ORDER — INSULIN GLARGINE 100 [IU]/ML
8 INJECTION, SOLUTION SUBCUTANEOUS AT BEDTIME
Qty: 0 | Refills: 0 | Status: DISCONTINUED | OUTPATIENT
Start: 2017-08-29 | End: 2017-08-31

## 2017-08-29 RX ADMIN — MEROPENEM 200 MILLIGRAM(S): 1 INJECTION INTRAVENOUS at 17:34

## 2017-08-29 RX ADMIN — DORNASE ALFA 1 MILLIGRAM(S): 1 SOLUTION RESPIRATORY (INHALATION) at 20:48

## 2017-08-29 RX ADMIN — BUDESONIDE AND FORMOTEROL FUMARATE DIHYDRATE 2 PUFF(S): 160; 4.5 AEROSOL RESPIRATORY (INHALATION) at 21:24

## 2017-08-29 RX ADMIN — MONTELUKAST 10 MILLIGRAM(S): 4 TABLET, CHEWABLE ORAL at 12:30

## 2017-08-29 RX ADMIN — Medication 3: at 21:23

## 2017-08-29 RX ADMIN — ALBUTEROL 2.5 MILLIGRAM(S): 90 AEROSOL, METERED ORAL at 20:31

## 2017-08-29 RX ADMIN — Medication 300 MILLIGRAM(S): at 18:10

## 2017-08-29 RX ADMIN — Medication 50 MILLIGRAM(S): at 21:24

## 2017-08-29 RX ADMIN — INSULIN GLARGINE 8 UNIT(S): 100 INJECTION, SOLUTION SUBCUTANEOUS at 21:24

## 2017-08-29 RX ADMIN — SODIUM CHLORIDE 4 MILLILITER(S): 9 INJECTION INTRAMUSCULAR; INTRAVENOUS; SUBCUTANEOUS at 20:38

## 2017-08-29 RX ADMIN — MEROPENEM 200 MILLIGRAM(S): 1 INJECTION INTRAVENOUS at 23:56

## 2017-08-29 RX ADMIN — FAMOTIDINE 20 MILLIGRAM(S): 10 INJECTION INTRAVENOUS at 12:30

## 2017-08-29 RX ADMIN — SODIUM CHLORIDE 4 MILLILITER(S): 9 INJECTION INTRAMUSCULAR; INTRAVENOUS; SUBCUTANEOUS at 10:01

## 2017-08-29 RX ADMIN — DORNASE ALFA 1 MILLIGRAM(S): 1 SOLUTION RESPIRATORY (INHALATION) at 10:01

## 2017-08-29 RX ADMIN — Medication 300 MILLIGRAM(S): at 06:37

## 2017-08-29 RX ADMIN — Medication 6 CAPSULE(S): at 08:32

## 2017-08-29 RX ADMIN — Medication 2 UNIT(S): at 08:31

## 2017-08-29 RX ADMIN — Medication 2: at 12:30

## 2017-08-29 RX ADMIN — ALBUTEROL 2.5 MILLIGRAM(S): 90 AEROSOL, METERED ORAL at 10:01

## 2017-08-29 RX ADMIN — MEROPENEM 200 MILLIGRAM(S): 1 INJECTION INTRAVENOUS at 09:56

## 2017-08-29 RX ADMIN — BUDESONIDE AND FORMOTEROL FUMARATE DIHYDRATE 2 PUFF(S): 160; 4.5 AEROSOL RESPIRATORY (INHALATION) at 08:32

## 2017-08-29 RX ADMIN — Medication 50 MILLIGRAM(S): at 05:12

## 2017-08-29 RX ADMIN — Medication 50 MILLIGRAM(S): at 15:04

## 2017-08-29 RX ADMIN — Medication 1: at 08:31

## 2017-08-29 NOTE — PROGRESS NOTE ADULT - SUBJECTIVE AND OBJECTIVE BOX
Subjective:    Patient is a 27y old  Male who presents with a chief complaint of worsening cough (27 Aug 2017 14:24) - no overnight events, tolerating ABx without issue; pending DC with Vanc dose, scripts already given for Adolfo and Aztreonam.      MEDICATIONS  (STANDING):  meropenem IVPB   IV Intermittent   aztreonam  IVPB   IV Intermittent   aztreonam  IVPB 2000 milliGRAM(s) IV Intermittent every 8 hours  meropenem IVPB 2000 milliGRAM(s) IV Intermittent every 8 hours  amylase/lipase/protease  (CREON 24,000 Units) 6 Capsule(s) Oral three times a day with meals  montelukast 10 milliGRAM(s) Oral daily  famotidine    Tablet 20 milliGRAM(s) Oral daily  dornase melody Solution 1 milliGRAM(s) Inhalation two times a day  sodium chloride 3%  Inhalation 4 milliLiter(s) Inhalation two times a day  insulin lispro Injectable (HumaLOG) 2 Unit(s) SubCutaneous before breakfast  insulin lispro (HumaLOG) corrective regimen sliding scale   SubCutaneous three times a day before meals  insulin lispro (HumaLOG) corrective regimen sliding scale   SubCutaneous at bedtime  dextrose 5%. 1000 milliLiter(s) (50 mL/Hr) IV Continuous <Continuous>  dextrose 50% Injectable 12.5 Gram(s) IV Push once  dextrose 50% Injectable 25 Gram(s) IV Push once  dextrose 50% Injectable 25 Gram(s) IV Push once  ALBUTerol    0.083% 2.5 milliGRAM(s) Nebulizer two times a day  buDESOnide 160 MICROgram(s)/formoterol 4.5 MICROgram(s) Inhaler 2 Puff(s) Inhalation two times a day  diphenhydrAMINE   Capsule 25 milliGRAM(s) Oral every 12 hours  ALBUTerol    0.083%. 2.5 milliGRAM(s) Nebulizer once  vancomycin  IVPB 1500 milliGRAM(s) IV Intermittent every 12 hours    MEDICATIONS  (PRN):  dextrose Gel 1 Dose(s) Oral once PRN Blood Glucose LESS THAN 70 milliGRAM(s)/deciliter  glucagon  Injectable 1 milliGRAM(s) IntraMuscular once PRN Glucose LESS THAN 70 milligrams/deciliter  ALBUTerol    90 MICROgram(s) HFA Inhaler 2 Puff(s) Inhalation every 6 hours PRN Shortness of Breath and/or Wheezing  ibuprofen  Tablet 400 milliGRAM(s) Oral every 6 hours PRN mild-moderate pain    Objective:    Vitals: Vital Signs Last 24 Hrs  T(C): 36.8 (08-29-17 @ 06:10), Max: 37.7 (08-28-17 @ 13:02)  T(F): 98.2 (08-29-17 @ 06:10), Max: 99.8 (08-28-17 @ 13:02)  HR: 80 (08-29-17 @ 06:10) (73 - 98)  BP: 98/53 (08-29-17 @ 06:10) (98/53 - 123/76)  BP(mean): --  RR: 18 (08-29-17 @ 06:10) (17 - 18)  SpO2: 99% (08-29-17 @ 06:10) (94% - 100%)            I&O's Summary      PHYSICAL EXAM:  GENERAL: NAD, well-groomed, well-developed  HEAD:  Atraumatic, Normocephalic  CHEST/LUNG: coarse breath sounds L > R  HEART: Regular rate and rhythm; No murmurs, rubs, or gallops  ABDOMEN: Soft, Nontender, Nondistended; Bowel sounds present                                         LABS:  08-28    135  |  98  |  11  ----------------------------<  132<H>  4.4   |  25  |  0.88  08-27    137  |  99  |  14  ----------------------------<  144<H>  4.7   |  24  |  0.86    Ca    9.1      28 Aug 2017 06:10  Ca    9.0      27 Aug 2017 06:45  Phos  3.6     08-28  Mg     2.0     08-28    TPro  7.9  /  Alb  3.6  /  TBili  0.4  /  DBili  x   /  AST  27  /  ALT  44<H>  /  AlkPhos  134<H>  08-28  TPro  7.7  /  Alb  3.5  /  TBili  0.3  /  DBili  x   /  AST  23  /  ALT  40  /  AlkPhos  113  08-27               14.6   5.87  )-----------( 285      ( 28 Aug 2017 06:10 )             43.1                         14.1   7.88  )-----------( 263      ( 27 Aug 2017 06:45 )             41.3     CAPILLARY BLOOD GLUCOSE  323 (28 Aug 2017 21:50)  279 (28 Aug 2017 17:17)  154 (28 Aug 2017 12:12)  107 (28 Aug 2017 08:19)

## 2017-08-30 LAB — VANCOMYCIN TROUGH SERPL-MCNC: 10.3 UG/ML — SIGNIFICANT CHANGE UP (ref 10–20)

## 2017-08-30 RX ORDER — INSULIN GLARGINE 100 [IU]/ML
8 INJECTION, SOLUTION SUBCUTANEOUS
Qty: 1 | Refills: 1 | OUTPATIENT
Start: 2017-08-30 | End: 2017-10-28

## 2017-08-30 RX ADMIN — Medication 6 CAPSULE(S): at 17:31

## 2017-08-30 RX ADMIN — Medication 1: at 17:31

## 2017-08-30 RX ADMIN — Medication 300 MILLIGRAM(S): at 17:08

## 2017-08-30 RX ADMIN — Medication 2: at 08:44

## 2017-08-30 RX ADMIN — Medication 50 MILLIGRAM(S): at 05:08

## 2017-08-30 RX ADMIN — MEROPENEM 200 MILLIGRAM(S): 1 INJECTION INTRAVENOUS at 08:44

## 2017-08-30 RX ADMIN — Medication 50 MILLIGRAM(S): at 13:56

## 2017-08-30 RX ADMIN — Medication 50 MILLIGRAM(S): at 21:38

## 2017-08-30 RX ADMIN — Medication 6 CAPSULE(S): at 13:04

## 2017-08-30 RX ADMIN — Medication 6 CAPSULE(S): at 08:44

## 2017-08-30 RX ADMIN — SODIUM CHLORIDE 4 MILLILITER(S): 9 INJECTION INTRAMUSCULAR; INTRAVENOUS; SUBCUTANEOUS at 20:18

## 2017-08-30 RX ADMIN — Medication 25 MILLIGRAM(S): at 17:08

## 2017-08-30 RX ADMIN — ALBUTEROL 2.5 MILLIGRAM(S): 90 AEROSOL, METERED ORAL at 20:17

## 2017-08-30 RX ADMIN — DORNASE ALFA 1 MILLIGRAM(S): 1 SOLUTION RESPIRATORY (INHALATION) at 10:08

## 2017-08-30 RX ADMIN — MONTELUKAST 10 MILLIGRAM(S): 4 TABLET, CHEWABLE ORAL at 13:04

## 2017-08-30 RX ADMIN — BUDESONIDE AND FORMOTEROL FUMARATE DIHYDRATE 2 PUFF(S): 160; 4.5 AEROSOL RESPIRATORY (INHALATION) at 21:39

## 2017-08-30 RX ADMIN — Medication 300 MILLIGRAM(S): at 06:14

## 2017-08-30 RX ADMIN — SODIUM CHLORIDE 4 MILLILITER(S): 9 INJECTION INTRAMUSCULAR; INTRAVENOUS; SUBCUTANEOUS at 09:59

## 2017-08-30 RX ADMIN — MEROPENEM 200 MILLIGRAM(S): 1 INJECTION INTRAVENOUS at 16:31

## 2017-08-30 RX ADMIN — BUDESONIDE AND FORMOTEROL FUMARATE DIHYDRATE 2 PUFF(S): 160; 4.5 AEROSOL RESPIRATORY (INHALATION) at 10:16

## 2017-08-30 RX ADMIN — ALBUTEROL 2.5 MILLIGRAM(S): 90 AEROSOL, METERED ORAL at 09:52

## 2017-08-30 RX ADMIN — FAMOTIDINE 20 MILLIGRAM(S): 10 INJECTION INTRAVENOUS at 13:04

## 2017-08-30 RX ADMIN — DORNASE ALFA 1 MILLIGRAM(S): 1 SOLUTION RESPIRATORY (INHALATION) at 21:56

## 2017-08-30 RX ADMIN — INSULIN GLARGINE 8 UNIT(S): 100 INJECTION, SOLUTION SUBCUTANEOUS at 21:38

## 2017-08-30 NOTE — PROGRESS NOTE ADULT - ATTENDING COMMENTS
Pt seen and examined, agree with above A+P. CF exacerbation, h/o MSSA, MRSA, Burkholderia Cepacia, Pseudomonas and Burkholderia multivorans, improving on current antibiotic regimen. Cont pulmonary toilet. PICC line placed. Will require antibiotics at home until 9/8. Outpt pulmonary FUP with Dr. Wesley.
Pt gera and examined, agree with above A+P. CF exacerbation improving on current antibiotics. Plan for Vanco/ Aztreonam and Meropenem until 9/8. Cont pulm clearance. Ongoing hyperglycemia at least partly due to D5W load with antibiotics and acute infection. Increase to Lantus 10 units HS, cont insulin S/S coverage. Will arrange follow up appt with his outpt endocrinologist.
Pt seen and examined, agree with above A+P. CF exacerbation, improving on current antibiotics. Vanco level increased to 1500 mg BID as trough was subtherapeutic, Follow up level in am. Ongoing hyperglycemia. Will optimize insulin regimen.

## 2017-08-30 NOTE — PROGRESS NOTE ADULT - PROBLEM SELECTOR PLAN 1
Likely the reason for increased cough and sputum production  -IV meropenem, aztreonam and vancomycin   -PICC line placement pending - d/c with PICC for IV antibiotics through 9/8  -sputum culture shows Gm + cocci  - f/u acid fast sputum for atypical mycobacteria  -c/w home CF medications

## 2017-08-30 NOTE — PROGRESS NOTE ADULT - SUBJECTIVE AND OBJECTIVE BOX
Subjective:    Patient is a 27y old  Male who presents with a chief complaint of worsening cough (27 Aug 2017 14:24) - no overnight events      MEDICATIONS  (STANDING):  meropenem IVPB   IV Intermittent   aztreonam  IVPB   IV Intermittent   aztreonam  IVPB 2000 milliGRAM(s) IV Intermittent every 8 hours  meropenem IVPB 2000 milliGRAM(s) IV Intermittent every 8 hours  amylase/lipase/protease  (CREON 24,000 Units) 6 Capsule(s) Oral three times a day with meals  montelukast 10 milliGRAM(s) Oral daily  famotidine    Tablet 20 milliGRAM(s) Oral daily  dornase melody Solution 1 milliGRAM(s) Inhalation two times a day  sodium chloride 3%  Inhalation 4 milliLiter(s) Inhalation two times a day  insulin lispro (HumaLOG) corrective regimen sliding scale   SubCutaneous three times a day before meals  insulin lispro (HumaLOG) corrective regimen sliding scale   SubCutaneous at bedtime  dextrose 5%. 1000 milliLiter(s) (50 mL/Hr) IV Continuous <Continuous>  dextrose 50% Injectable 12.5 Gram(s) IV Push once  dextrose 50% Injectable 25 Gram(s) IV Push once  dextrose 50% Injectable 25 Gram(s) IV Push once  ALBUTerol    0.083% 2.5 milliGRAM(s) Nebulizer two times a day  buDESOnide 160 MICROgram(s)/formoterol 4.5 MICROgram(s) Inhaler 2 Puff(s) Inhalation two times a day  diphenhydrAMINE   Capsule 25 milliGRAM(s) Oral every 12 hours  ALBUTerol    0.083%. 2.5 milliGRAM(s) Nebulizer once  vancomycin  IVPB 1500 milliGRAM(s) IV Intermittent every 12 hours  insulin glargine Injectable (LANTUS) 8 Unit(s) SubCutaneous at bedtime    MEDICATIONS  (PRN):  dextrose Gel 1 Dose(s) Oral once PRN Blood Glucose LESS THAN 70 milliGRAM(s)/deciliter  glucagon  Injectable 1 milliGRAM(s) IntraMuscular once PRN Glucose LESS THAN 70 milligrams/deciliter  ALBUTerol    90 MICROgram(s) HFA Inhaler 2 Puff(s) Inhalation every 6 hours PRN Shortness of Breath and/or Wheezing  ibuprofen  Tablet 400 milliGRAM(s) Oral every 6 hours PRN mild-moderate pain    Objective:    Vitals: Vital Signs Last 24 Hrs  T(C): 36.6 (08-30-17 @ 05:19), Max: 36.7 (08-29-17 @ 13:11)  T(F): 97.8 (08-30-17 @ 05:19), Max: 98.1 (08-29-17 @ 13:11)  HR: 64 (08-30-17 @ 05:19) (64 - 90)  BP: 109/57 (08-30-17 @ 05:19) (109/57 - 127/74)  BP(mean): --  RR: 18 (08-30-17 @ 05:19) (16 - 18)  SpO2: 99% (08-30-17 @ 05:19) (95% - 100%)            I&O's Summary      PHYSICAL EXAM:  GENERAL: NAD, well-groomed, well-developed  HEAD:  Atraumatic, Normocephalic  CHEST/LUNG: coarse breath sounds   HEART: Regular rate and rhythm; No murmurs, rubs, or gallops  ABDOMEN: Soft, Nontender, Nondistended; Bowel sounds present                                     LABS:  08-28    135  |  98  |  11  ----------------------------<  132<H>  4.4   |  25  |  0.88    Ca    9.1      28 Aug 2017 06:10    TPro  7.9  /  Alb  3.6  /  TBili  0.4  /  DBili  x   /  AST  27  /  ALT  44<H>  /  AlkPhos  134<H>  08-28                        14.6   5.87  )-----------( 285      ( 28 Aug 2017 06:10 )             43.1     CAPILLARY BLOOD GLUCOSE  365 (29 Aug 2017 21:14)  136 (29 Aug 2017 17:33)  238 (29 Aug 2017 11:57)  195 (29 Aug 2017 08:25)

## 2017-08-31 VITALS
TEMPERATURE: 98 F | WEIGHT: 132.28 LBS | OXYGEN SATURATION: 99 % | SYSTOLIC BLOOD PRESSURE: 103 MMHG | RESPIRATION RATE: 18 BRPM | DIASTOLIC BLOOD PRESSURE: 58 MMHG | HEART RATE: 56 BPM

## 2017-08-31 LAB — VANCOMYCIN TROUGH SERPL-MCNC: 12.5 UG/ML — SIGNIFICANT CHANGE UP (ref 10–20)

## 2017-08-31 RX ADMIN — Medication 300 MILLIGRAM(S): at 06:33

## 2017-08-31 RX ADMIN — MEROPENEM 200 MILLIGRAM(S): 1 INJECTION INTRAVENOUS at 00:34

## 2017-08-31 RX ADMIN — DORNASE ALFA 1 MILLIGRAM(S): 1 SOLUTION RESPIRATORY (INHALATION) at 09:02

## 2017-08-31 RX ADMIN — MEROPENEM 200 MILLIGRAM(S): 1 INJECTION INTRAVENOUS at 08:39

## 2017-08-31 RX ADMIN — BUDESONIDE AND FORMOTEROL FUMARATE DIHYDRATE 2 PUFF(S): 160; 4.5 AEROSOL RESPIRATORY (INHALATION) at 08:39

## 2017-08-31 RX ADMIN — SODIUM CHLORIDE 4 MILLILITER(S): 9 INJECTION INTRAMUSCULAR; INTRAVENOUS; SUBCUTANEOUS at 09:02

## 2017-08-31 RX ADMIN — Medication 50 MILLIGRAM(S): at 05:09

## 2017-08-31 RX ADMIN — ALBUTEROL 2.5 MILLIGRAM(S): 90 AEROSOL, METERED ORAL at 09:04

## 2017-08-31 NOTE — PROGRESS NOTE ADULT - SUBJECTIVE AND OBJECTIVE BOX
Subjective:    Patient is a 27y old  Male who presents with a chief complaint of worsening cough (27 Aug 2017 14:24) - no overnight    MEDICATIONS  (STANDING):  meropenem IVPB   IV Intermittent   aztreonam  IVPB   IV Intermittent   aztreonam  IVPB 2000 milliGRAM(s) IV Intermittent every 8 hours  meropenem IVPB 2000 milliGRAM(s) IV Intermittent every 8 hours  amylase/lipase/protease  (CREON 24,000 Units) 6 Capsule(s) Oral three times a day with meals  montelukast 10 milliGRAM(s) Oral daily  famotidine    Tablet 20 milliGRAM(s) Oral daily  dornase melody Solution 1 milliGRAM(s) Inhalation two times a day  sodium chloride 3%  Inhalation 4 milliLiter(s) Inhalation two times a day  insulin lispro (HumaLOG) corrective regimen sliding scale   SubCutaneous three times a day before meals  insulin lispro (HumaLOG) corrective regimen sliding scale   SubCutaneous at bedtime  dextrose 5%. 1000 milliLiter(s) (50 mL/Hr) IV Continuous <Continuous>  dextrose 50% Injectable 12.5 Gram(s) IV Push once  dextrose 50% Injectable 25 Gram(s) IV Push once  dextrose 50% Injectable 25 Gram(s) IV Push once  ALBUTerol    0.083% 2.5 milliGRAM(s) Nebulizer two times a day  buDESOnide 160 MICROgram(s)/formoterol 4.5 MICROgram(s) Inhaler 2 Puff(s) Inhalation two times a day  diphenhydrAMINE   Capsule 25 milliGRAM(s) Oral every 12 hours  ALBUTerol    0.083%. 2.5 milliGRAM(s) Nebulizer once  vancomycin  IVPB 1500 milliGRAM(s) IV Intermittent every 12 hours  insulin glargine Injectable (LANTUS) 8 Unit(s) SubCutaneous at bedtime    MEDICATIONS  (PRN):  dextrose Gel 1 Dose(s) Oral once PRN Blood Glucose LESS THAN 70 milliGRAM(s)/deciliter  glucagon  Injectable 1 milliGRAM(s) IntraMuscular once PRN Glucose LESS THAN 70 milligrams/deciliter  ALBUTerol    90 MICROgram(s) HFA Inhaler 2 Puff(s) Inhalation every 6 hours PRN Shortness of Breath and/or Wheezing  ibuprofen  Tablet 400 milliGRAM(s) Oral every 6 hours PRN mild-moderate pain        Objective:    Vitals: Vital Signs Last 24 Hrs  T(C): 36.7 (08-31-17 @ 05:01), Max: 36.7 (08-30-17 @ 21:34)  T(F): 98.1 (08-31-17 @ 05:01), Max: 98.1 (08-31-17 @ 05:01)  HR: 56 (08-31-17 @ 05:01) (56 - 92)  BP: 103/58 (08-31-17 @ 05:01) (103/58 - 122/73)  BP(mean): --  RR: 18 (08-31-17 @ 05:01) (18 - 18)  SpO2: 99% (08-31-17 @ 05:01) (97% - 100%)            I&O's Summary      PHYSICAL EXAM:  GENERAL: NAD, well-groomed, well-developed  HEAD:  Atraumatic, Normocephalic  EYES: EOMI, PERRLA, conjunctiva and sclera clear  ENMT: No tonsillar erythema, exudates, or enlargement; Moist mucous membranes, Good dentition, No lesions  NECK: Supple, No JVD, Normal thyroid  CHEST/LUNG: coarse breath sounds   HEART: Regular rate and rhythm; No murmurs, rubs, or gallops  ABDOMEN: Soft, Nontender, Nondistended; Bowel sounds present                                           LABS:    CAPILLARY BLOOD GLUCOSE  239 (30 Aug 2017 21:34)  185 (30 Aug 2017 17:29)  90 (30 Aug 2017 12:08)  212 (30 Aug 2017 08:24)

## 2017-08-31 NOTE — PROGRESS NOTE ADULT - PROBLEM SELECTOR PLAN 3
-c/w home 2U humalog before breakfast; started Lantus 8 U; appt for this Friday with private endocrinologist   -ISS, FS qhs and qac  -A1c 7.1

## 2017-09-01 ENCOUNTER — APPOINTMENT (OUTPATIENT)
Dept: ENDOCRINOLOGY | Facility: CLINIC | Age: 27
End: 2017-09-01
Payer: COMMERCIAL

## 2017-09-01 VITALS
SYSTOLIC BLOOD PRESSURE: 118 MMHG | OXYGEN SATURATION: 98 % | DIASTOLIC BLOOD PRESSURE: 74 MMHG | BODY MASS INDEX: 21.83 KG/M2 | HEART RATE: 86 BPM | HEIGHT: 65 IN | WEIGHT: 131 LBS

## 2017-09-01 LAB
-  CEFAZOLIN: SIGNIFICANT CHANGE UP
-  CIPROFLOXACIN: SIGNIFICANT CHANGE UP
-  CLINDAMYCIN: SIGNIFICANT CHANGE UP
-  ERYTHROMYCIN: SIGNIFICANT CHANGE UP
-  GENTAMICIN: SIGNIFICANT CHANGE UP
-  MOXIFLOXACIN(AEROBIC): SIGNIFICANT CHANGE UP
-  OXACILLIN: SIGNIFICANT CHANGE UP
-  RIFAMPIN.: SIGNIFICANT CHANGE UP
-  TETRACYCLINE: SIGNIFICANT CHANGE UP
-  TRIMETHOPRIM/SULFAMETHOXAZOLE: SIGNIFICANT CHANGE UP
-  VANCOMYCIN: SIGNIFICANT CHANGE UP
BACTERIA SPT RESP CULT: SIGNIFICANT CHANGE UP
GRAM STN SPT: SIGNIFICANT CHANGE UP
METHOD TYPE: SIGNIFICANT CHANGE UP
ORGANISM # SPEC MICROSCOPIC CNT: SIGNIFICANT CHANGE UP
ORGANISM # SPEC MICROSCOPIC CNT: SIGNIFICANT CHANGE UP

## 2017-09-01 PROCEDURE — 99213 OFFICE O/P EST LOW 20 MIN: CPT

## 2017-09-05 ENCOUNTER — MEDICATION RENEWAL (OUTPATIENT)
Age: 27
End: 2017-09-05

## 2017-09-07 ENCOUNTER — MEDICATION RENEWAL (OUTPATIENT)
Age: 27
End: 2017-09-07

## 2017-09-08 ENCOUNTER — MEDICATION RENEWAL (OUTPATIENT)
Age: 27
End: 2017-09-08

## 2017-09-08 VITALS
HEIGHT: 65 IN | RESPIRATION RATE: 18 BRPM | OXYGEN SATURATION: 96 % | SYSTOLIC BLOOD PRESSURE: 118 MMHG | DIASTOLIC BLOOD PRESSURE: 72 MMHG | WEIGHT: 132 LBS | BODY MASS INDEX: 21.99 KG/M2 | HEART RATE: 83 BPM | TEMPERATURE: 97.4 F

## 2017-09-29 ENCOUNTER — APPOINTMENT (OUTPATIENT)
Dept: ENDOCRINOLOGY | Facility: CLINIC | Age: 27
End: 2017-09-29

## 2017-10-06 ENCOUNTER — APPOINTMENT (OUTPATIENT)
Dept: PULMONOLOGY | Facility: CLINIC | Age: 27
End: 2017-10-06

## 2017-10-18 ENCOUNTER — APPOINTMENT (OUTPATIENT)
Dept: PULMONOLOGY | Facility: CLINIC | Age: 27
End: 2017-10-18

## 2017-10-20 ENCOUNTER — RX RENEWAL (OUTPATIENT)
Age: 27
End: 2017-10-20

## 2017-11-06 ENCOUNTER — APPOINTMENT (OUTPATIENT)
Dept: PULMONOLOGY | Facility: CLINIC | Age: 27
End: 2017-11-06

## 2017-11-06 VITALS
HEIGHT: 65 IN | WEIGHT: 133 LBS | HEART RATE: 84 BPM | DIASTOLIC BLOOD PRESSURE: 70 MMHG | RESPIRATION RATE: 18 BRPM | OXYGEN SATURATION: 98 % | TEMPERATURE: 98.2 F | SYSTOLIC BLOOD PRESSURE: 130 MMHG | BODY MASS INDEX: 22.16 KG/M2

## 2017-11-10 ENCOUNTER — APPOINTMENT (OUTPATIENT)
Dept: PULMONOLOGY | Facility: CLINIC | Age: 27
End: 2017-11-10

## 2017-11-10 ENCOUNTER — MEDICATION RENEWAL (OUTPATIENT)
Age: 27
End: 2017-11-10

## 2017-11-10 VITALS
RESPIRATION RATE: 16 BRPM | SYSTOLIC BLOOD PRESSURE: 130 MMHG | HEART RATE: 100 BPM | WEIGHT: 136 LBS | HEIGHT: 65 IN | TEMPERATURE: 98 F | OXYGEN SATURATION: 100 % | BODY MASS INDEX: 22.66 KG/M2 | DIASTOLIC BLOOD PRESSURE: 70 MMHG

## 2017-11-13 LAB — BACTERIA SPT CF RESP CULT: ABNORMAL

## 2017-11-16 ENCOUNTER — LABORATORY RESULT (OUTPATIENT)
Age: 27
End: 2017-11-16

## 2017-11-16 ENCOUNTER — APPOINTMENT (OUTPATIENT)
Dept: PULMONOLOGY | Facility: CLINIC | Age: 27
End: 2017-11-16
Payer: COMMERCIAL

## 2017-11-16 VITALS
WEIGHT: 133 LBS | TEMPERATURE: 99.9 F | HEIGHT: 65 IN | OXYGEN SATURATION: 99 % | SYSTOLIC BLOOD PRESSURE: 124 MMHG | HEART RATE: 109 BPM | RESPIRATION RATE: 15 BRPM | BODY MASS INDEX: 22.16 KG/M2 | DIASTOLIC BLOOD PRESSURE: 62 MMHG

## 2017-11-16 DIAGNOSIS — R74.8 ABNORMAL LEVELS OF OTHER SERUM ENZYMES: ICD-10-CM

## 2017-11-16 PROCEDURE — 99215 OFFICE O/P EST HI 40 MIN: CPT | Mod: 25

## 2017-11-16 PROCEDURE — G0008: CPT

## 2017-11-16 PROCEDURE — 90686 IIV4 VACC NO PRSV 0.5 ML IM: CPT

## 2017-11-16 RX ORDER — PREDNISONE 20 MG/1
20 TABLET ORAL DAILY
Qty: 7 | Refills: 0 | Status: DISCONTINUED | COMMUNITY
Start: 2017-07-27 | End: 2017-11-16

## 2017-11-16 RX ORDER — SULFAMETHOXAZOLE AND TRIMETHOPRIM 800; 160 MG/1; MG/1
800-160 TABLET ORAL
Qty: 42 | Refills: 0 | Status: COMPLETED | COMMUNITY
Start: 2017-05-22 | End: 2017-11-16

## 2017-11-16 RX ORDER — AMOXICILLIN 875 MG/1
875 TABLET, FILM COATED ORAL
Qty: 14 | Refills: 0 | Status: DISCONTINUED | COMMUNITY
Start: 2017-06-22 | End: 2017-11-16

## 2017-11-16 RX ORDER — OXYCODONE AND ACETAMINOPHEN 5; 325 MG/1; MG/1
5-325 TABLET ORAL
Qty: 4 | Refills: 0 | Status: DISCONTINUED | COMMUNITY
Start: 2017-06-22 | End: 2017-11-16

## 2017-11-16 RX ORDER — FLUCONAZOLE 150 MG/1
150 TABLET ORAL
Qty: 3 | Refills: 0 | Status: DISCONTINUED | COMMUNITY
Start: 2017-09-07 | End: 2017-11-16

## 2017-11-20 LAB
25(OH)D3 SERPL-MCNC: 27.7 NG/ML
A FLAVUS AB FLD QL: NEGATIVE
A FUMIGATUS AB FLD QL: NEGATIVE
A FUMIGATUS IGE QN: 0.29 KUA/L
A NIGER AB FLD QL: NEGATIVE
ALBUMIN SERPL ELPH-MCNC: 4.1 G/DL
ALP BLD-CCNC: 100 U/L
ALT SERPL-CCNC: 20 U/L
ANION GAP SERPL CALC-SCNC: 16 MMOL/L
APPEARANCE: CLEAR
AST SERPL-CCNC: 19 U/L
BASOPHILS # BLD AUTO: 0.01 K/UL
BASOPHILS NFR BLD AUTO: 0.1 %
BILIRUB SERPL-MCNC: 0.5 MG/DL
BILIRUBIN URINE: NEGATIVE
BLOOD URINE: NEGATIVE
BUN SERPL-MCNC: 10 MG/DL
CALCIUM SERPL-MCNC: 9.7 MG/DL
CHLORIDE SERPL-SCNC: 99 MMOL/L
CK SERPL-CCNC: 115 U/L
CO2 SERPL-SCNC: 24 MMOL/L
COLOR: ABNORMAL
CREAT SERPL-MCNC: 0.92 MG/DL
CREAT SPEC-SCNC: 243 MG/DL
DEPRECATED A FUMIGATUS IGE RAST QL: NORMAL
EOSINOPHIL # BLD AUTO: 0.08 K/UL
EOSINOPHIL NFR BLD AUTO: 1 %
GLUCOSE QUALITATIVE U: 100 MG/DL
GLUCOSE SERPL-MCNC: 60 MG/DL
HBA1C MFR BLD HPLC: 7.2 %
HCT VFR BLD CALC: 40.7 %
HGB BLD-MCNC: 14.2 G/DL
IMM GRANULOCYTES NFR BLD AUTO: 0.1 %
KETONES URINE: NEGATIVE
LEUKOCYTE ESTERASE URINE: NEGATIVE
LYMPHOCYTES # BLD AUTO: 1.32 K/UL
LYMPHOCYTES NFR BLD AUTO: 16.3 %
MAN DIFF?: NORMAL
MCHC RBC-ENTMCNC: 31 PG
MCHC RBC-ENTMCNC: 34.9 GM/DL
MCV RBC AUTO: 88.9 FL
MICROALBUMIN 24H UR DL<=1MG/L-MCNC: 3.5 MG/DL
MICROALBUMIN/CREAT 24H UR-RTO: 14 MG/G
MONOCYTES # BLD AUTO: 0.77 K/UL
MONOCYTES NFR BLD AUTO: 9.5 %
NEUTROPHILS # BLD AUTO: 5.92 K/UL
NEUTROPHILS NFR BLD AUTO: 73 %
NITRITE URINE: NEGATIVE
PH URINE: 5
PLATELET # BLD AUTO: 265 K/UL
POTASSIUM SERPL-SCNC: 4.1 MMOL/L
PROT SERPL-MCNC: 8.4 G/DL
PROTEIN URINE: NEGATIVE MG/DL
RBC # BLD: 4.58 M/UL
RBC # FLD: 13.9 %
SODIUM SERPL-SCNC: 139 MMOL/L
SPECIFIC GRAVITY URINE: 1.03
TOTAL IGE SMQN RAST: 27 KU/L
UROBILINOGEN URINE: NEGATIVE MG/DL
WBC # FLD AUTO: 8.11 K/UL

## 2017-11-21 LAB
BACTERIA SPT CF RESP CULT: ABNORMAL
MENADIONE SERPL-MCNC: 0.69 NG/ML
VIT A SERPL-MCNC: 23 UG/DL

## 2017-11-22 LAB
A-TOCOPHEROL VIT E SERPL-MCNC: 5.2 MG/L
BETA+GAMMA TOCOPHEROL SERPL-MCNC: <1 MG/L

## 2017-11-24 LAB
ASPERGILLUS FLAVUS PRECIPITINS: NEGATIVE
ASPERGILLUS FUMIGATES PRECIPTINS: NEGATIVE
ASPERGILLUS NIGER PRECIPITINS: NEGATIVE

## 2017-12-04 ENCOUNTER — APPOINTMENT (OUTPATIENT)
Dept: PULMONOLOGY | Facility: CLINIC | Age: 27
End: 2017-12-04

## 2017-12-06 LAB
HADV DNA SPEC QL NAA+PROBE: DETECTED
RAPID RVP RESULT: DETECTED
RV+EV RNA SPEC QL NAA+PROBE: DETECTED

## 2017-12-13 ENCOUNTER — MEDICATION RENEWAL (OUTPATIENT)
Age: 27
End: 2017-12-13

## 2017-12-14 ENCOUNTER — OTHER (OUTPATIENT)
Age: 27
End: 2017-12-14

## 2017-12-14 LAB — BACTERIA SPT CF RESP CULT: ABNORMAL

## 2017-12-22 ENCOUNTER — OTHER (OUTPATIENT)
Age: 27
End: 2017-12-22

## 2017-12-29 LAB — ACID FAST STN SPT: NORMAL

## 2018-01-08 LAB — ACID FAST STN SPT: NORMAL

## 2018-01-16 ENCOUNTER — APPOINTMENT (OUTPATIENT)
Dept: PULMONOLOGY | Facility: CLINIC | Age: 28
End: 2018-01-16
Payer: COMMERCIAL

## 2018-01-16 VITALS
SYSTOLIC BLOOD PRESSURE: 100 MMHG | DIASTOLIC BLOOD PRESSURE: 60 MMHG | WEIGHT: 130 LBS | TEMPERATURE: 99.6 F | OXYGEN SATURATION: 97 % | HEIGHT: 65 IN | RESPIRATION RATE: 15 BRPM | HEART RATE: 100 BPM | BODY MASS INDEX: 21.66 KG/M2

## 2018-01-16 PROCEDURE — 99215 OFFICE O/P EST HI 40 MIN: CPT

## 2018-01-16 RX ORDER — LINEZOLID 600 MG/1
600 TABLET, FILM COATED ORAL
Qty: 28 | Refills: 0 | Status: DISCONTINUED | COMMUNITY
Start: 2017-12-04 | End: 2018-01-16

## 2018-01-16 RX ORDER — RIFAMPIN 300 MG/1
300 CAPSULE ORAL DAILY
Qty: 28 | Refills: 0 | Status: DISCONTINUED | COMMUNITY
Start: 2017-11-16 | End: 2018-01-16

## 2018-01-16 RX ORDER — SULFAMETHOXAZOLE AND TRIMETHOPRIM 800; 160 MG/1; MG/1
800-160 TABLET ORAL
Qty: 42 | Refills: 0 | Status: DISCONTINUED | COMMUNITY
Start: 2017-11-16 | End: 2018-01-16

## 2018-01-17 LAB
ALBUMIN SERPL ELPH-MCNC: 4.3 G/DL
ALP BLD-CCNC: 132 U/L
ALT SERPL-CCNC: 27 U/L
ANION GAP SERPL CALC-SCNC: 15 MMOL/L
AST SERPL-CCNC: 22 U/L
BASOPHILS # BLD AUTO: 0.01 K/UL
BASOPHILS NFR BLD AUTO: 0.1 %
BILIRUB SERPL-MCNC: 0.5 MG/DL
BUN SERPL-MCNC: 12 MG/DL
CALCIUM SERPL-MCNC: 9.4 MG/DL
CHLORIDE SERPL-SCNC: 98 MMOL/L
CK SERPL-CCNC: 148 U/L
CO2 SERPL-SCNC: 28 MMOL/L
CREAT SERPL-MCNC: 1.13 MG/DL
EOSINOPHIL # BLD AUTO: 0.01 K/UL
EOSINOPHIL NFR BLD AUTO: 0.1 %
GLUCOSE SERPL-MCNC: 115 MG/DL
HCT VFR BLD CALC: 42.2 %
HGB BLD-MCNC: 14.3 G/DL
IMM GRANULOCYTES NFR BLD AUTO: 0.2 %
LYMPHOCYTES # BLD AUTO: 1.75 K/UL
LYMPHOCYTES NFR BLD AUTO: 10.2 %
MAN DIFF?: NORMAL
MCHC RBC-ENTMCNC: 29.4 PG
MCHC RBC-ENTMCNC: 33.9 GM/DL
MCV RBC AUTO: 86.7 FL
MONOCYTES # BLD AUTO: 0.78 K/UL
MONOCYTES NFR BLD AUTO: 4.5 %
NEUTROPHILS # BLD AUTO: 14.64 K/UL
NEUTROPHILS NFR BLD AUTO: 84.9 %
PLATELET # BLD AUTO: 287 K/UL
POTASSIUM SERPL-SCNC: 4.5 MMOL/L
PROT SERPL-MCNC: 8.9 G/DL
RAPID RVP RESULT: NOT DETECTED
RBC # BLD: 4.87 M/UL
RBC # FLD: 13.2 %
SODIUM SERPL-SCNC: 141 MMOL/L
WBC # FLD AUTO: 17.23 K/UL

## 2018-01-22 LAB — BACTERIA SPT CF RESP CULT: ABNORMAL

## 2018-01-23 ENCOUNTER — MEDICATION RENEWAL (OUTPATIENT)
Age: 28
End: 2018-01-23

## 2018-01-23 ENCOUNTER — APPOINTMENT (OUTPATIENT)
Dept: ENDOCRINOLOGY | Facility: CLINIC | Age: 28
End: 2018-01-23

## 2018-01-25 LAB — ACID FAST STN SPT: NORMAL

## 2018-02-06 ENCOUNTER — APPOINTMENT (OUTPATIENT)
Dept: PULMONOLOGY | Facility: CLINIC | Age: 28
End: 2018-02-06

## 2018-02-21 ENCOUNTER — INPATIENT (INPATIENT)
Facility: HOSPITAL | Age: 28
LOS: 4 days | Discharge: HOME HEALTH | End: 2018-02-26
Attending: INTERNAL MEDICINE | Admitting: INTERNAL MEDICINE
Payer: COMMERCIAL

## 2018-02-21 VITALS
HEART RATE: 70 BPM | SYSTOLIC BLOOD PRESSURE: 121 MMHG | OXYGEN SATURATION: 97 % | TEMPERATURE: 98 F | DIASTOLIC BLOOD PRESSURE: 68 MMHG | RESPIRATION RATE: 18 BRPM

## 2018-02-21 DIAGNOSIS — R06.02 SHORTNESS OF BREATH: ICD-10-CM

## 2018-02-21 DIAGNOSIS — R04.2 HEMOPTYSIS: ICD-10-CM

## 2018-02-21 DIAGNOSIS — R73.9 HYPERGLYCEMIA, UNSPECIFIED: ICD-10-CM

## 2018-02-21 DIAGNOSIS — E84.0 CYSTIC FIBROSIS WITH PULMONARY MANIFESTATIONS: ICD-10-CM

## 2018-02-21 DIAGNOSIS — Z29.9 ENCOUNTER FOR PROPHYLACTIC MEASURES, UNSPECIFIED: ICD-10-CM

## 2018-02-21 LAB
ALBUMIN SERPL ELPH-MCNC: 3.7 G/DL — SIGNIFICANT CHANGE UP (ref 3.3–5)
ALP SERPL-CCNC: 89 U/L — SIGNIFICANT CHANGE UP (ref 40–120)
ALT FLD-CCNC: 15 U/L — SIGNIFICANT CHANGE UP (ref 4–41)
AST SERPL-CCNC: 17 U/L — SIGNIFICANT CHANGE UP (ref 4–40)
B PERT DNA SPEC QL NAA+PROBE: SIGNIFICANT CHANGE UP
BASOPHILS # BLD AUTO: 0.02 K/UL — SIGNIFICANT CHANGE UP (ref 0–0.2)
BASOPHILS NFR BLD AUTO: 0.2 % — SIGNIFICANT CHANGE UP (ref 0–2)
BILIRUB SERPL-MCNC: 0.3 MG/DL — SIGNIFICANT CHANGE UP (ref 0.2–1.2)
BUN SERPL-MCNC: 12 MG/DL — SIGNIFICANT CHANGE UP (ref 7–23)
C PNEUM DNA SPEC QL NAA+PROBE: NOT DETECTED — SIGNIFICANT CHANGE UP
CALCIUM SERPL-MCNC: 8.6 MG/DL — SIGNIFICANT CHANGE UP (ref 8.4–10.5)
CHLORIDE SERPL-SCNC: 97 MMOL/L — LOW (ref 98–107)
CO2 SERPL-SCNC: 30 MMOL/L — SIGNIFICANT CHANGE UP (ref 22–31)
CREAT SERPL-MCNC: 1.05 MG/DL — SIGNIFICANT CHANGE UP (ref 0.5–1.3)
EOSINOPHIL # BLD AUTO: 0.07 K/UL — SIGNIFICANT CHANGE UP (ref 0–0.5)
EOSINOPHIL NFR BLD AUTO: 0.7 % — SIGNIFICANT CHANGE UP (ref 0–6)
FLUAV H1 2009 PAND RNA SPEC QL NAA+PROBE: NOT DETECTED — SIGNIFICANT CHANGE UP
FLUAV H1 RNA SPEC QL NAA+PROBE: NOT DETECTED — SIGNIFICANT CHANGE UP
FLUAV H3 RNA SPEC QL NAA+PROBE: NOT DETECTED — SIGNIFICANT CHANGE UP
FLUAV SUBTYP SPEC NAA+PROBE: SIGNIFICANT CHANGE UP
FLUBV RNA SPEC QL NAA+PROBE: NOT DETECTED — SIGNIFICANT CHANGE UP
GLUCOSE BLDC GLUCOMTR-MCNC: 143 MG/DL — HIGH (ref 70–99)
GLUCOSE BLDC GLUCOMTR-MCNC: 214 MG/DL — HIGH (ref 70–99)
GLUCOSE SERPL-MCNC: 404 MG/DL — HIGH (ref 70–99)
HADV DNA SPEC QL NAA+PROBE: NOT DETECTED — SIGNIFICANT CHANGE UP
HCOV 229E RNA SPEC QL NAA+PROBE: NOT DETECTED — SIGNIFICANT CHANGE UP
HCOV HKU1 RNA SPEC QL NAA+PROBE: NOT DETECTED — SIGNIFICANT CHANGE UP
HCOV NL63 RNA SPEC QL NAA+PROBE: NOT DETECTED — SIGNIFICANT CHANGE UP
HCOV OC43 RNA SPEC QL NAA+PROBE: NOT DETECTED — SIGNIFICANT CHANGE UP
HCT VFR BLD CALC: 40.5 % — SIGNIFICANT CHANGE UP (ref 39–50)
HGB BLD-MCNC: 14 G/DL — SIGNIFICANT CHANGE UP (ref 13–17)
HMPV RNA SPEC QL NAA+PROBE: NOT DETECTED — SIGNIFICANT CHANGE UP
HPIV1 RNA SPEC QL NAA+PROBE: NOT DETECTED — SIGNIFICANT CHANGE UP
HPIV2 RNA SPEC QL NAA+PROBE: NOT DETECTED — SIGNIFICANT CHANGE UP
HPIV3 RNA SPEC QL NAA+PROBE: NOT DETECTED — SIGNIFICANT CHANGE UP
HPIV4 RNA SPEC QL NAA+PROBE: NOT DETECTED — SIGNIFICANT CHANGE UP
IMM GRANULOCYTES # BLD AUTO: 0.04 # — SIGNIFICANT CHANGE UP
IMM GRANULOCYTES NFR BLD AUTO: 0.4 % — SIGNIFICANT CHANGE UP (ref 0–1.5)
LYMPHOCYTES # BLD AUTO: 1.58 K/UL — SIGNIFICANT CHANGE UP (ref 1–3.3)
LYMPHOCYTES # BLD AUTO: 15.1 % — SIGNIFICANT CHANGE UP (ref 13–44)
M PNEUMO DNA SPEC QL NAA+PROBE: NOT DETECTED — SIGNIFICANT CHANGE UP
MCHC RBC-ENTMCNC: 29.2 PG — SIGNIFICANT CHANGE UP (ref 27–34)
MCHC RBC-ENTMCNC: 34.6 % — SIGNIFICANT CHANGE UP (ref 32–36)
MCV RBC AUTO: 84.4 FL — SIGNIFICANT CHANGE UP (ref 80–100)
MONOCYTES # BLD AUTO: 0.63 K/UL — SIGNIFICANT CHANGE UP (ref 0–0.9)
MONOCYTES NFR BLD AUTO: 6 % — SIGNIFICANT CHANGE UP (ref 2–14)
NEUTROPHILS # BLD AUTO: 8.12 K/UL — HIGH (ref 1.8–7.4)
NEUTROPHILS NFR BLD AUTO: 77.6 % — HIGH (ref 43–77)
NRBC # FLD: 0 — SIGNIFICANT CHANGE UP
PLATELET # BLD AUTO: 246 K/UL — SIGNIFICANT CHANGE UP (ref 150–400)
PMV BLD: 9.9 FL — SIGNIFICANT CHANGE UP (ref 7–13)
POTASSIUM SERPL-MCNC: 5.1 MMOL/L — SIGNIFICANT CHANGE UP (ref 3.5–5.3)
POTASSIUM SERPL-SCNC: 5.1 MMOL/L — SIGNIFICANT CHANGE UP (ref 3.5–5.3)
PROT SERPL-MCNC: 7.4 G/DL — SIGNIFICANT CHANGE UP (ref 6–8.3)
RBC # BLD: 4.8 M/UL — SIGNIFICANT CHANGE UP (ref 4.2–5.8)
RBC # FLD: 12.7 % — SIGNIFICANT CHANGE UP (ref 10.3–14.5)
RSV RNA SPEC QL NAA+PROBE: NOT DETECTED — SIGNIFICANT CHANGE UP
RV+EV RNA SPEC QL NAA+PROBE: NOT DETECTED — SIGNIFICANT CHANGE UP
SODIUM SERPL-SCNC: 136 MMOL/L — SIGNIFICANT CHANGE UP (ref 135–145)
WBC # BLD: 10.46 K/UL — SIGNIFICANT CHANGE UP (ref 3.8–10.5)
WBC # FLD AUTO: 10.46 K/UL — SIGNIFICANT CHANGE UP (ref 3.8–10.5)

## 2018-02-21 PROCEDURE — 71046 X-RAY EXAM CHEST 2 VIEWS: CPT | Mod: 26

## 2018-02-21 PROCEDURE — 99222 1ST HOSP IP/OBS MODERATE 55: CPT | Mod: GC

## 2018-02-21 RX ORDER — MEROPENEM 1 G/30ML
2000 INJECTION INTRAVENOUS ONCE
Qty: 0 | Refills: 0 | Status: COMPLETED | OUTPATIENT
Start: 2018-02-21 | End: 2018-02-21

## 2018-02-21 RX ORDER — MEROPENEM 1 G/30ML
INJECTION INTRAVENOUS
Qty: 0 | Refills: 0 | Status: DISCONTINUED | OUTPATIENT
Start: 2018-02-21 | End: 2018-02-26

## 2018-02-21 RX ORDER — INSULIN GLARGINE 100 [IU]/ML
8 INJECTION, SOLUTION SUBCUTANEOUS
Qty: 0 | Refills: 0 | COMMUNITY

## 2018-02-21 RX ORDER — SODIUM CHLORIDE 9 MG/ML
1000 INJECTION, SOLUTION INTRAVENOUS
Qty: 0 | Refills: 0 | Status: DISCONTINUED | OUTPATIENT
Start: 2018-02-21 | End: 2018-02-21

## 2018-02-21 RX ORDER — DEXTROSE 50 % IN WATER 50 %
25 SYRINGE (ML) INTRAVENOUS ONCE
Qty: 0 | Refills: 0 | Status: DISCONTINUED | OUTPATIENT
Start: 2018-02-21 | End: 2018-02-21

## 2018-02-21 RX ORDER — AZTREONAM 2 G
75 VIAL (EA) INJECTION
Qty: 0 | Refills: 0 | COMMUNITY

## 2018-02-21 RX ORDER — FAMOTIDINE 10 MG/ML
20 INJECTION INTRAVENOUS AT BEDTIME
Qty: 0 | Refills: 0 | Status: DISCONTINUED | OUTPATIENT
Start: 2018-02-21 | End: 2018-02-26

## 2018-02-21 RX ORDER — MONTELUKAST 4 MG/1
10 TABLET, CHEWABLE ORAL DAILY
Qty: 0 | Refills: 0 | Status: DISCONTINUED | OUTPATIENT
Start: 2018-02-21 | End: 2018-02-26

## 2018-02-21 RX ORDER — TOBRAMYCIN SULFATE 40 MG/ML
300 VIAL (ML) INJECTION
Qty: 0 | Refills: 0 | COMMUNITY

## 2018-02-21 RX ORDER — DIPHENHYDRAMINE HCL 50 MG
25 CAPSULE ORAL
Qty: 0 | Refills: 0 | Status: DISCONTINUED | OUTPATIENT
Start: 2018-02-21 | End: 2018-02-21

## 2018-02-21 RX ORDER — VANCOMYCIN HCL 1 G
1000 VIAL (EA) INTRAVENOUS EVERY 12 HOURS
Qty: 0 | Refills: 0 | Status: DISCONTINUED | OUTPATIENT
Start: 2018-02-21 | End: 2018-02-22

## 2018-02-21 RX ORDER — MEROPENEM 1 G/30ML
2000 INJECTION INTRAVENOUS EVERY 8 HOURS
Qty: 0 | Refills: 0 | Status: DISCONTINUED | OUTPATIENT
Start: 2018-02-21 | End: 2018-02-26

## 2018-02-21 RX ORDER — DORNASE ALFA 1 MG/ML
1 SOLUTION RESPIRATORY (INHALATION)
Qty: 0 | Refills: 0 | Status: DISCONTINUED | OUTPATIENT
Start: 2018-02-21 | End: 2018-02-21

## 2018-02-21 RX ORDER — INSULIN LISPRO 100/ML
VIAL (ML) SUBCUTANEOUS
Qty: 0 | Refills: 0 | Status: DISCONTINUED | OUTPATIENT
Start: 2018-02-21 | End: 2018-02-22

## 2018-02-21 RX ORDER — DEXTROSE 50 % IN WATER 50 %
12.5 SYRINGE (ML) INTRAVENOUS ONCE
Qty: 0 | Refills: 0 | Status: DISCONTINUED | OUTPATIENT
Start: 2018-02-21 | End: 2018-02-21

## 2018-02-21 RX ORDER — GLUCAGON INJECTION, SOLUTION 0.5 MG/.1ML
1 INJECTION, SOLUTION SUBCUTANEOUS ONCE
Qty: 0 | Refills: 0 | Status: DISCONTINUED | OUTPATIENT
Start: 2018-02-21 | End: 2018-02-21

## 2018-02-21 RX ORDER — FLUTICASONE PROPIONATE AND SALMETEROL 50; 250 UG/1; UG/1
1 POWDER ORAL; RESPIRATORY (INHALATION)
Qty: 0 | Refills: 0 | COMMUNITY

## 2018-02-21 RX ORDER — BUDESONIDE AND FORMOTEROL FUMARATE DIHYDRATE 160; 4.5 UG/1; UG/1
2 AEROSOL RESPIRATORY (INHALATION)
Qty: 0 | Refills: 0 | Status: DISCONTINUED | OUTPATIENT
Start: 2018-02-21 | End: 2018-02-26

## 2018-02-21 RX ORDER — DIPHENHYDRAMINE HCL 50 MG
25 CAPSULE ORAL DAILY
Qty: 0 | Refills: 0 | Status: DISCONTINUED | OUTPATIENT
Start: 2018-02-21 | End: 2018-02-21

## 2018-02-21 RX ORDER — INSULIN LISPRO 100/ML
VIAL (ML) SUBCUTANEOUS AT BEDTIME
Qty: 0 | Refills: 0 | Status: DISCONTINUED | OUTPATIENT
Start: 2018-02-21 | End: 2018-02-22

## 2018-02-21 RX ORDER — DIPHENHYDRAMINE HCL 50 MG
25 CAPSULE ORAL EVERY 12 HOURS
Qty: 0 | Refills: 0 | Status: DISCONTINUED | OUTPATIENT
Start: 2018-02-21 | End: 2018-02-26

## 2018-02-21 RX ORDER — DEXTROSE 50 % IN WATER 50 %
1 SYRINGE (ML) INTRAVENOUS ONCE
Qty: 0 | Refills: 0 | Status: DISCONTINUED | OUTPATIENT
Start: 2018-02-21 | End: 2018-02-26

## 2018-02-21 RX ORDER — ALBUTEROL 90 UG/1
2.5 AEROSOL, METERED ORAL ONCE
Qty: 0 | Refills: 0 | Status: COMPLETED | OUTPATIENT
Start: 2018-02-21 | End: 2018-02-21

## 2018-02-21 RX ADMIN — ALBUTEROL 2.5 MILLIGRAM(S): 90 AEROSOL, METERED ORAL at 15:17

## 2018-02-21 RX ADMIN — MEROPENEM 200 MILLIGRAM(S): 1 INJECTION INTRAVENOUS at 18:15

## 2018-02-21 RX ADMIN — Medication 25 MILLIGRAM(S): at 19:59

## 2018-02-21 RX ADMIN — BUDESONIDE AND FORMOTEROL FUMARATE DIHYDRATE 2 PUFF(S): 160; 4.5 AEROSOL RESPIRATORY (INHALATION) at 21:26

## 2018-02-21 RX ADMIN — FAMOTIDINE 20 MILLIGRAM(S): 10 INJECTION INTRAVENOUS at 21:26

## 2018-02-21 RX ADMIN — MONTELUKAST 10 MILLIGRAM(S): 4 TABLET, CHEWABLE ORAL at 19:59

## 2018-02-21 RX ADMIN — Medication 250 MILLIGRAM(S): at 19:59

## 2018-02-21 NOTE — ED PROVIDER NOTE - ATTENDING CONTRIBUTION TO CARE
agree with resident note  26 yo M PMHx cystic fibrosis p/w hemoptysis.  Had 2 episodes over last 24 hours of coughing up blood.  Denies CP/SOB/palpitations.  Called pulmologist Dr. Wesley who told him to come in for admission.    PE: well appearing; VSS; CTAB/L; s1 s2 no m/r/g abd soft/NT/ND ext: no edema

## 2018-02-21 NOTE — H&P ADULT - HISTORY OF PRESENT ILLNESS
Pt is a 28 yo M with PMHx of CF (last hospitalization in August 2017), recently completed bactrim/rifamin 1 1/2 weeks ago for recent CF exacerbation, p/w hemoptysis x3. First episode was last night and was enough to fill the toilet bowl. Second episode was this morning and third episode was in the ED which was smaller in quantity. Pt endorses increased SOB higher than his baseline, endorses cough that is his usual baseline. Denies recent weight loss, night sweats, fever/chills, CP, palpitations, lightheadedness, pleuritic chest pain, nausea, melena, BRBPR. Pt is a 28 yo M with PMHx of CF (last hospitalization in August 2017), recently completed bactrim/rifamin one and a half weeks ago for recent CF exacerbation, p/w hemoptysis x3. First episode was last night and was enough to fill the toilet bowl. Second episode was this morning and third episode was in the ED which was smaller in quantity. Pt endorses increased SOB higher than his baseline, endorses cough that is his usual baseline. Denies recent weight loss, night sweats, fever/chills, CP, palpitations, lightheadedness, pleuritic chest pain, nausea, melena, BRBPR. Denies any syncopal episodes.     In ED, VS were T98.1 HR65 /71 RR16 SaO2 100% on RA. His labs were significant for a blood glucose of 404 on BMP, but were otherwise unremarkable. CXR showed previously documented lung abnormalities, but no new focal opacities. He was given albuterol x1 and pulmozyme and admitted to the general medical floor for further management of his CF exacerbation.

## 2018-02-21 NOTE — H&P ADULT - PROBLEM SELECTOR PLAN 2
Pt has hx of cystic fibrosis, last hospitalization August 2017. Recently completed course of bactrim and rifampin.   - c/w home meds - budesonide, montelukast  - start vancomycin, meropenem for empiric coverage Pt has hx of cystic fibrosis, last hospitalization August 2017. Recently completed course of bactrim and rifampin.   - treatment of CF exacerbation as above  - c/w home meds - budesonide, montelukast  - saturating well on room air, uptitrate O2 requirements as needed

## 2018-02-21 NOTE — ED PROVIDER NOTE - NS ED ROS FT
GENERAL: No fever or chills, EYES: no change in vision, HEENT: no trouble swallowing or speaking, CARDIAC: no chest pain, PULMONARY: SOB, hemoptysis, GI: no abdominal pain, no nausea, no vomiting, no diarrhea or constipation, : No changes in urination, SKIN: no rashes, NEURO: no headache,  MSK: No joint pain ~Messi Rizvi M.D. Resident

## 2018-02-21 NOTE — ED ADULT NURSE NOTE - OBJECTIVE STATEMENT
Pt a&ox3 c/o sob and productive cough, hx of cystic fibrosis. Pt breathing even and unlabored. Pt denies cp/discomfort, denies headache/dizziness. Abdomen soft, non-tender, non-distended. Skin is cool dry and intact. IVL placed, labs sent. Will continue to monitor.

## 2018-02-21 NOTE — H&P ADULT - PROBLEM SELECTOR PLAN 4
Pt has glucose of 404. Pt was recently dx with DM1 last hospitalization and does not take insulin at home.   - start LIANE, will dose humalog and Lantus   - finger stick glucose   - consistent carbohydrate diet  - diabetes education Pt has glucose of 404. Pt was recently dx with DM1 last hospitalization and does not take insulin at home.   - start ISS, will dose humalog and Lantus tomorrow  - finger stick glucose   - consistent carbohydrate diet  - diabetes education

## 2018-02-21 NOTE — ED PROVIDER NOTE - OBJECTIVE STATEMENT
26 yo M PMHx cystic fibrosis p/w hemoptysis. Last PM he was coughing up bright red blood and filled the toilet bowl. This AM he also had a smaller episode. Denies lightheadedness/dizziness but reports some SOB and chest soreness. He is an established patient of Dr. Wesley and last week finished a course of Bactrim and Rifampin for a CF exacerbation. He denies fevers/chills, rhinorrhea, N/V, abd pain. Denies sick contacts, ETOH, tobacco, or drug use.

## 2018-02-21 NOTE — H&P ADULT - NSHPPHYSICALEXAM_GEN_ALL_CORE
.  VITAL SIGNS:  T(C): 36.7 (02-21-18 @ 13:45), Max: 36.7 (02-21-18 @ 13:45)  T(F): 98 (02-21-18 @ 13:45), Max: 98 (02-21-18 @ 13:45)  HR: 68 (02-21-18 @ 13:45) (68 - 70)  BP: 132/72 (02-21-18 @ 13:45) (121/68 - 132/72)  BP(mean): --  RR: 16 (02-21-18 @ 13:45) (16 - 18)  SpO2: 100% (02-21-18 @ 13:45) (97% - 100%)  Wt(kg): --    PHYSICAL EXAM:    Constitutional: WDWN resting comfortably in bed; NAD  Head: NC/AT  Eyes: PERRLA, EOMI, clear conjunctiva  ENT: no nasal discharge; no oropharyngeal erythema or exudates; moist oral mucosa  Neck: supple; no JVD or thyromegaly  Respiratory: CTA B/L; no W/R/R, no retractions  Cardiac: +S1/S2; RRR; no M/R/G; PMI non-displaced  Gastrointestinal: soft, NT/ND; no rebound or guarding; +BS, no hepatosplenomegaly  Extremities: WWP, + clubbing, no cyanosis; no peripheral edema  Vascular: 2+ radial, DP/PT pulses B/L  Lymphatic: no submandibular or cervical LAD  Neurologic: AAOx3; CNII-XII grossly intact; no focal deficits, motor 5/5 in UE and LE, Reflexes 2+ in UE and LE b/l

## 2018-02-21 NOTE — ED ADULT TRIAGE NOTE - CHIEF COMPLAINT QUOTE
pt with Hx. Cystis Fibrosis coming with prod. cough x few wks now coughing with some blood tinge since yest. denies fever.

## 2018-02-21 NOTE — H&P ADULT - NSHPLABSRESULTS_GEN_ALL_CORE
14.0   10.46 )-----------( 246      ( 21 Feb 2018 13:55 )             40.5       02-21    136  |  97<L>  |  12  ----------------------------<  404<H>  5.1   |  30  |  1.05    Ca    8.6      21 Feb 2018 13:55    TPro  7.4  /  Alb  3.7  /  TBili  0.3  /  DBili  x   /  AST  17  /  ALT  15  /  AlkPhos  89  02-21                      Lactate Trend            CAPILLARY BLOOD GLUCOSE 14.0   10.46 )-----------( 246      ( 21 Feb 2018 13:55 )             40.5       02-21    136  |  97<L>  |  12  ----------------------------<  404<H>  5.1   |  30  |  1.05    Ca    8.6      21 Feb 2018 13:55    TPro  7.4  /  Alb  3.7  /  TBili  0.3  /  DBili  x   /  AST  17  /  ALT  15  /  AlkPhos  89  02-21    < from: Xray Chest 2 Views PA/Lat (02.21.18 @ 13:28) >      INTERPRETATION:  CLINICAL INDICATION: cystic fibrosis; hemoptysis    EXAM:  Frontal and lateral chest from 2/21/2018 at 1328. Compared to prior study   from 8/28/2017.    IMPRESSION:  Previously seen left PICC not present currently.    Redemonstrated coarsely prominent bilateral lung markings with upper lobe   predominance consistent with chronic stigmata of the underlying cystic   fibrosis reflecting combination of bronchiectasis and fibrosis. No new   focal patchy confluent airspace opacities.    No pleural effusions or pneumothorax.    Stable cardiac and mediastinal silhouettes. Heart not grossly enlarged.     Unremarkable osseous structures.          < end of copied text >

## 2018-02-21 NOTE — ED PROVIDER NOTE - PHYSICAL EXAMINATION
Gen: AAOx3, non-toxic  Head: NCAT  HEENT: EOMI, oral mucosa moist, normal conjunctiva  Lung: CTAB, no respiratory distress, no wheezes/rhonchi/rales B/L, speaking in full sentences  CV: RRR, no murmurs, rubs or gallops, +2 radial pulses bilaterally  Abd: soft, NTND, no guarding  MSK: no visible deformities  Neuro: No focal sensory or motor deficits  Skin: Warm, well perfused, no rash  Psych: normal affect.   ~Messi Rizvi M.D. Resident

## 2018-02-21 NOTE — ED PROVIDER NOTE - MEDICAL DECISION MAKING DETAILS
26 yo M PMHx cystic fibrosis p/w hemoptysis, hemodynamically stable, will obtain CXR, basic labs, pulm consult

## 2018-02-21 NOTE — H&P ADULT - NSHPREVIEWOFSYSTEMS_GEN_ALL_CORE
REVIEW OF SYSTEMS:    CONSTITUTIONAL: No weakness, fevers or chills  EYES/ENT: No visual changes;  No vertigo or throat pain   NECK: No pain or stiffness  RESPIRATORY: +hemptysis +mild, baseline cough, +SOB, No wheezing, hemoptysis  CARDIOVASCULAR: No chest pain or palpitations  GASTROINTESTINAL: No abdominal or epigastric pain. No nausea, vomiting, or hematemesis; No diarrhea or constipation. No melena or hematochezia.  GENITOURINARY: No dysuria, frequency or hematuria  NEUROLOGICAL: No numbness or weakness  SKIN: No itching, burning, rashes, or lesions   All other review of systems is negative unless indicated above.

## 2018-02-21 NOTE — H&P ADULT - ASSESSMENT
Pt is a 26 yo M with PMH of CF (last hospitalization in August 2017), recently completed bactrim/rifamin 1 1/2 weeks ago for recent CF exacerbation, p/w hemoptysis x3. Pt is a 26 yo M with PMH of CF (last hospitalization in August 2017), recently completed bactrim/rifamin 1 1/2 weeks ago for recent CF exacerbation, p/w hemoptysis x3 most likely 2/2 recurrent CF exacerbation

## 2018-02-21 NOTE — H&P ADULT - PROBLEM SELECTOR PLAN 1
Pt has three episodes of hemoptysis.   - if hemoptysis continues, consider bronchoscopy   - acid fast sputum with smear   - Pulmonology consult Pt has three episodes of hemoptysis, most likely 2/2 recurrent CF exacerbation that failed outpatient treatment.  - will start meropenem and vancomycin per previous CF exacerbation sensitivies  - if hemoptysis continues, consider diagnostic bronchoscopy   - pulm following, appreciate recs  - acid fast sputum with smear

## 2018-02-22 DIAGNOSIS — E08.65 DIABETES MELLITUS DUE TO UNDERLYING CONDITION WITH HYPERGLYCEMIA: ICD-10-CM

## 2018-02-22 DIAGNOSIS — E84.9 CYSTIC FIBROSIS, UNSPECIFIED: ICD-10-CM

## 2018-02-22 LAB
BUN SERPL-MCNC: 10 MG/DL — SIGNIFICANT CHANGE UP (ref 7–23)
CALCIUM SERPL-MCNC: 8.3 MG/DL — LOW (ref 8.4–10.5)
CHLORIDE SERPL-SCNC: 102 MMOL/L — SIGNIFICANT CHANGE UP (ref 98–107)
CO2 SERPL-SCNC: 27 MMOL/L — SIGNIFICANT CHANGE UP (ref 22–31)
CREAT SERPL-MCNC: 1 MG/DL — SIGNIFICANT CHANGE UP (ref 0.5–1.3)
GLUCOSE BLDC GLUCOMTR-MCNC: 116 MG/DL — HIGH (ref 70–99)
GLUCOSE BLDC GLUCOMTR-MCNC: 138 MG/DL — HIGH (ref 70–99)
GLUCOSE BLDC GLUCOMTR-MCNC: 300 MG/DL — HIGH (ref 70–99)
GLUCOSE BLDC GLUCOMTR-MCNC: 83 MG/DL — SIGNIFICANT CHANGE UP (ref 70–99)
GLUCOSE SERPL-MCNC: 222 MG/DL — HIGH (ref 70–99)
GRAM STN SPT: SIGNIFICANT CHANGE UP
HBA1C BLD-MCNC: 7.2 % — HIGH (ref 4–5.6)
HCT VFR BLD CALC: 39.5 % — SIGNIFICANT CHANGE UP (ref 39–50)
HGB BLD-MCNC: 13.5 G/DL — SIGNIFICANT CHANGE UP (ref 13–17)
MAGNESIUM SERPL-MCNC: 1.9 MG/DL — SIGNIFICANT CHANGE UP (ref 1.6–2.6)
MCHC RBC-ENTMCNC: 29 PG — SIGNIFICANT CHANGE UP (ref 27–34)
MCHC RBC-ENTMCNC: 34.2 % — SIGNIFICANT CHANGE UP (ref 32–36)
MCV RBC AUTO: 84.9 FL — SIGNIFICANT CHANGE UP (ref 80–100)
NRBC # FLD: 0 — SIGNIFICANT CHANGE UP
PHOSPHATE SERPL-MCNC: 3.7 MG/DL — SIGNIFICANT CHANGE UP (ref 2.5–4.5)
PLATELET # BLD AUTO: 226 K/UL — SIGNIFICANT CHANGE UP (ref 150–400)
PMV BLD: 10.3 FL — SIGNIFICANT CHANGE UP (ref 7–13)
POTASSIUM SERPL-MCNC: 4.5 MMOL/L — SIGNIFICANT CHANGE UP (ref 3.5–5.3)
POTASSIUM SERPL-SCNC: 4.5 MMOL/L — SIGNIFICANT CHANGE UP (ref 3.5–5.3)
RBC # BLD: 4.65 M/UL — SIGNIFICANT CHANGE UP (ref 4.2–5.8)
RBC # FLD: 12.9 % — SIGNIFICANT CHANGE UP (ref 10.3–14.5)
SODIUM SERPL-SCNC: 141 MMOL/L — SIGNIFICANT CHANGE UP (ref 135–145)
SPECIMEN SOURCE: SIGNIFICANT CHANGE UP
WBC # BLD: 5.12 K/UL — SIGNIFICANT CHANGE UP (ref 3.8–10.5)
WBC # FLD AUTO: 5.12 K/UL — SIGNIFICANT CHANGE UP (ref 3.8–10.5)

## 2018-02-22 PROCEDURE — 99222 1ST HOSP IP/OBS MODERATE 55: CPT | Mod: GC

## 2018-02-22 PROCEDURE — 99232 SBSQ HOSP IP/OBS MODERATE 35: CPT | Mod: GC

## 2018-02-22 RX ORDER — LIPASE/PROTEASE/AMYLASE 16-48-48K
3 CAPSULE,DELAYED RELEASE (ENTERIC COATED) ORAL
Qty: 0 | Refills: 0 | Status: DISCONTINUED | OUTPATIENT
Start: 2018-02-22 | End: 2018-02-22

## 2018-02-22 RX ORDER — INSULIN LISPRO 100/ML
VIAL (ML) SUBCUTANEOUS AT BEDTIME
Qty: 0 | Refills: 0 | Status: DISCONTINUED | OUTPATIENT
Start: 2018-02-22 | End: 2018-02-26

## 2018-02-22 RX ORDER — VANCOMYCIN HCL 1 G
1000 VIAL (EA) INTRAVENOUS EVERY 12 HOURS
Qty: 0 | Refills: 0 | Status: DISCONTINUED | OUTPATIENT
Start: 2018-02-22 | End: 2018-02-23

## 2018-02-22 RX ORDER — MULTIVIT-MIN/FERROUS GLUCONATE 9 MG/15 ML
1 LIQUID (ML) ORAL
Qty: 0 | Refills: 0 | Status: DISCONTINUED | OUTPATIENT
Start: 2018-02-22 | End: 2018-02-26

## 2018-02-22 RX ORDER — INSULIN LISPRO 100/ML
VIAL (ML) SUBCUTANEOUS
Qty: 0 | Refills: 0 | Status: DISCONTINUED | OUTPATIENT
Start: 2018-02-22 | End: 2018-02-26

## 2018-02-22 RX ORDER — LIPASE/PROTEASE/AMYLASE 16-48-48K
3 CAPSULE,DELAYED RELEASE (ENTERIC COATED) ORAL
Qty: 0 | Refills: 0 | Status: DISCONTINUED | OUTPATIENT
Start: 2018-02-22 | End: 2018-02-26

## 2018-02-22 RX ORDER — ACETAMINOPHEN 500 MG
650 TABLET ORAL EVERY 6 HOURS
Qty: 0 | Refills: 0 | Status: DISCONTINUED | OUTPATIENT
Start: 2018-02-22 | End: 2018-02-26

## 2018-02-22 RX ORDER — LIPASE/PROTEASE/AMYLASE 16-48-48K
6 CAPSULE,DELAYED RELEASE (ENTERIC COATED) ORAL
Qty: 0 | Refills: 0 | Status: DISCONTINUED | OUTPATIENT
Start: 2018-02-22 | End: 2018-02-26

## 2018-02-22 RX ADMIN — MEROPENEM 200 MILLIGRAM(S): 1 INJECTION INTRAVENOUS at 01:33

## 2018-02-22 RX ADMIN — Medication 25 MILLIGRAM(S): at 17:39

## 2018-02-22 RX ADMIN — BUDESONIDE AND FORMOTEROL FUMARATE DIHYDRATE 2 PUFF(S): 160; 4.5 AEROSOL RESPIRATORY (INHALATION) at 08:19

## 2018-02-22 RX ADMIN — BUDESONIDE AND FORMOTEROL FUMARATE DIHYDRATE 2 PUFF(S): 160; 4.5 AEROSOL RESPIRATORY (INHALATION) at 21:39

## 2018-02-22 RX ADMIN — Medication 250 MILLIGRAM(S): at 08:19

## 2018-02-22 RX ADMIN — Medication 6 CAPSULE(S): at 16:40

## 2018-02-22 RX ADMIN — Medication 25 MILLIGRAM(S): at 07:44

## 2018-02-22 RX ADMIN — MONTELUKAST 10 MILLIGRAM(S): 4 TABLET, CHEWABLE ORAL at 11:44

## 2018-02-22 RX ADMIN — FAMOTIDINE 20 MILLIGRAM(S): 10 INJECTION INTRAVENOUS at 21:39

## 2018-02-22 RX ADMIN — Medication 650 MILLIGRAM(S): at 16:39

## 2018-02-22 RX ADMIN — MEROPENEM 200 MILLIGRAM(S): 1 INJECTION INTRAVENOUS at 17:39

## 2018-02-22 RX ADMIN — Medication 6 CAPSULE(S): at 13:10

## 2018-02-22 RX ADMIN — Medication 1: at 22:21

## 2018-02-22 RX ADMIN — Medication 250 MILLIGRAM(S): at 19:36

## 2018-02-22 RX ADMIN — Medication 1 TABLET(S): at 17:39

## 2018-02-22 RX ADMIN — MEROPENEM 200 MILLIGRAM(S): 1 INJECTION INTRAVENOUS at 11:44

## 2018-02-22 NOTE — CONSULT NOTE ADULT - SUBJECTIVE AND OBJECTIVE BOX
HPI:  Pt is a 28 yo M with PMHx of CF (last hospitalization in August 2017), recently completed bactrim/rifamin one and a half weeks ago for recent CF exacerbation, p/w hemoptysis x3. First episode was last night and was enough to fill the toilet bowl. Second episode was this morning and third episode was in the ED which was smaller in quantity. Pt endorses increased SOB higher than his baseline, endorses cough that is his usual baseline. Denies recent weight loss, night sweats, fever/chills, CP, palpitations, lightheadedness, pleuritic chest pain, nausea, melena, BRBPR. Denies any syncopal episodes.     In ED, VS were T98.1 HR65 /71 RR16 SaO2 100% on RA. His labs were significant for a blood glucose of 404 on BMP, but were otherwise unremarkable. CXR showed previously documented lung abnormalities, but no new focal opacities. He was given albuterol x1 and pulmozyme and admitted to the general medical floor for further management of his CF exacerbation. (21 Feb 2018 15:48)    Endocrine history: 27 yr M with CF related DM diagnosed 12 years ago currently admitted for CF exacerbation. Patient states that he follow with Dr Martin (endo). He does not have nay micro or macrovascular complications of DM. Patient states his DM is generally well controlled at home. He was placed on CGM by Dr Martin who did note spikes before meals. His last hospitalization was Aug 2017 during which patient required low dose humalog (approx 2 U before meals). He was discharged and asked to take a sliding scale. When checking he said all his values AM and premeals would be less than 200 so he stopped checking FS. Patient has never been hospitalized for uncontrolled diabetes. Patient has been receiving inhaled steroids during this stay.       PAST MEDICAL & SURGICAL HISTORY:  Cystic fibrosis with pulmonary manifestations  Bronchiectasis  No significant past surgical history      FAMILY HISTORY:  No pertinent family history in first degree relatives      Social History: nonsmoker, nondrinker    Outpatient Medications:  · 	pancrelipase 24,000 units-76,000 units-120,000 units oral delayed release capsule: 6 cap(s) orally 3 times a day (with meals), Last Dose Taken:    · 	montelukast 10 mg oral tablet: 1 tab(s) orally once a day (in the evening), Last Dose Taken:    · 	albuterol 2.5 mg/3 mL (0.083%) inhalation solution: 3 milliliter(s) inhaled every 6 hours, As Needed, Last Dose Taken:    · 	Multiple Vitamins oral tablet: 1 tab(s) orally once a day, Last Dose Taken:    · 	cetirizine 10 mg oral tablet: 1 tab(s) orally once a day, Last Dose Taken:          MEDICATIONS  (STANDING):  amylase/lipase/protease  (CREON 24,000 Units) 6 Capsule(s) Oral three times a day with meals  buDESOnide 160 MICROgram(s)/formoterol 4.5 MICROgram(s) Inhaler 2 Puff(s) Inhalation two times a day  diphenhydrAMINE   Capsule 25 milliGRAM(s) Oral every 12 hours  famotidine    Tablet 20 milliGRAM(s) Oral at bedtime  insulin lispro (HumaLOG) corrective regimen sliding scale   SubCutaneous at bedtime  insulin lispro (HumaLOG) corrective regimen sliding scale   SubCutaneous three times a day before meals  meropenem  IVPB      meropenem  IVPB 2000 milliGRAM(s) IV Intermittent every 8 hours  montelukast 10 milliGRAM(s) Oral daily  multivitamin/mineral Chewable Tablet (AquADEKs) 1 Tablet(s) Chew two times a day  vancomycin  IVPB 1000 milliGRAM(s) IV Intermittent every 12 hours    MEDICATIONS  (PRN):  amylase/lipase/protease  (CREON 24,000 Units) 3 Capsule(s) Oral three times a day with meals PRN snacks  dextrose Gel 1 Dose(s) Oral once PRN Blood Glucose LESS THAN 70 milliGRAM(s)/deciliter      Allergies    No Known Allergies    Intolerances    Benadryl (Flushing (Skin))    Review of Systems:  Constitutional: No fever  Eyes: No blurry vision  Neuro: No tremors  HEENT: No pain  Cardiovascular: No chest pain, palpitations  Respiratory: + SOB, + cough  GI: No nausea, vomiting, abdominal pain  : No dysuria  Skin: no rash  Psych: no depression  Endocrine: no polyuria, polydipsia  Hem/lymph: no swelling  Osteoporosis: no fractures    ALL OTHER SYSTEMS REVIEWED AND NEGATIVE        PHYSICAL EXAM:  VITALS: T(C): 36.6 (02-22-18 @ 05:32)  T(F): 97.9 (02-22-18 @ 05:32), Max: 99 (02-21-18 @ 17:00)  HR: 61 (02-22-18 @ 05:32) (61 - 97)  BP: 118/65 (02-22-18 @ 05:32) (117/70 - 128/71)  RR:  (16 - 19)  SpO2:  (97% - 100%)  Wt(kg): --  GENERAL: NAD, well-groomed, well-developed  EYES: No proptosis, no lid lag, anicteric  HEENT:  Atraumatic, Normocephalic, moist mucous membranes  THYROID: Normal size, no palpable nodules  RESPIRATORY: + B/L crackles  CARDIOVASCULAR: Regular rate and rhythm; No murmurs; no peripheral edema  GI: Soft, nontender, non distended, normal bowel sounds  SKIN: Dry, intact, No rashes or lesions  MUSCULOSKELETAL: Full range of motion, normal strength  NEURO: sensation intact, extraocular movements intact, no tremor, normal reflexes  PSYCH: Alert and oriented x 3, normal affect, normal mood      POCT Blood Glucose.: 83 mg/dL (02-22-18 @ 12:25)  POCT Blood Glucose.: 138 mg/dL (02-22-18 @ 08:17)  POCT Blood Glucose.: 214 mg/dL (02-21-18 @ 21:51)  POCT Blood Glucose.: 143 mg/dL (02-21-18 @ 17:17)                            13.5   5.12  )-----------( 226      ( 22 Feb 2018 06:55 )             39.5       02-22    141  |  102  |  10  ----------------------------<  222<H>  4.5   |  27  |  1.00    EGFR if : 120  EGFR if non : 103    Ca    8.3<L>      02-22  Mg     1.9     02-22  Phos  3.7     02-22    TPro  7.4  /  Alb  3.7  /  TBili  0.3  /  DBili  x   /  AST  17  /  ALT  15  /  AlkPhos  89  02-21          Hemoglobin A1C, Whole Blood: 7.2 % <H> [4.0 - 5.6] (02-22-18 @ 06:55) HPI:  Pt is a 28 yo M with PMHx of CF (last hospitalization in August 2017), recently completed bactrim/rifamin one and a half weeks ago for recent CF exacerbation, p/w hemoptysis x3. First episode was last night and was enough to fill the toilet bowl. Second episode was this morning and third episode was in the ED which was smaller in quantity. Pt endorses increased SOB higher than his baseline, endorses cough that is his usual baseline. Denies recent weight loss, night sweats, fever/chills, CP, palpitations, lightheadedness, pleuritic chest pain, nausea, melena, BRBPR. Denies any syncopal episodes.     In ED, VS were T98.1 HR65 /71 RR16 SaO2 100% on RA. His labs were significant for a blood glucose of 404 on BMP, but were otherwise unremarkable. CXR showed previously documented lung abnormalities, but no new focal opacities. He was given albuterol x1 and pulmozyme and admitted to the general medical floor for further management of his CF exacerbation. (21 Feb 2018 15:48)    Endocrine history: 27 yr M with CF related DM diagnosed 12 years ago currently admitted for CF exacerbation. Patient states that he follow with Dr Martin (endo). He does not have nay micro or macrovascular complications of DM. Patient states his DM is generally well controlled at home. He was placed on CGM by Dr Martin who did note spikes before meals. His last hospitalization was Aug 2017 during which patient required low dose humalog (approx 2 U before meals). He was discharged and asked to take a sliding scale. When checking he said all his values AM and premeals would be less than 200 so he stopped checking FS. Patient has never been hospitalized for uncontrolled diabetes. Patient has been receiving inhaled steroids during this stay.       PAST MEDICAL & SURGICAL HISTORY:  Cystic fibrosis with pulmonary manifestations  Bronchiectasis  No significant past surgical history      FAMILY HISTORY:  No pertinent family history in first degree relatives      Social History: nonsmoker, nondrinker    Outpatient Medications:  · 	pancrelipase 24,000 units-76,000 units-120,000 units oral delayed release capsule: 6 cap(s) orally 3 times a day (with meals), Last Dose Taken:    · 	montelukast 10 mg oral tablet: 1 tab(s) orally once a day (in the evening), Last Dose Taken:    · 	albuterol 2.5 mg/3 mL (0.083%) inhalation solution: 3 milliliter(s) inhaled every 6 hours, As Needed, Last Dose Taken:    · 	Multiple Vitamins oral tablet: 1 tab(s) orally once a day, Last Dose Taken:    · 	cetirizine 10 mg oral tablet: 1 tab(s) orally once a day, Last Dose Taken:          MEDICATIONS  (STANDING):  amylase/lipase/protease  (CREON 24,000 Units) 6 Capsule(s) Oral three times a day with meals  buDESOnide 160 MICROgram(s)/formoterol 4.5 MICROgram(s) Inhaler 2 Puff(s) Inhalation two times a day  diphenhydrAMINE   Capsule 25 milliGRAM(s) Oral every 12 hours  famotidine    Tablet 20 milliGRAM(s) Oral at bedtime  insulin lispro (HumaLOG) corrective regimen sliding scale   SubCutaneous at bedtime  insulin lispro (HumaLOG) corrective regimen sliding scale   SubCutaneous three times a day before meals  meropenem  IVPB      meropenem  IVPB 2000 milliGRAM(s) IV Intermittent every 8 hours  montelukast 10 milliGRAM(s) Oral daily  multivitamin/mineral Chewable Tablet (AquADEKs) 1 Tablet(s) Chew two times a day  vancomycin  IVPB 1000 milliGRAM(s) IV Intermittent every 12 hours    MEDICATIONS  (PRN):  amylase/lipase/protease  (CREON 24,000 Units) 3 Capsule(s) Oral three times a day with meals PRN snacks  dextrose Gel 1 Dose(s) Oral once PRN Blood Glucose LESS THAN 70 milliGRAM(s)/deciliter      Allergies    No Known Allergies    Intolerances    Benadryl (Flushing (Skin))    Review of Systems:  Constitutional: No fever  Eyes: No blurry vision  Neuro: No tremors  HEENT: No pain  Cardiovascular: No chest pain, palpitations  Respiratory: + SOB, + cough  GI: No nausea, vomiting, abdominal pain  : No dysuria  Skin: no rash  Psych: no depression  Endocrine: no polyuria, polydipsia  Hem/lymph: no swelling  Osteoporosis: no fractures    ALL OTHER SYSTEMS REVIEWED AND NEGATIVE        PHYSICAL EXAM:  VITALS: T(C): 36.6 (02-22-18 @ 05:32)  T(F): 97.9 (02-22-18 @ 05:32), Max: 99 (02-21-18 @ 17:00)  HR: 61 (02-22-18 @ 05:32) (61 - 97)  BP: 118/65 (02-22-18 @ 05:32) (117/70 - 128/71)  RR:  (16 - 19)  SpO2:  (97% - 100%)  Wt(kg): --  GENERAL: NAD, well-groomed, well-developed  EYES: No proptosis, no lid lag, anicteric  HEENT:  Atraumatic, Normocephalic, moist mucous membranes  RESPIRATORY: + B/L crackles, no stridor   CARDIOVASCULAR: Regular rate and rhythm; No murmurs; no peripheral edema  GI: Soft, nontender, non distended, normal bowel sounds  SKIN: Dry, intact, No rashes or lesions  MUSCULOSKELETAL: Full range of motion, normal strength  NEURO: sensation intact, extraocular movements intact, no tremor, normal reflexes  PSYCH: Alert and oriented x 3, normal affect, normal mood      POCT Blood Glucose.: 83 mg/dL (02-22-18 @ 12:25)  POCT Blood Glucose.: 138 mg/dL (02-22-18 @ 08:17)  POCT Blood Glucose.: 214 mg/dL (02-21-18 @ 21:51)  POCT Blood Glucose.: 143 mg/dL (02-21-18 @ 17:17)                            13.5   5.12  )-----------( 226      ( 22 Feb 2018 06:55 )             39.5       02-22    141  |  102  |  10  ----------------------------<  222<H>  4.5   |  27  |  1.00    EGFR if : 120  EGFR if non : 103    Ca    8.3<L>      02-22  Mg     1.9     02-22  Phos  3.7     02-22    TPro  7.4  /  Alb  3.7  /  TBili  0.3  /  DBili  x   /  AST  17  /  ALT  15  /  AlkPhos  89  02-21          Hemoglobin A1C, Whole Blood: 7.2 % <H> [4.0 - 5.6] (02-22-18 @ 06:55)

## 2018-02-22 NOTE — DIETITIAN INITIAL EVALUATION ADULT. - NUTRITION INTERVENTION
Meals and Snack/Medical Food Supplements/Vitamin/Nutrition Education/Collaboration and Referral of Nutrition Care

## 2018-02-22 NOTE — CONSULT NOTE ADULT - PROBLEM SELECTOR RECOMMENDATION 9
-While in hospital would continue low dose humalog sliding scale before meals and at bedtime  -Goal glucose 100-180  -If needed may initiate fixed premeal humalog dosing  -Patient can f/u with Dr Martin as outpatient

## 2018-02-22 NOTE — CONSULT NOTE ADULT - PROBLEM SELECTOR PROBLEM 1
Diabetes mellitus due to underlying condition with hyperglycemia, unspecified long term insulin use status

## 2018-02-22 NOTE — PROGRESS NOTE ADULT - SUBJECTIVE AND OBJECTIVE BOX
PROVIDER CONTACT INFO:  MD RAY NovoaJ Pager: [phone]  Long Range Pager: [phone]    SOCORRO STEPHEN  27y  Male      Patient is a 27y old  Male who presents with a chief complaint of Hemoptysis (21 Feb 2018 15:48)      INTERVAL HPI/OVERNIGHT EVENTS: No overnight events    T(C): 36.6 (02-22-18 @ 05:32), Max: 37.2 (02-21-18 @ 17:00)  HR: 61 (02-22-18 @ 05:32) (61 - 97)  BP: 118/65 (02-22-18 @ 05:32) (117/70 - 132/72)  RR: 18 (02-22-18 @ 05:32) (16 - 19)  SpO2: 100% (02-22-18 @ 05:32) (97% - 100%)  Wt(kg): --Vital Signs Last 24 Hrs  T(C): 36.6 (22 Feb 2018 05:32), Max: 37.2 (21 Feb 2018 17:00)  T(F): 97.9 (22 Feb 2018 05:32), Max: 99 (21 Feb 2018 17:00)  HR: 61 (22 Feb 2018 05:32) (61 - 97)  BP: 118/65 (22 Feb 2018 05:32) (117/70 - 132/72)  BP(mean): --  RR: 18 (22 Feb 2018 05:32) (16 - 19)  SpO2: 100% (22 Feb 2018 05:32) (97% - 100%)    PHYSICAL EXAM:  GENERAL: NAD, well-groomed, well-developed  HEAD:  Atraumatic, Normocephalic  EYES: EOMI, PERRLA, conjunctiva and sclera clear  ENMT: No tonsillar erythema, exudates,; Moist mucous membranes. No lesions  NECK: Supple, No JVD, Normal thyroid  CHEST/LUNG: Coarse breath sounds in upper lung fields bilaterally  HEART: Regular rate and rhythm; No murmurs, rubs, or gallops  ABDOMEN: Soft, Nontender, Nondistended; Bowel sounds present.  No hepatosplenomegaly  EXTREMITIES:  2+ Peripheral Pulses, No clubbing, cyanosis, or edema  LYMPH: No lymphadenopathy noted  SKIN: No rashes or lesions  PSYCH: Alert & Oriented x3    Consultant(s) Notes Reviewed:  [x ] YES  [ ] NO  Care Discussed with Consultants/Other Providers [ x] YES  [ ] NO    LABS:                        14.0   10.46 )-----------( 246      ( 21 Feb 2018 13:55 )             40.5     02-21    136  |  97<L>  |  12  ----------------------------<  404<H>  5.1   |  30  |  1.05    Ca    8.6      21 Feb 2018 13:55    TPro  7.4  /  Alb  3.7  /  TBili  0.3  /  DBili  x   /  AST  17  /  ALT  15  /  AlkPhos  89  02-21        CAPILLARY BLOOD GLUCOSE      POCT Blood Glucose.: 214 mg/dL (21 Feb 2018 21:51)  POCT Blood Glucose.: 143 mg/dL (21 Feb 2018 17:17) PROVIDER CONTACT INFO:  MD HERIBERTO Novoa Pager: [phone]  Long Range Pager: [phone]    SOCORRO STEPHEN  27y  Male      Patient is a 27y old  Male who presents with a chief complaint of Hemoptysis (21 Feb 2018 15:48)      INTERVAL HPI/OVERNIGHT EVENTS: No overnight events. This AM pt states feels well, denies F/C/N/V, CP/SOB, abd pain, dysuria, constipation, diarrhea.     T(C): 36.6 (02-22-18 @ 05:32), Max: 37.2 (02-21-18 @ 17:00)  HR: 61 (02-22-18 @ 05:32) (61 - 97)  BP: 118/65 (02-22-18 @ 05:32) (117/70 - 132/72)  RR: 18 (02-22-18 @ 05:32) (16 - 19)  SpO2: 100% (02-22-18 @ 05:32) (97% - 100%)  Wt(kg): --Vital Signs Last 24 Hrs  T(C): 36.6 (22 Feb 2018 05:32), Max: 37.2 (21 Feb 2018 17:00)  T(F): 97.9 (22 Feb 2018 05:32), Max: 99 (21 Feb 2018 17:00)  HR: 61 (22 Feb 2018 05:32) (61 - 97)  BP: 118/65 (22 Feb 2018 05:32) (117/70 - 132/72)  BP(mean): --  RR: 18 (22 Feb 2018 05:32) (16 - 19)  SpO2: 100% (22 Feb 2018 05:32) (97% - 100%)    PHYSICAL EXAM:  GENERAL: NAD, well-groomed, well-developed  HEAD:  Atraumatic, Normocephalic  EYES: EOMI, PERRLA, conjunctiva and sclera clear  ENMT: No tonsillar erythema, exudates,; Moist mucous membranes. No lesions  NECK: Supple, No JVD, Normal thyroid  CHEST/LUNG: Coarse breath sounds in upper lung fields bilaterally  HEART: Regular rate and rhythm; No murmurs, rubs, or gallops  ABDOMEN: Soft, Nontender, Nondistended; Bowel sounds present.  No hepatosplenomegaly  EXTREMITIES:  2+ Peripheral Pulses, No clubbing, cyanosis, or edema  LYMPH: No lymphadenopathy noted  SKIN: No rashes or lesions  PSYCH: Alert & Oriented x3    Consultant(s) Notes Reviewed:  [x ] YES  [ ] NO  Care Discussed with Consultants/Other Providers [ x] YES  [ ] NO    LABS:                        14.0   10.46 )-----------( 246      ( 21 Feb 2018 13:55 )             40.5     02-21    136  |  97<L>  |  12  ----------------------------<  404<H>  5.1   |  30  |  1.05    Ca    8.6      21 Feb 2018 13:55    TPro  7.4  /  Alb  3.7  /  TBili  0.3  /  DBili  x   /  AST  17  /  ALT  15  /  AlkPhos  89  02-21        CAPILLARY BLOOD GLUCOSE      POCT Blood Glucose.: 214 mg/dL (21 Feb 2018 21:51)  POCT Blood Glucose.: 143 mg/dL (21 Feb 2018 17:17)

## 2018-02-22 NOTE — CONSULT NOTE ADULT - ATTENDING COMMENTS
Agree with above.  Pt with CFRD.  Will follow for another day, but as of now, would not escate mgmt over SSI.  If sugars consistently high, pt has done well on 2 units lispro tidac in the past.  F/u outpt with Dr. Martin.

## 2018-02-22 NOTE — DIETITIAN INITIAL EVALUATION ADULT. - NS AS NUTRI INTERV ED CONTENT
Priority modifications/Nutrition relationship to health/disease/Purpose of the nutrition education/Recommended modifications

## 2018-02-22 NOTE — PROGRESS NOTE ADULT - PROBLEM SELECTOR PLAN 5
Diet: consistent carb  DVT ppx: IMPROVE=0, encourage ambulation    Carli Nazario  Pager [phone] Diet: regular with vitamin JERE supplementation  DVT ppx: IMPROVE=0, encourage ambulation    Carli Nazario  Pager [phone]

## 2018-02-22 NOTE — DIETITIAN INITIAL EVALUATION ADULT. - PROBLEM SELECTOR PLAN 1
Pt has three episodes of hemoptysis, most likely 2/2 recurrent CF exacerbation that failed outpatient treatment.  - will start meropenem and vancomycin per previous CF exacerbation sensitivies  - if hemoptysis continues, consider diagnostic bronchoscopy   - pulm following, appreciate recs  - acid fast sputum with smear

## 2018-02-22 NOTE — PROGRESS NOTE ADULT - PROBLEM SELECTOR PLAN 1
Pt has three episodes of hemoptysis prior to presentation, most likely 2/2 recurrent CF exacerbation that failed outpatient treatment.  - c/w meropenem and vancomycin per previous CF exacerbation sensitivities  - if hemoptysis continues, consider diagnostic bronchoscopy   - pulm following, appreciate recs  - acid fast sputum with smear Pt has three episodes of hemoptysis prior to presentation, most likely 2/2 recurrent CF exacerbation that failed outpatient treatment. Has not had any episodes as inpatient.  - c/w meropenem and vancomycin per previous CF exacerbation sensitivities (day 2)  - if hemoptysis continues, consider diagnostic bronchoscopy   - pulm following, appreciate recs  - f/u acid fast sputum with smear Pt has three episodes of hemoptysis prior to presentation, most likely 2/2 recurrent CF exacerbation that failed outpatient treatment. Has not had any episodes as inpatient.  - c/w meropenem and vancomycin per previous CF exacerbation sensitivities (day 2)  - if hemoptysis continues, consider diagnostic bronchoscopy   - pulm following, appreciate recs  - f/u acid fast sputum with smear  - PICC line for long term abx  - chest PT

## 2018-02-22 NOTE — DIETITIAN INITIAL EVALUATION ADULT. - OTHER INFO
Pt. well known to this RDN.  Slept through breakfast, but reports generally good appetite.  NKFA and denies nausea/vomiting/diarrhea/constipation or issues chewing/swallowing.  No significant weight change at this time.  Takes [id number] (6 qac and 3 w snacks PTA).... informed physician and appreciate MD orders.  Pt. to consume Ensure Enlive (chocolate).  Hx of CFRD... although does not self monitor blood glucose or take insulin PTA (which has been addressed by this RDN as outpatient on multiple occasions).  Per pulm fellow, endo consult to be called.  Informed Pt. of HbA1c value (7.2).  Reviewed importance of calorie/protein/nutrient & sodium dense food intake.

## 2018-02-22 NOTE — DIETITIAN INITIAL EVALUATION ADULT. - NS AS NUTRI INTERV COLLABORAT
1)Regular diet, double portions w Ensure Enlive 3x daily                  2)Continue AquADEK 1 PO 2x daily              3)Obtain daily weights          4)Endocrinology consult           5)RDN remains available.  Shasha Welch RDN, CD/N  pager [phone]

## 2018-02-22 NOTE — DIETITIAN INITIAL EVALUATION ADULT. - PROBLEM SELECTOR PLAN 2
Pt has hx of cystic fibrosis, last hospitalization August 2017. Recently completed course of bactrim and rifampin.   - treatment of CF exacerbation as above  - c/w home meds - budesonide, montelukast  - saturating well on room air, uptitrate O2 requirements as needed

## 2018-02-22 NOTE — DIETITIAN INITIAL EVALUATION ADULT. - PROBLEM SELECTOR PLAN 4
Pt has glucose of 404. Pt was recently dx with DM1 last hospitalization and does not take insulin at home.   - start ISS, will dose humalog and Lantus tomorrow  - finger stick glucose   - consistent carbohydrate diet  - diabetes education

## 2018-02-22 NOTE — PROGRESS NOTE ADULT - PROBLEM SELECTOR PLAN 4
Pt has glucose of 404. Pt was recently dx with DM1 last hospitalization and does not take insulin at home.   - start ISS, will dose humalog and Lantus tomorrow  - finger stick glucose   - consistent carbohydrate diet  - diabetes education Pt has glucose of 404. Pt was recently dx with DM1 last hospitalization and does not take insulin at home.   - endo consulted, follow-up recs  - c/w ISS  - finger stick glucose   - diabetes education

## 2018-02-23 LAB
BUN SERPL-MCNC: 11 MG/DL — SIGNIFICANT CHANGE UP (ref 7–23)
CALCIUM SERPL-MCNC: 8.6 MG/DL — SIGNIFICANT CHANGE UP (ref 8.4–10.5)
CHLORIDE SERPL-SCNC: 101 MMOL/L — SIGNIFICANT CHANGE UP (ref 98–107)
CO2 SERPL-SCNC: 27 MMOL/L — SIGNIFICANT CHANGE UP (ref 22–31)
CREAT SERPL-MCNC: 1.07 MG/DL — SIGNIFICANT CHANGE UP (ref 0.5–1.3)
CULTURE - ACID FAST SMEAR CONCENTRATED: SIGNIFICANT CHANGE UP
GLUCOSE BLDC GLUCOMTR-MCNC: 124 MG/DL — HIGH (ref 70–99)
GLUCOSE BLDC GLUCOMTR-MCNC: 130 MG/DL — HIGH (ref 70–99)
GLUCOSE BLDC GLUCOMTR-MCNC: 271 MG/DL — HIGH (ref 70–99)
GLUCOSE BLDC GLUCOMTR-MCNC: 296 MG/DL — HIGH (ref 70–99)
GLUCOSE SERPL-MCNC: 134 MG/DL — HIGH (ref 70–99)
HCT VFR BLD CALC: 41.3 % — SIGNIFICANT CHANGE UP (ref 39–50)
HGB BLD-MCNC: 14.6 G/DL — SIGNIFICANT CHANGE UP (ref 13–17)
MAGNESIUM SERPL-MCNC: 2 MG/DL — SIGNIFICANT CHANGE UP (ref 1.6–2.6)
MCHC RBC-ENTMCNC: 30.2 PG — SIGNIFICANT CHANGE UP (ref 27–34)
MCHC RBC-ENTMCNC: 35.4 % — SIGNIFICANT CHANGE UP (ref 32–36)
MCV RBC AUTO: 85.5 FL — SIGNIFICANT CHANGE UP (ref 80–100)
NRBC # FLD: 0 — SIGNIFICANT CHANGE UP
PHOSPHATE SERPL-MCNC: 3.1 MG/DL — SIGNIFICANT CHANGE UP (ref 2.5–4.5)
PLATELET # BLD AUTO: 227 K/UL — SIGNIFICANT CHANGE UP (ref 150–400)
PMV BLD: 9.9 FL — SIGNIFICANT CHANGE UP (ref 7–13)
POTASSIUM SERPL-MCNC: 4.7 MMOL/L — SIGNIFICANT CHANGE UP (ref 3.5–5.3)
POTASSIUM SERPL-SCNC: 4.7 MMOL/L — SIGNIFICANT CHANGE UP (ref 3.5–5.3)
RBC # BLD: 4.83 M/UL — SIGNIFICANT CHANGE UP (ref 4.2–5.8)
RBC # FLD: 12.6 % — SIGNIFICANT CHANGE UP (ref 10.3–14.5)
SODIUM SERPL-SCNC: 139 MMOL/L — SIGNIFICANT CHANGE UP (ref 135–145)
SPECIMEN SOURCE: SIGNIFICANT CHANGE UP
VANCOMYCIN TROUGH SERPL-MCNC: 5 UG/ML — LOW (ref 10–20)
WBC # BLD: 5.54 K/UL — SIGNIFICANT CHANGE UP (ref 3.8–10.5)
WBC # FLD AUTO: 5.54 K/UL — SIGNIFICANT CHANGE UP (ref 3.8–10.5)

## 2018-02-23 PROCEDURE — 99232 SBSQ HOSP IP/OBS MODERATE 35: CPT | Mod: GC

## 2018-02-23 PROCEDURE — 99232 SBSQ HOSP IP/OBS MODERATE 35: CPT

## 2018-02-23 RX ORDER — INSULIN LISPRO 100/ML
2 VIAL (ML) SUBCUTANEOUS
Qty: 0 | Refills: 0 | Status: DISCONTINUED | OUTPATIENT
Start: 2018-02-23 | End: 2018-02-26

## 2018-02-23 RX ORDER — VANCOMYCIN HCL 1 G
1250 VIAL (EA) INTRAVENOUS EVERY 12 HOURS
Qty: 0 | Refills: 0 | Status: DISCONTINUED | OUTPATIENT
Start: 2018-02-23 | End: 2018-02-26

## 2018-02-23 RX ADMIN — MEROPENEM 200 MILLIGRAM(S): 1 INJECTION INTRAVENOUS at 17:24

## 2018-02-23 RX ADMIN — FAMOTIDINE 20 MILLIGRAM(S): 10 INJECTION INTRAVENOUS at 22:39

## 2018-02-23 RX ADMIN — BUDESONIDE AND FORMOTEROL FUMARATE DIHYDRATE 2 PUFF(S): 160; 4.5 AEROSOL RESPIRATORY (INHALATION) at 09:03

## 2018-02-23 RX ADMIN — Medication 2 UNIT(S): at 19:37

## 2018-02-23 RX ADMIN — Medication 3: at 11:56

## 2018-02-23 RX ADMIN — Medication 1 TABLET(S): at 09:02

## 2018-02-23 RX ADMIN — Medication 6 CAPSULE(S): at 11:52

## 2018-02-23 RX ADMIN — Medication 25 MILLIGRAM(S): at 11:09

## 2018-02-23 RX ADMIN — Medication 166.67 MILLIGRAM(S): at 23:36

## 2018-02-23 RX ADMIN — Medication 25 MILLIGRAM(S): at 22:39

## 2018-02-23 RX ADMIN — Medication 1: at 22:39

## 2018-02-23 RX ADMIN — Medication 166.67 MILLIGRAM(S): at 11:52

## 2018-02-23 RX ADMIN — MONTELUKAST 10 MILLIGRAM(S): 4 TABLET, CHEWABLE ORAL at 11:10

## 2018-02-23 RX ADMIN — MEROPENEM 200 MILLIGRAM(S): 1 INJECTION INTRAVENOUS at 02:21

## 2018-02-23 RX ADMIN — MEROPENEM 200 MILLIGRAM(S): 1 INJECTION INTRAVENOUS at 10:11

## 2018-02-23 RX ADMIN — Medication 6 CAPSULE(S): at 09:03

## 2018-02-23 RX ADMIN — BUDESONIDE AND FORMOTEROL FUMARATE DIHYDRATE 2 PUFF(S): 160; 4.5 AEROSOL RESPIRATORY (INHALATION) at 22:40

## 2018-02-23 RX ADMIN — Medication 6 CAPSULE(S): at 19:37

## 2018-02-23 RX ADMIN — Medication 1 TABLET(S): at 17:24

## 2018-02-23 NOTE — PROGRESS NOTE ADULT - PROBLEM SELECTOR PLAN 4
Pt has glucose of 404. Pt was recently dx with DM1 last hospitalization and does not take insulin at home.   - endo consulted, follow-up recs  - c/w ISS  - finger stick glucose   - diabetes education

## 2018-02-23 NOTE — PROGRESS NOTE ADULT - PROBLEM SELECTOR PLAN 1
Pt has three episodes of hemoptysis prior to presentation, most likely 2/2 recurrent CF exacerbation that failed outpatient treatment. Has not had any episodes as inpatient.  - c/w meropenem and vancomycin per previous CF exacerbation sensitivities (day 3)  - if hemoptysis continues, consider diagnostic bronchoscopy   - pulm following, appreciate recs  - f/u acid fast sputum with smear  - PICC line for long term abx  - chest PT Pt has three episodes of hemoptysis prior to presentation, most likely 2/2 recurrent CF exacerbation that failed outpatient treatment. Has not had any episodes as inpatient.  - c/w meropenem and vancomycin per previous CF exacerbation sensitivities (day 3). Vanc trough 5 this AM, will increase dose to 1250mg Q12H  - if hemoptysis continues, consider diagnostic bronchoscopy   - pulm following, appreciate recs  - f/u acid fast sputum with smear  - PICC line for long term abx pending  - chest PT

## 2018-02-23 NOTE — PROGRESS NOTE ADULT - SUBJECTIVE AND OBJECTIVE BOX
Chief Complaint: CFRD    Interval history: Pt feeling well, not on O2 during my encounter.  Eating well.  Ate a lot at dinner/bedtime last night he says and sugar spiked to 300s, then declined by AM with CDI.  He seems to have trend of having a spike daily between dinner and bedtime.     MEDICATIONS  (STANDING):  amylase/lipase/protease  (CREON 24,000 Units) 6 Capsule(s) Oral three times a day with meals  buDESOnide 160 MICROgram(s)/formoterol 4.5 MICROgram(s) Inhaler 2 Puff(s) Inhalation two times a day  diphenhydrAMINE   Capsule 25 milliGRAM(s) Oral every 12 hours  famotidine    Tablet 20 milliGRAM(s) Oral at bedtime  insulin lispro (HumaLOG) corrective regimen sliding scale   SubCutaneous three times a day before meals  insulin lispro (HumaLOG) corrective regimen sliding scale   SubCutaneous at bedtime  meropenem  IVPB      meropenem  IVPB 2000 milliGRAM(s) IV Intermittent every 8 hours  montelukast 10 milliGRAM(s) Oral daily  multivitamin/mineral Chewable Tablet (AquADEKs) 1 Tablet(s) Chew two times a day  vancomycin  IVPB 1250 milliGRAM(s) IV Intermittent every 12 hours    MEDICATIONS  (PRN):  acetaminophen   Tablet 650 milliGRAM(s) Oral every 6 hours PRN For Temp greater than 38 C (100.4 F)  amylase/lipase/protease  (CREON 24,000 Units) 3 Capsule(s) Oral three times a day with meals PRN snacks  dextrose Gel 1 Dose(s) Oral once PRN Blood Glucose LESS THAN 70 milliGRAM(s)/deciliter      Allergies    No Known Allergies    Intolerances    Benadryl (Flushing (Skin))    Review of Systems:  Skin: no rash  Endocrine: no polyuria, polydipsia    ALL OTHER SYSTEMS REVIEWED AND NEGATIVE    PHYSICAL EXAM:  VITALS: T(C): 36.7 (02-23-18 @ 07:34)  T(F): 98 (02-23-18 @ 07:34), Max: 100.9 (02-22-18 @ 15:27)  HR: 81 (02-23-18 @ 07:34) (78 - 92)  BP: 116/62 (02-23-18 @ 07:34) (116/62 - 131/68)  RR:  (18 - 24)  SpO2:  (96% - 98%)  Wt(kg): --  GENERAL: NAD, well-developed  EYES: No proptosis, sclera anicteric  HEENT:  Atraumatic, Normocephalic, moist mucous membranes  SKIN: Dry, intact, No diffuse rashes   PSYCH: Alert and oriented x 3, normal affect, normal mood    POCT Blood Glucose.: 296 mg/dL (02-23-18 @ 11:55)  POCT Blood Glucose.: 124 mg/dL (02-23-18 @ 08:30)  POCT Blood Glucose.: 300 mg/dL (02-22-18 @ 22:17)  POCT Blood Glucose.: 116 mg/dL (02-22-18 @ 16:38)  POCT Blood Glucose.: 83 mg/dL (02-22-18 @ 12:25)  POCT Blood Glucose.: 138 mg/dL (02-22-18 @ 08:17)  POCT Blood Glucose.: 214 mg/dL (02-21-18 @ 21:51)  POCT Blood Glucose.: 143 mg/dL (02-21-18 @ 17:17)      02-23    139  |  101  |  11  ----------------------------<  134<H>  4.7   |  27  |  1.07    EGFR if : 111  EGFR if non : 95    Ca    8.6      02-23  Mg     2.0     02-23  Phos  3.1     02-23    TPro  7.4  /  Alb  3.7  /  TBili  0.3  /  DBili  x   /  AST  17  /  ALT  15  /  AlkPhos  89  02-21          Thyroid Function Tests:      Hemoglobin A1C, Whole Blood: 7.2 % <H> [4.0 - 5.6] (02-22-18 @ 06:55)

## 2018-02-23 NOTE — PROGRESS NOTE ADULT - PROBLEM SELECTOR PLAN 1
While in hospital would continue low dose humalog sliding scale before meals and at bedtime  -Goal glucose 100-180  -will add a dose of 2 units of humalog at dinner night as he seems to spike daily between dinner and bedtime (and confirmed with pt that this is when he eats the most each day).  Would hold this dose if pt NPO or not eating dinner.   -Patient can f/u with Dr Martin as outpatient. While in hospital would continue low dose humalog sliding scale before meals and at bedtime  -Goal glucose 100-180  -will add a dose of 2 units of humalog at dinner night as he seems to spike daily between dinner and bedtime (and confirmed with pt that this is when he eats the most each day).  Would hold this dose if pt NPO or not eating dinner.   -if team notices over weekend that he is spiking also at lunch and/or dinner, would add 2 unit humalog dose as well for breakfast and/or lunch respectively depending on time of spikes to prevent them from happening   -Patient can f/u with Dr Martin as outpatient.

## 2018-02-23 NOTE — PROGRESS NOTE ADULT - PROBLEM SELECTOR PLAN 5
Diet: regular with vitamin JERE supplementation  DVT ppx: IMPROVE=0, encourage ambulation    Carli Nazario  Pager [phone]

## 2018-02-23 NOTE — PROGRESS NOTE ADULT - SUBJECTIVE AND OBJECTIVE BOX
PROVIDER CONTACT INFO:  MD HERIBERTO Novoa Pager: [phone]  Long Range Pager: [phone]    SOCORRO STEPHEN  27y  Male      Patient is a 27y old  Male who presents with a chief complaint of Hemoptysis (21 Feb 2018 15:48)      INTERVAL HPI/OVERNIGHT EVENTS: No overnight events.     T(C): 36.9 (02-22-18 @ 22:22), Max: 38.3 (02-22-18 @ 15:27)  HR: 78 (02-22-18 @ 22:22) (78 - 92)  BP: 129/60 (02-22-18 @ 22:22) (129/60 - 131/68)  RR: 18 (02-22-18 @ 22:22) (18 - 24)  SpO2: 97% (02-22-18 @ 22:22) (96% - 97%)  Wt(kg): --Vital Signs Last 24 Hrs  T(C): 36.9 (22 Feb 2018 22:22), Max: 38.3 (22 Feb 2018 15:27)  T(F): 98.5 (22 Feb 2018 22:22), Max: 100.9 (22 Feb 2018 15:27)  HR: 78 (22 Feb 2018 22:22) (78 - 92)  BP: 129/60 (22 Feb 2018 22:22) (129/60 - 131/68)  BP(mean): --  RR: 18 (22 Feb 2018 22:22) (18 - 24)  SpO2: 97% (22 Feb 2018 22:22) (96% - 97%)    PHYSICAL EXAM:  GENERAL: NAD, well-groomed, well-developed  HEAD:  Atraumatic, Normocephalic  EYES: EOMI, PERRLA, conjunctiva and sclera clear  ENMT: No tonsillar erythema, exudates,; Moist mucous membranes. No lesions  NECK: Supple, No JVD, Normal thyroid  CHEST/LUNG: Coarse breath sounds heard in all lung fields  HEART: Regular rate and rhythm; No murmurs, rubs, or gallops  ABDOMEN: Soft, Nontender, Nondistended; Bowel sounds present.  No hepatosplenomegaly  EXTREMITIES:  2+ Peripheral Pulses, No clubbing, cyanosis, or edema  LYMPH: No lymphadenopathy noted  SKIN: No rashes or lesions  PSYCH: Alert & Oriented x3  NERVOUS SYSTEM:  ; Motor Strength 5/5 B/L upper and lower extremities.  Sensory intact    Consultant(s) Notes Reviewed:  [x ] YES  [ ] NO  Care Discussed with Consultants/Other Providers [ x] YES  [ ] NO    LABS:                        13.5   5.12  )-----------( 226      ( 22 Feb 2018 06:55 )             39.5     02-22    141  |  102  |  10  ----------------------------<  222<H>  4.5   |  27  |  1.00    Ca    8.3<L>      22 Feb 2018 06:55  Phos  3.7     02-22  Mg     1.9     02-22    TPro  7.4  /  Alb  3.7  /  TBili  0.3  /  DBili  x   /  AST  17  /  ALT  15  /  AlkPhos  89  02-21        CAPILLARY BLOOD GLUCOSE      POCT Blood Glucose.: 300 mg/dL (22 Feb 2018 22:17)  POCT Blood Glucose.: 116 mg/dL (22 Feb 2018 16:38)  POCT Blood Glucose.: 83 mg/dL (22 Feb 2018 12:25)  POCT Blood Glucose.: 138 mg/dL (22 Feb 2018 08:17) PROVIDER CONTACT INFO:  MD HERIBERTO Novoa Pager: [phone]  Long Range Pager: [phone]    SOCORRO STEPHEN  27y  Male      Patient is a 27y old  Male who presents with a chief complaint of Hemoptysis (21 Feb 2018 15:48)      INTERVAL HPI/OVERNIGHT EVENTS: Pt spiked fever to 100.9 yesterday, today he states he feels well. Denies F/C/N/V, CP/SOB, abd pain, dysuria, constipation/diarrhea    T(C): 36.9 (02-22-18 @ 22:22), Max: 38.3 (02-22-18 @ 15:27)  HR: 78 (02-22-18 @ 22:22) (78 - 92)  BP: 129/60 (02-22-18 @ 22:22) (129/60 - 131/68)  RR: 18 (02-22-18 @ 22:22) (18 - 24)  SpO2: 97% (02-22-18 @ 22:22) (96% - 97%)  Wt(kg): --Vital Signs Last 24 Hrs  T(C): 36.9 (22 Feb 2018 22:22), Max: 38.3 (22 Feb 2018 15:27)  T(F): 98.5 (22 Feb 2018 22:22), Max: 100.9 (22 Feb 2018 15:27)  HR: 78 (22 Feb 2018 22:22) (78 - 92)  BP: 129/60 (22 Feb 2018 22:22) (129/60 - 131/68)  BP(mean): --  RR: 18 (22 Feb 2018 22:22) (18 - 24)  SpO2: 97% (22 Feb 2018 22:22) (96% - 97%)    PHYSICAL EXAM:  GENERAL: NAD  HEAD:  Atraumatic, Normocephalic  EYES: EOMI, PERRLA, conjunctiva and sclera clear  ENMT: No tonsillar erythema, exudates,; Moist mucous membranes. No lesions  NECK: Supple, No JVD, Normal thyroid  CHEST/LUNG: Coarse breath sounds heard in all lung fields  HEART: Regular rate and rhythm; No murmurs, rubs, or gallops  ABDOMEN: Soft, Nontender, Nondistended; Bowel sounds present.  No hepatosplenomegaly  EXTREMITIES:  2+ Peripheral Pulses, No clubbing, cyanosis, or edema  LYMPH: No lymphadenopathy noted  SKIN: No rashes or lesions  PSYCH: Alert & Oriented x3  NERVOUS SYSTEM:  ; Motor Strength 5/5 B/L upper and lower extremities.  Sensory intact    Consultant(s) Notes Reviewed:  [x ] YES  [ ] NO  Care Discussed with Consultants/Other Providers [ x] YES  [ ] NO    LABS:                        13.5   5.12  )-----------( 226      ( 22 Feb 2018 06:55 )             39.5     02-22    141  |  102  |  10  ----------------------------<  222<H>  4.5   |  27  |  1.00    Ca    8.3<L>      22 Feb 2018 06:55  Phos  3.7     02-22  Mg     1.9     02-22    TPro  7.4  /  Alb  3.7  /  TBili  0.3  /  DBili  x   /  AST  17  /  ALT  15  /  AlkPhos  89  02-21        CAPILLARY BLOOD GLUCOSE      POCT Blood Glucose.: 300 mg/dL (22 Feb 2018 22:17)  POCT Blood Glucose.: 116 mg/dL (22 Feb 2018 16:38)  POCT Blood Glucose.: 83 mg/dL (22 Feb 2018 12:25)  POCT Blood Glucose.: 138 mg/dL (22 Feb 2018 08:17)

## 2018-02-24 LAB
APPEARANCE UR: CLEAR — SIGNIFICANT CHANGE UP
BACTERIA # UR AUTO: SIGNIFICANT CHANGE UP
BILIRUB UR-MCNC: NEGATIVE — SIGNIFICANT CHANGE UP
BLOOD UR QL VISUAL: NEGATIVE — SIGNIFICANT CHANGE UP
BUN SERPL-MCNC: 15 MG/DL — SIGNIFICANT CHANGE UP (ref 7–23)
CALCIUM SERPL-MCNC: 8.6 MG/DL — SIGNIFICANT CHANGE UP (ref 8.4–10.5)
CHLORIDE SERPL-SCNC: 101 MMOL/L — SIGNIFICANT CHANGE UP (ref 98–107)
CO2 SERPL-SCNC: 29 MMOL/L — SIGNIFICANT CHANGE UP (ref 22–31)
COD CRY URNS QL: SIGNIFICANT CHANGE UP (ref 0–0)
COLOR SPEC: YELLOW — SIGNIFICANT CHANGE UP
CREAT SERPL-MCNC: 0.94 MG/DL — SIGNIFICANT CHANGE UP (ref 0.5–1.3)
GLUCOSE BLDC GLUCOMTR-MCNC: 124 MG/DL — HIGH (ref 70–99)
GLUCOSE BLDC GLUCOMTR-MCNC: 143 MG/DL — HIGH (ref 70–99)
GLUCOSE BLDC GLUCOMTR-MCNC: 234 MG/DL — HIGH (ref 70–99)
GLUCOSE BLDC GLUCOMTR-MCNC: 239 MG/DL — HIGH (ref 70–99)
GLUCOSE SERPL-MCNC: 130 MG/DL — HIGH (ref 70–99)
GLUCOSE UR-MCNC: 50 — HIGH
HCT VFR BLD CALC: 40.8 % — SIGNIFICANT CHANGE UP (ref 39–50)
HGB BLD-MCNC: 14.5 G/DL — SIGNIFICANT CHANGE UP (ref 13–17)
KETONES UR-MCNC: HIGH
LEUKOCYTE ESTERASE UR-ACNC: NEGATIVE — SIGNIFICANT CHANGE UP
MAGNESIUM SERPL-MCNC: 2.2 MG/DL — SIGNIFICANT CHANGE UP (ref 1.6–2.6)
MCHC RBC-ENTMCNC: 30.5 PG — SIGNIFICANT CHANGE UP (ref 27–34)
MCHC RBC-ENTMCNC: 35.5 % — SIGNIFICANT CHANGE UP (ref 32–36)
MCV RBC AUTO: 85.9 FL — SIGNIFICANT CHANGE UP (ref 80–100)
MUCOUS THREADS # UR AUTO: SIGNIFICANT CHANGE UP
NITRITE UR-MCNC: NEGATIVE — SIGNIFICANT CHANGE UP
NRBC # FLD: 0 — SIGNIFICANT CHANGE UP
PH UR: 6 — SIGNIFICANT CHANGE UP (ref 4.6–8)
PHOSPHATE SERPL-MCNC: 3.5 MG/DL — SIGNIFICANT CHANGE UP (ref 2.5–4.5)
PLATELET # BLD AUTO: 233 K/UL — SIGNIFICANT CHANGE UP (ref 150–400)
PMV BLD: 9.9 FL — SIGNIFICANT CHANGE UP (ref 7–13)
POTASSIUM SERPL-MCNC: 4.6 MMOL/L — SIGNIFICANT CHANGE UP (ref 3.5–5.3)
POTASSIUM SERPL-SCNC: 4.6 MMOL/L — SIGNIFICANT CHANGE UP (ref 3.5–5.3)
PROT UR-MCNC: 20 MG/DL — SIGNIFICANT CHANGE UP
RBC # BLD: 4.75 M/UL — SIGNIFICANT CHANGE UP (ref 4.2–5.8)
RBC # FLD: 12.8 % — SIGNIFICANT CHANGE UP (ref 10.3–14.5)
RBC CASTS # UR COMP ASSIST: SIGNIFICANT CHANGE UP (ref 0–?)
SODIUM SERPL-SCNC: 140 MMOL/L — SIGNIFICANT CHANGE UP (ref 135–145)
SP GR SPEC: 1.03 — SIGNIFICANT CHANGE UP (ref 1–1.04)
SQUAMOUS # UR AUTO: SIGNIFICANT CHANGE UP
UROBILINOGEN FLD QL: 2 MG/DL — HIGH
VANCOMYCIN TROUGH SERPL-MCNC: 11.2 UG/ML — SIGNIFICANT CHANGE UP (ref 10–20)
WBC # BLD: 5.32 K/UL — SIGNIFICANT CHANGE UP (ref 3.8–10.5)
WBC # FLD AUTO: 5.32 K/UL — SIGNIFICANT CHANGE UP (ref 3.8–10.5)
WBC UR QL: SIGNIFICANT CHANGE UP (ref 0–?)

## 2018-02-24 PROCEDURE — 99232 SBSQ HOSP IP/OBS MODERATE 35: CPT

## 2018-02-24 RX ORDER — LANOLIN ALCOHOL/MO/W.PET/CERES
3 CREAM (GRAM) TOPICAL AT BEDTIME
Qty: 0 | Refills: 0 | Status: DISCONTINUED | OUTPATIENT
Start: 2018-02-24 | End: 2018-02-26

## 2018-02-24 RX ORDER — DORNASE ALFA 1 MG/ML
2.5 SOLUTION RESPIRATORY (INHALATION) DAILY
Qty: 0 | Refills: 0 | Status: DISCONTINUED | OUTPATIENT
Start: 2018-02-24 | End: 2018-02-26

## 2018-02-24 RX ORDER — LANOLIN ALCOHOL/MO/W.PET/CERES
3 CREAM (GRAM) TOPICAL ONCE
Qty: 0 | Refills: 0 | Status: COMPLETED | OUTPATIENT
Start: 2018-02-24 | End: 2018-02-24

## 2018-02-24 RX ORDER — LANOLIN ALCOHOL/MO/W.PET/CERES
3 CREAM (GRAM) TOPICAL AT BEDTIME
Qty: 0 | Refills: 0 | Status: DISCONTINUED | OUTPATIENT
Start: 2018-02-24 | End: 2018-02-24

## 2018-02-24 RX ORDER — ACETAMINOPHEN 500 MG
650 TABLET ORAL EVERY 6 HOURS
Qty: 0 | Refills: 0 | Status: DISCONTINUED | OUTPATIENT
Start: 2018-02-24 | End: 2018-02-26

## 2018-02-24 RX ADMIN — BUDESONIDE AND FORMOTEROL FUMARATE DIHYDRATE 2 PUFF(S): 160; 4.5 AEROSOL RESPIRATORY (INHALATION) at 08:47

## 2018-02-24 RX ADMIN — BUDESONIDE AND FORMOTEROL FUMARATE DIHYDRATE 2 PUFF(S): 160; 4.5 AEROSOL RESPIRATORY (INHALATION) at 22:53

## 2018-02-24 RX ADMIN — FAMOTIDINE 20 MILLIGRAM(S): 10 INJECTION INTRAVENOUS at 22:52

## 2018-02-24 RX ADMIN — MEROPENEM 200 MILLIGRAM(S): 1 INJECTION INTRAVENOUS at 18:52

## 2018-02-24 RX ADMIN — Medication 650 MILLIGRAM(S): at 17:00

## 2018-02-24 RX ADMIN — Medication 1 TABLET(S): at 06:32

## 2018-02-24 RX ADMIN — Medication 25 MILLIGRAM(S): at 12:01

## 2018-02-24 RX ADMIN — Medication 6 CAPSULE(S): at 08:47

## 2018-02-24 RX ADMIN — Medication 650 MILLIGRAM(S): at 16:17

## 2018-02-24 RX ADMIN — Medication 2 UNIT(S): at 17:38

## 2018-02-24 RX ADMIN — Medication 3 MILLIGRAM(S): at 02:06

## 2018-02-24 RX ADMIN — Medication 6 CAPSULE(S): at 17:38

## 2018-02-24 RX ADMIN — Medication 166.67 MILLIGRAM(S): at 10:38

## 2018-02-24 RX ADMIN — MONTELUKAST 10 MILLIGRAM(S): 4 TABLET, CHEWABLE ORAL at 12:01

## 2018-02-24 RX ADMIN — Medication 6 CAPSULE(S): at 12:02

## 2018-02-24 RX ADMIN — MEROPENEM 200 MILLIGRAM(S): 1 INJECTION INTRAVENOUS at 12:04

## 2018-02-24 RX ADMIN — MEROPENEM 200 MILLIGRAM(S): 1 INJECTION INTRAVENOUS at 02:06

## 2018-02-24 RX ADMIN — Medication 2: at 17:39

## 2018-02-24 RX ADMIN — Medication 1 TABLET(S): at 17:40

## 2018-02-24 RX ADMIN — DORNASE ALFA 2.5 MILLIGRAM(S): 1 SOLUTION RESPIRATORY (INHALATION) at 13:25

## 2018-02-24 NOTE — PROGRESS NOTE ADULT - PROBLEM SELECTOR PLAN 4
Pt has glucose of 404. Pt was recently dx with DM1 last hospitalization and does not take insulin at home.   - endocrinology started dinnertime humalog 2U as well as HISS  - a little better controlled today, will trend FS today and consider addition breakfast +/- lunch tomorrow  - diabetes education

## 2018-02-24 NOTE — PROGRESS NOTE ADULT - PROBLEM SELECTOR PLAN 1
Pt has three episodes of hemoptysis prior to presentation, most likely 2/2 recurrent CF exacerbation that failed outpatient treatment. Has not had any episodes as inpatient.  - c/w meropenem and vancomycin per previous CF exacerbation sensitivities (day 4). Vanc dose increased yesterday to 1250mg Q12H  - if hemoptysis continues, consider diagnostic bronchoscopy   - pulm following, appreciate recs  - f/u acid fast sputum with smear  - PICC line for long term abx pending  - chest PT  --will discuss with pulm whether pulmozyme and hypersal is needed.

## 2018-02-24 NOTE — PROGRESS NOTE ADULT - SUBJECTIVE AND OBJECTIVE BOX
T4 Coverage  Janine Sarkar MD PGY3  Call [employer] [phone] today, afternoon covered by night float pagers.    Patient is a 27y old  Male who presents with a chief complaint of Hemoptysis (21 Feb 2018 15:48)    SUBJECTIVE / OVERNIGHT EVENTS:  No hemoptysis or SOB overnight. Feels well overall, a little tired. Unable to get PICC line placed yesterday due to IR being backed up.     MEDICATIONS  (STANDING):  amylase/lipase/protease  (CREON 24,000 Units) 6 Capsule(s) Oral three times a day with meals  buDESOnide 160 MICROgram(s)/formoterol 4.5 MICROgram(s) Inhaler 2 Puff(s) Inhalation two times a day  diphenhydrAMINE   Capsule 25 milliGRAM(s) Oral every 12 hours  famotidine    Tablet 20 milliGRAM(s) Oral at bedtime  insulin lispro (HumaLOG) corrective regimen sliding scale   SubCutaneous three times a day before meals  insulin lispro (HumaLOG) corrective regimen sliding scale   SubCutaneous at bedtime  insulin lispro Injectable (HumaLOG) 2 Unit(s) SubCutaneous before dinner  meropenem  IVPB      meropenem  IVPB 2000 milliGRAM(s) IV Intermittent every 8 hours  montelukast 10 milliGRAM(s) Oral daily  multivitamin/mineral Chewable Tablet (AquADEKs) 1 Tablet(s) Chew two times a day  vancomycin  IVPB 1250 milliGRAM(s) IV Intermittent every 12 hours    MEDICATIONS  (PRN):  acetaminophen   Tablet 650 milliGRAM(s) Oral every 6 hours PRN For Temp greater than 38 C (100.4 F)  amylase/lipase/protease  (CREON 24,000 Units) 3 Capsule(s) Oral three times a day with meals PRN snacks  dextrose Gel 1 Dose(s) Oral once PRN Blood Glucose LESS THAN 70 milliGRAM(s)/deciliter    Vital Signs Last 24 Hrs  T(C): 36.5 (02-24-18 @ 06:35)  T(F): 97.7 (02-24-18 @ 06:35), Max: 99.3 (02-23-18 @ 14:00)  HR: 61 (02-24-18 @ 06:35) (61 - 94)  BP: 124/71 (02-24-18 @ 06:35)  BP(mean): --  RR: 17 (02-24-18 @ 06:35) (17 - 18)  SpO2: 97% (02-24-18 @ 06:35) (97% - 98%)  Wt(kg): --    CAPILLARY BLOOD GLUCOSE  POCT Blood Glucose.: 124 mg/dL (24 Feb 2018 08:29)  POCT Blood Glucose.: 271 mg/dL (23 Feb 2018 22:32)  POCT Blood Glucose.: 130 mg/dL (23 Feb 2018 17:13)  POCT Blood Glucose.: 296 mg/dL (23 Feb 2018 11:55)    PHYSICAL EXAM:  GENERAL: NAD  HEAD:  Atraumatic, Normocephalic  EYES: EOMI, PERRLA, conjunctiva and sclera clear  ENMT: No tonsillar erythema, exudates,; Moist mucous membranes. No lesions  NECK: Supple, No JVD, Normal thyroid  CHEST/LUNG: Fine, coarse breath sounds heard in all lung fields  HEART: Regular rate and rhythm; No murmurs, rubs, or gallops  ABDOMEN: Soft, Nontender, Nondistended; Bowel sounds present.  No hepatosplenomegaly  EXTREMITIES:  2+ Peripheral Pulses, No clubbing, cyanosis, or edema  LYMPH: No lymphadenopathy noted  SKIN: No rashes or lesions  PSYCH: Alert & Oriented x3    LABS:                        14.5   5.32  )-----------( 233      ( 24 Feb 2018 07:40 )             40.8     02-24    140  |  101  |  15  ----------------------------<  130<H>  4.6   |  29  |  0.94    Ca    8.6      24 Feb 2018 07:40  Phos  3.5     02-24  Mg     2.2     02-24    Gram Stain Sputum:   GPCPR^Gram Pos Cocci in Pairs  QUANTITY OF BACTERIA SEEN: FEW (2+)  WBC^White Blood Cells  QNTY CELLS IN GRAM STAIN: NO CELLS SEEN (02.22.18 @ 16:09)      RADIOLOGY & ADDITIONAL TESTS:  Imaging Personally Reviewed:  no new imaging    Consultant(s) Notes Reviewed:  Endocrinology

## 2018-02-25 LAB
BUN SERPL-MCNC: 15 MG/DL — SIGNIFICANT CHANGE UP (ref 7–23)
CALCIUM SERPL-MCNC: 8.9 MG/DL — SIGNIFICANT CHANGE UP (ref 8.4–10.5)
CHLORIDE SERPL-SCNC: 101 MMOL/L — SIGNIFICANT CHANGE UP (ref 98–107)
CO2 SERPL-SCNC: 27 MMOL/L — SIGNIFICANT CHANGE UP (ref 22–31)
CREAT SERPL-MCNC: 0.87 MG/DL — SIGNIFICANT CHANGE UP (ref 0.5–1.3)
GLUCOSE BLDC GLUCOMTR-MCNC: 139 MG/DL — HIGH (ref 70–99)
GLUCOSE BLDC GLUCOMTR-MCNC: 154 MG/DL — HIGH (ref 70–99)
GLUCOSE BLDC GLUCOMTR-MCNC: 218 MG/DL — HIGH (ref 70–99)
GLUCOSE BLDC GLUCOMTR-MCNC: 249 MG/DL — HIGH (ref 70–99)
GLUCOSE SERPL-MCNC: 141 MG/DL — HIGH (ref 70–99)
GRAM STN SPT: SIGNIFICANT CHANGE UP
HCT VFR BLD CALC: 42.7 % — SIGNIFICANT CHANGE UP (ref 39–50)
HGB BLD-MCNC: 15.2 G/DL — SIGNIFICANT CHANGE UP (ref 13–17)
MAGNESIUM SERPL-MCNC: 2.2 MG/DL — SIGNIFICANT CHANGE UP (ref 1.6–2.6)
MCHC RBC-ENTMCNC: 30.2 PG — SIGNIFICANT CHANGE UP (ref 27–34)
MCHC RBC-ENTMCNC: 35.6 % — SIGNIFICANT CHANGE UP (ref 32–36)
MCV RBC AUTO: 84.7 FL — SIGNIFICANT CHANGE UP (ref 80–100)
METHOD TYPE: SIGNIFICANT CHANGE UP
NRBC # FLD: 0 — SIGNIFICANT CHANGE UP
ORGANISM # SPEC MICROSCOPIC CNT: SIGNIFICANT CHANGE UP
ORGANISM # SPEC MICROSCOPIC CNT: SIGNIFICANT CHANGE UP
PHOSPHATE SERPL-MCNC: 2.6 MG/DL — SIGNIFICANT CHANGE UP (ref 2.5–4.5)
PLATELET # BLD AUTO: 238 K/UL — SIGNIFICANT CHANGE UP (ref 150–400)
PMV BLD: 9.9 FL — SIGNIFICANT CHANGE UP (ref 7–13)
POTASSIUM SERPL-MCNC: 4.7 MMOL/L — SIGNIFICANT CHANGE UP (ref 3.5–5.3)
POTASSIUM SERPL-SCNC: 4.7 MMOL/L — SIGNIFICANT CHANGE UP (ref 3.5–5.3)
RBC # BLD: 5.04 M/UL — SIGNIFICANT CHANGE UP (ref 4.2–5.8)
RBC # FLD: 12.8 % — SIGNIFICANT CHANGE UP (ref 10.3–14.5)
SODIUM SERPL-SCNC: 141 MMOL/L — SIGNIFICANT CHANGE UP (ref 135–145)
WBC # BLD: 5.3 K/UL — SIGNIFICANT CHANGE UP (ref 3.8–10.5)
WBC # FLD AUTO: 5.3 K/UL — SIGNIFICANT CHANGE UP (ref 3.8–10.5)

## 2018-02-25 PROCEDURE — 99232 SBSQ HOSP IP/OBS MODERATE 35: CPT

## 2018-02-25 RX ORDER — SODIUM CHLORIDE 9 MG/ML
4 INJECTION INTRAMUSCULAR; INTRAVENOUS; SUBCUTANEOUS DAILY
Qty: 0 | Refills: 0 | Status: DISCONTINUED | OUTPATIENT
Start: 2018-02-25 | End: 2018-02-26

## 2018-02-25 RX ADMIN — MONTELUKAST 10 MILLIGRAM(S): 4 TABLET, CHEWABLE ORAL at 12:38

## 2018-02-25 RX ADMIN — Medication 166.67 MILLIGRAM(S): at 22:26

## 2018-02-25 RX ADMIN — Medication 2 UNIT(S): at 18:04

## 2018-02-25 RX ADMIN — Medication 1 TABLET(S): at 05:03

## 2018-02-25 RX ADMIN — Medication 1: at 08:41

## 2018-02-25 RX ADMIN — MEROPENEM 200 MILLIGRAM(S): 1 INJECTION INTRAVENOUS at 03:18

## 2018-02-25 RX ADMIN — Medication 166.67 MILLIGRAM(S): at 00:55

## 2018-02-25 RX ADMIN — Medication 6 CAPSULE(S): at 08:42

## 2018-02-25 RX ADMIN — SODIUM CHLORIDE 4 MILLILITER(S): 9 INJECTION INTRAMUSCULAR; INTRAVENOUS; SUBCUTANEOUS at 16:37

## 2018-02-25 RX ADMIN — FAMOTIDINE 20 MILLIGRAM(S): 10 INJECTION INTRAVENOUS at 22:26

## 2018-02-25 RX ADMIN — Medication 3 MILLIGRAM(S): at 22:35

## 2018-02-25 RX ADMIN — Medication 2: at 12:38

## 2018-02-25 RX ADMIN — DORNASE ALFA 2.5 MILLIGRAM(S): 1 SOLUTION RESPIRATORY (INHALATION) at 10:18

## 2018-02-25 RX ADMIN — BUDESONIDE AND FORMOTEROL FUMARATE DIHYDRATE 2 PUFF(S): 160; 4.5 AEROSOL RESPIRATORY (INHALATION) at 10:10

## 2018-02-25 RX ADMIN — MEROPENEM 200 MILLIGRAM(S): 1 INJECTION INTRAVENOUS at 18:04

## 2018-02-25 RX ADMIN — Medication 1 TABLET(S): at 18:04

## 2018-02-25 RX ADMIN — BUDESONIDE AND FORMOTEROL FUMARATE DIHYDRATE 2 PUFF(S): 160; 4.5 AEROSOL RESPIRATORY (INHALATION) at 20:01

## 2018-02-25 RX ADMIN — MEROPENEM 200 MILLIGRAM(S): 1 INJECTION INTRAVENOUS at 10:10

## 2018-02-25 RX ADMIN — Medication 166.67 MILLIGRAM(S): at 12:37

## 2018-02-25 NOTE — PROGRESS NOTE ADULT - PROBLEM SELECTOR PLAN 4
Pt has glucose of 404. Pt was recently dx with DM1 last hospitalization and does not take insulin at home.   - endocrinology started dinnertime humalog 2U as well as HISS  - trend FS today and consider addition breakfast +/- lunch today  - diabetes education

## 2018-02-25 NOTE — PROGRESS NOTE ADULT - SUBJECTIVE AND OBJECTIVE BOX
PROVIDER CONTACT INFO:  MD HERIBERTO Novoa Pager: [phone]  Long Range Pager: [phone]    SOCORRO STEPHEN  27y  Male      Patient is a 27y old  Male who presents with a chief complaint of Hemoptysis (2018 15:48)      INTERVAL HPI/OVERNIGHT EVENTS: No overnight events. This AM pt denies F/C/N/V, CP/SOB, hemoptysis, diarrhea, constipation.     T(C): 36.4 (18 @ 05:00), Max: 36.8 (18 @ 15:26)  HR: 61 (18 @ 05:00) (61 - 92)  BP: 118/68 (18 @ 05:00) (118/68 - 135/62)  RR: 18 (18 @ 05:00) (17 - 18)  SpO2: 100% (18 @ 05:00) (95% - 100%)  Wt(kg): --Vital Signs Last 24 Hrs  T(C): 36.4 (2018 05:00), Max: 36.8 (2018 15:26)  T(F): 97.6 (2018 05:00), Max: 98.3 (2018 15:26)  HR: 61 (2018 05:00) (61 - 92)  BP: 118/68 (2018 05:00) (118/68 - 135/62)  BP(mean): --  RR: 18 (2018 05:00) (17 - 18)  SpO2: 100% (2018 05:00) (95% - 100%)    PHYSICAL EXAM:  GENERAL: NAD, well-groomed, well-developed  HEAD:  Atraumatic, Normocephalic  EYES: EOMI, PERRLA, conjunctiva and sclera clear  ENMT: No tonsillar erythema, exudates,; Moist mucous membranes. No lesions  NECK: Supple, No JVD, Normal thyroid  CHEST/LUNG: Clear to percussion bilaterally; No rales, rhonchi, wheezing, or rubs  HEART: Regular rate and rhythm; No murmurs, rubs, or gallops  ABDOMEN: Soft, Nontender, Nondistended; Bowel sounds present.  No hepatosplenomegaly  EXTREMITIES:  2+ Peripheral Pulses, No clubbing, cyanosis, or edema  LYMPH: No lymphadenopathy noted  SKIN: No rashes or lesions  PSYCH: Alert & Oriented x3  NERVOUS SYSTEM:  ; Motor Strength 5/5 B/L upper and lower extremities.  Sensory intact    Consultant(s) Notes Reviewed:  [x ] YES  [ ] NO  Care Discussed with Consultants/Other Providers [ x] YES  [ ] NO    LABS:                        14.5   5.32  )-----------( 233      ( 2018 07:40 )             40.8     02-24    140  |  101  |  15  ----------------------------<  130<H>  4.6   |  29  |  0.94    Ca    8.6      2018 07:40  Phos  3.5     -  Mg     2.2     -24        Urinalysis Basic - ( 2018 20:02 )    Color: YELLOW / Appearance: CLEAR / S.033 / pH: 6.0  Gluc: 50 / Ketone: SMALL  / Bili: NEGATIVE / Urobili: 2 mg/dL   Blood: NEGATIVE / Protein: 20 mg/dL / Nitrite: NEGATIVE   Leuk Esterase: NEGATIVE / RBC: 0-2 / WBC 0-2   Sq Epi: OCC / Non Sq Epi: x / Bacteria: FEW      CAPILLARY BLOOD GLUCOSE      POCT Blood Glucose.: 234 mg/dL (2018 22:29)  POCT Blood Glucose.: 239 mg/dL (2018 17:25)  POCT Blood Glucose.: 143 mg/dL (2018 12:06)  POCT Blood Glucose.: 124 mg/dL (2018 08:29)        Urinalysis Basic - ( 2018 20:02 )    Color: YELLOW / Appearance: CLEAR / S.033 / pH: 6.0  Gluc: 50 / Ketone: SMALL  / Bili: NEGATIVE / Urobili: 2 mg/dL   Blood: NEGATIVE / Protein: 20 mg/dL / Nitrite: NEGATIVE   Leuk Esterase: NEGATIVE / RBC: 0-2 / WBC 0-2   Sq Epi: OCC / Non Sq Epi: x / Bacteria: FEW

## 2018-02-25 NOTE — PROGRESS NOTE ADULT - PROBLEM SELECTOR PLAN 1
Pt has three episodes of hemoptysis prior to presentation, most likely 2/2 recurrent CF exacerbation that failed outpatient treatment. Has not had any episodes as inpatient.  - c/w meropenem and vancomycin per previous CF exacerbation sensitivities (day 5). Vanc dose increased yesterday to 1250mg Q12H  - if hemoptysis continues, consider diagnostic bronchoscopy   - pulm following, appreciate recs  - f/u acid fast sputum with smear  - PICC line for long term abx pending 2/26  - chest PT  - will discuss with pulm whether pulmozyme and hypersal is needed.

## 2018-02-26 VITALS
RESPIRATION RATE: 17 BRPM | OXYGEN SATURATION: 100 % | TEMPERATURE: 98 F | SYSTOLIC BLOOD PRESSURE: 122 MMHG | DIASTOLIC BLOOD PRESSURE: 67 MMHG | HEART RATE: 73 BPM

## 2018-02-26 LAB
APTT BLD: 29.9 SEC — SIGNIFICANT CHANGE UP (ref 27.5–37.4)
BUN SERPL-MCNC: 11 MG/DL — SIGNIFICANT CHANGE UP (ref 7–23)
CALCIUM SERPL-MCNC: 8.1 MG/DL — LOW (ref 8.4–10.5)
CHLORIDE SERPL-SCNC: 105 MMOL/L — SIGNIFICANT CHANGE UP (ref 98–107)
CO2 SERPL-SCNC: 26 MMOL/L — SIGNIFICANT CHANGE UP (ref 22–31)
CREAT SERPL-MCNC: 0.83 MG/DL — SIGNIFICANT CHANGE UP (ref 0.5–1.3)
GLUCOSE BLDC GLUCOMTR-MCNC: 146 MG/DL — HIGH (ref 70–99)
GLUCOSE BLDC GLUCOMTR-MCNC: 216 MG/DL — HIGH (ref 70–99)
GLUCOSE SERPL-MCNC: 141 MG/DL — HIGH (ref 70–99)
HCT VFR BLD CALC: 37.7 % — LOW (ref 39–50)
HGB BLD-MCNC: 12.8 G/DL — LOW (ref 13–17)
INR BLD: 1.15 — SIGNIFICANT CHANGE UP (ref 0.88–1.17)
MAGNESIUM SERPL-MCNC: 2.1 MG/DL — SIGNIFICANT CHANGE UP (ref 1.6–2.6)
MCHC RBC-ENTMCNC: 29.1 PG — SIGNIFICANT CHANGE UP (ref 27–34)
MCHC RBC-ENTMCNC: 34 % — SIGNIFICANT CHANGE UP (ref 32–36)
MCV RBC AUTO: 85.7 FL — SIGNIFICANT CHANGE UP (ref 80–100)
NRBC # FLD: 0 — SIGNIFICANT CHANGE UP
ORGANISM # SPEC MICROSCOPIC CNT: SIGNIFICANT CHANGE UP
ORGANISM # SPEC MICROSCOPIC CNT: SIGNIFICANT CHANGE UP
PHOSPHATE SERPL-MCNC: 2.6 MG/DL — SIGNIFICANT CHANGE UP (ref 2.5–4.5)
PLATELET # BLD AUTO: 212 K/UL — SIGNIFICANT CHANGE UP (ref 150–400)
PMV BLD: 10.1 FL — SIGNIFICANT CHANGE UP (ref 7–13)
POTASSIUM SERPL-MCNC: 5.4 MMOL/L — HIGH (ref 3.5–5.3)
POTASSIUM SERPL-SCNC: 5.4 MMOL/L — HIGH (ref 3.5–5.3)
PROTHROM AB SERPL-ACNC: 13.3 SEC — HIGH (ref 9.8–13.1)
RBC # BLD: 4.4 M/UL — SIGNIFICANT CHANGE UP (ref 4.2–5.8)
RBC # FLD: 12.8 % — SIGNIFICANT CHANGE UP (ref 10.3–14.5)
SODIUM SERPL-SCNC: 142 MMOL/L — SIGNIFICANT CHANGE UP (ref 135–145)
VANCOMYCIN TROUGH SERPL-MCNC: 10.8 UG/ML — SIGNIFICANT CHANGE UP (ref 10–20)
WBC # BLD: 4.44 K/UL — SIGNIFICANT CHANGE UP (ref 3.8–10.5)
WBC # FLD AUTO: 4.44 K/UL — SIGNIFICANT CHANGE UP (ref 3.8–10.5)

## 2018-02-26 PROCEDURE — 36569 INSJ PICC 5 YR+ W/O IMAGING: CPT

## 2018-02-26 PROCEDURE — 99232 SBSQ HOSP IP/OBS MODERATE 35: CPT

## 2018-02-26 PROCEDURE — 99232 SBSQ HOSP IP/OBS MODERATE 35: CPT | Mod: GC

## 2018-02-26 PROCEDURE — 76937 US GUIDE VASCULAR ACCESS: CPT | Mod: 26

## 2018-02-26 PROCEDURE — 77001 FLUOROGUIDE FOR VEIN DEVICE: CPT | Mod: 26,GC

## 2018-02-26 RX ORDER — MEROPENEM 1 G/30ML
2000 INJECTION INTRAVENOUS
Qty: 54000 | Refills: 0
Start: 2018-02-26 | End: 2018-03-06

## 2018-02-26 RX ORDER — DIPHENHYDRAMINE HCL 50 MG
1 CAPSULE ORAL
Qty: 0 | Refills: 0 | DISCHARGE
Start: 2018-02-26

## 2018-02-26 RX ORDER — INSULIN LISPRO 100/ML
2 VIAL (ML) SUBCUTANEOUS
Qty: 0 | Refills: 0 | Status: DISCONTINUED | OUTPATIENT
Start: 2018-02-26 | End: 2018-02-26

## 2018-02-26 RX ORDER — VANCOMYCIN HCL 1 G
250 VIAL (EA) INTRAVENOUS
Qty: 4500 | Refills: 0
Start: 2018-02-26 | End: 2018-03-06

## 2018-02-26 RX ADMIN — BUDESONIDE AND FORMOTEROL FUMARATE DIHYDRATE 2 PUFF(S): 160; 4.5 AEROSOL RESPIRATORY (INHALATION) at 08:46

## 2018-02-26 RX ADMIN — Medication 166.67 MILLIGRAM(S): at 13:41

## 2018-02-26 RX ADMIN — SODIUM CHLORIDE 4 MILLILITER(S): 9 INJECTION INTRAMUSCULAR; INTRAVENOUS; SUBCUTANEOUS at 11:09

## 2018-02-26 RX ADMIN — Medication 6 CAPSULE(S): at 08:45

## 2018-02-26 RX ADMIN — MEROPENEM 200 MILLIGRAM(S): 1 INJECTION INTRAVENOUS at 01:39

## 2018-02-26 RX ADMIN — Medication 1 TABLET(S): at 06:18

## 2018-02-26 RX ADMIN — MONTELUKAST 10 MILLIGRAM(S): 4 TABLET, CHEWABLE ORAL at 12:53

## 2018-02-26 RX ADMIN — MEROPENEM 200 MILLIGRAM(S): 1 INJECTION INTRAVENOUS at 09:34

## 2018-02-26 RX ADMIN — DORNASE ALFA 2.5 MILLIGRAM(S): 1 SOLUTION RESPIRATORY (INHALATION) at 11:13

## 2018-02-26 RX ADMIN — Medication 2: at 12:53

## 2018-02-26 NOTE — PROGRESS NOTE ADULT - SUBJECTIVE AND OBJECTIVE BOX
Chief Complaint: CFRD    Interval history: Pt's sugars continue to fluctuate 100-200s over the weekend.  Appetite good.  Is currently on one dose only of standing insulin, 2 units humalog before dinner only.     MEDICATIONS  (STANDING):  amylase/lipase/protease  (CREON 24,000 Units) 6 Capsule(s) Oral three times a day with meals  buDESOnide 160 MICROgram(s)/formoterol 4.5 MICROgram(s) Inhaler 2 Puff(s) Inhalation two times a day  diphenhydrAMINE   Capsule 25 milliGRAM(s) Oral every 12 hours  dornase melody Solution 2.5 milliGRAM(s) Inhalation daily  famotidine    Tablet 20 milliGRAM(s) Oral at bedtime  insulin lispro (HumaLOG) corrective regimen sliding scale   SubCutaneous three times a day before meals  insulin lispro (HumaLOG) corrective regimen sliding scale   SubCutaneous at bedtime  insulin lispro Injectable (HumaLOG) 2 Unit(s) SubCutaneous three times a day before meals  melatonin 3 milliGRAM(s) Oral at bedtime  meropenem  IVPB      meropenem  IVPB 2000 milliGRAM(s) IV Intermittent every 8 hours  montelukast 10 milliGRAM(s) Oral daily  multivitamin/mineral Chewable Tablet (AquADEKs) 1 Tablet(s) Chew two times a day  sodium chloride 7% Inhalation 4 milliLiter(s) Inhalation daily  vancomycin  IVPB 1250 milliGRAM(s) IV Intermittent every 12 hours    MEDICATIONS  (PRN):  acetaminophen   Tablet 650 milliGRAM(s) Oral every 6 hours PRN For Temp greater than 38 C (100.4 F)  acetaminophen   Tablet. 650 milliGRAM(s) Oral every 6 hours PRN Severe Pain (7 - 10)  amylase/lipase/protease  (CREON 24,000 Units) 3 Capsule(s) Oral three times a day with meals PRN snacks  dextrose Gel 1 Dose(s) Oral once PRN Blood Glucose LESS THAN 70 milliGRAM(s)/deciliter      Allergies    No Known Allergies    Intolerances    Benadryl (Flushing (Skin))    Review of Systems:  Skin: no rash  Endocrine: no polyuria, polydipsia    ALL OTHER SYSTEMS REVIEWED AND NEGATIVE    PHYSICAL EXAM:  VITALS: T(C): 36.4 (02-26-18 @ 06:00)  T(F): 97.6 (02-26-18 @ 06:00), Max: 99 (02-25-18 @ 15:27)  HR: 87 (02-26-18 @ 11:09) (60 - 88)  BP: 120/65 (02-26-18 @ 06:00) (112/49 - 129/74)  RR:  (16 - 18)  SpO2:  (97% - 100%)  Wt(kg): --  GENERAL: NAD, well-developed  EYES: No proptosis, sclera anicteric  HEENT:  Atraumatic, Normocephalic, moist mucous membranes  SKIN: Dry, intact, No diffuse rashes     POCT Blood Glucose.: 216 mg/dL (02-26-18 @ 12:07)  POCT Blood Glucose.: 146 mg/dL (02-26-18 @ 08:30)  POCT Blood Glucose.: 249 mg/dL (02-25-18 @ 22:23)  POCT Blood Glucose.: 139 mg/dL (02-25-18 @ 17:33)  POCT Blood Glucose.: 218 mg/dL (02-25-18 @ 12:29)  POCT Blood Glucose.: 154 mg/dL (02-25-18 @ 08:32)  POCT Blood Glucose.: 234 mg/dL (02-24-18 @ 22:29)  POCT Blood Glucose.: 239 mg/dL (02-24-18 @ 17:25)  POCT Blood Glucose.: 143 mg/dL (02-24-18 @ 12:06)  POCT Blood Glucose.: 124 mg/dL (02-24-18 @ 08:29)  POCT Blood Glucose.: 271 mg/dL (02-23-18 @ 22:32)  POCT Blood Glucose.: 130 mg/dL (02-23-18 @ 17:13)      02-26    142  |  105  |  11  ----------------------------<  141<H>  5.4<H>   |  26  |  0.83    EGFR if : 141  EGFR if non : 121    Ca    8.1<L>      02-26  Mg     2.1     02-26  Phos  2.6     02-26            Thyroid Function Tests:      Hemoglobin A1C, Whole Blood: 7.2 % <H> [4.0 - 5.6] (02-22-18 @ 06:55)

## 2018-02-26 NOTE — DISCHARGE NOTE ADULT - HOSPITAL COURSE
Pt is a 27M with PMHx of CF (last hospitalization in August 2017), recently completed bactrim/rifamin one and a half weeks prior to presentation for recent CF exacerbation, p/w hemoptysis. First episode was last night and was enough to fill the toilet bowl. Second episode was this morning and third episode was in the ED which was smaller in quantity. Pt endorses increased SOB higher than his baseline, endorses cough that is his usual baseline. Denies recent weight loss, night sweats, fever/chills, CP, palpitations, lightheadedness, pleuritic chest pain, nausea, melena, BRBPR. Denies any syncopal episodes.     In ED, VS were T98.1 HR65 /71 RR16 SaO2 100% on RA. His labs were significant for a blood glucose of 404 on BMP, but were otherwise unremarkable. CXR showed previously documented lung abnormalities, but no new focal opacities. He was given albuterol x1 and pulmozyme and admitted to the general medical floor for further management of his CF exacerbation. Upon admission he was continued on vancomycin and meropenem. He spiked one fever overnight after admission. Blood cultures were negative, sputum cultures grew MSSA. A PICC line was placed for long term antibiotics. He was discharged on a 14 day course of vancomycin and meropenem with instruction to follow-up with his pulmonologist and primary care provider within one week of discharge. Pt is a 27M with PMHx of CF (last hospitalization in August 2017), recently completed bactrim/rifamin one and a half weeks prior to presentation for recent CF exacerbation, p/w hemoptysis. First episode was last night and was enough to fill the toilet bowl. Second episode was this morning and third episode was in the ED which was smaller in quantity. Pt endorses increased SOB higher than his baseline, endorses cough that is his usual baseline. Denies recent weight loss, night sweats, fever/chills, CP, palpitations, lightheadedness, pleuritic chest pain, nausea, melena, BRBPR. Denies any syncopal episodes.     In ED, VS were T98.1 HR65 /71 RR16 SaO2 100% on RA. His labs were significant for a blood glucose of 404 on BMP, but were otherwise unremarkable. CXR showed previously documented lung abnormalities, but no new focal opacities. He was given albuterol and as admitted for CF exacerbation treated with IV abx vancomycin and meropenem. Chest vest, pulmozyme and hypersal were held. He spiked one fever overnight after admission. Blood cultures were negative, sputum cultures grew MSSA. A PICC line was placed for long term antibiotics. he had no more hemoptysis over withs admission 48 hours and vest followed by pulmozyme and then hypersal were reinitiated. He was discharged on a 14 day course of vancomycin and meropenem with instruction to follow-up with his pulmonologist and primary care provider within one week of discharge.

## 2018-02-26 NOTE — PROGRESS NOTE ADULT - PROBLEM SELECTOR PLAN 1
Pt has three episodes of hemoptysis prior to presentation, most likely 2/2 recurrent CF exacerbation that failed outpatient treatment. Has not had any episodes as inpatient.  - c/w meropenem and vancomycin per previous CF exacerbation sensitivities (day 5). Vanc dose increased yesterday to 1250mg Q12H  - if hemoptysis continues, consider diagnostic bronchoscopy   - pulm following, appreciate recs  - f/u acid fast sputum with smear  - PICC line for long term abx; placement today  - chest PT  - will discuss with pulm whether pulmozyme and hypersal is needed.

## 2018-02-26 NOTE — PROGRESS NOTE ADULT - SUBJECTIVE AND OBJECTIVE BOX
PROVIDER CONTACT INFO:  MD HERIBERTO Novoa Pager: [phone]  Long Range Pager: [phone]    SOCORRO STEPHEN  27y  Male      Patient is a 27y old  Male who presents with a chief complaint of Hemoptysis (2018 15:48)      INTERVAL HPI/OVERNIGHT EVENTS: No overnight events.    T(C): 36.4 (18 @ 06:00), Max: 37.2 (18 @ 15:27)  HR: 60 (18 @ 06:00) (60 - 77)  BP: 120/65 (18 @ 06:00) (112/49 - 129/74)  RR: 16 (18 @ 06:00) (16 - 18)  SpO2: 100% (18 @ 06:00) (97% - 100%)  Wt(kg): --Vital Signs Last 24 Hrs  T(C): 36.4 (2018 06:00), Max: 37.2 (2018 15:27)  T(F): 97.6 (2018 06:00), Max: 99 (2018 15:27)  HR: 60 (2018 06:00) (60 - 77)  BP: 120/65 (2018 06:00) (112/49 - 129/74)  BP(mean): --  RR: 16 (2018 06:00) (16 - 18)  SpO2: 100% (2018 06:00) (97% - 100%)    PHYSICAL EXAM:  GENERAL: NAD, well-groomed, well-developed  HEAD:  Atraumatic, Normocephalic  EYES: EOMI, PERRLA, conjunctiva and sclera clear  ENMT: No tonsillar erythema, exudates,; Moist mucous membranes. No lesions  NECK: Supple, No JVD, Normal thyroid  CHEST/LUNG: Clear to percussion bilaterally; No rales, rhonchi, wheezing, or rubs  HEART: Regular rate and rhythm; No murmurs, rubs, or gallops  ABDOMEN: Soft, Nontender, Nondistended; Bowel sounds present.  No hepatosplenomegaly  EXTREMITIES:  2+ Peripheral Pulses, No clubbing, cyanosis, or edema  LYMPH: No lymphadenopathy noted  SKIN: No rashes or lesions  PSYCH: Alert & Oriented x3    Consultant(s) Notes Reviewed:  [x ] YES  [ ] NO  Care Discussed with Consultants/Other Providers [ x] YES  [ ] NO    LABS:                        15.2   5.30  )-----------( 238      ( 2018 11:00 )             42.7     -    141  |  101  |  15  ----------------------------<  141<H>  4.7   |  27  |  0.87    Ca    8.9      2018 11:00  Phos  2.6     -  Mg     2.2             Urinalysis Basic - ( 2018 20:02 )    Color: YELLOW / Appearance: CLEAR / S.033 / pH: 6.0  Gluc: 50 / Ketone: SMALL  / Bili: NEGATIVE / Urobili: 2 mg/dL   Blood: NEGATIVE / Protein: 20 mg/dL / Nitrite: NEGATIVE   Leuk Esterase: NEGATIVE / RBC: 0-2 / WBC 0-2   Sq Epi: OCC / Non Sq Epi: x / Bacteria: FEW      CAPILLARY BLOOD GLUCOSE      POCT Blood Glucose.: 249 mg/dL (2018 22:23)  POCT Blood Glucose.: 139 mg/dL (2018 17:33)  POCT Blood Glucose.: 218 mg/dL (2018 12:29)  POCT Blood Glucose.: 154 mg/dL (2018 08:32)        Urinalysis Basic - ( 2018 20:02 )    Color: YELLOW / Appearance: CLEAR / S.033 / pH: 6.0  Gluc: 50 / Ketone: SMALL  / Bili: NEGATIVE / Urobili: 2 mg/dL   Blood: NEGATIVE / Protein: 20 mg/dL / Nitrite: NEGATIVE   Leuk Esterase: NEGATIVE / RBC: 0-2 / WBC 0-2   Sq Epi: OCC / Non Sq Epi: x / Bacteria: FEW

## 2018-02-26 NOTE — PROGRESS NOTE ADULT - PROBLEM SELECTOR PROBLEM 2
Cystic fibrosis with pulmonary manifestations

## 2018-02-26 NOTE — PROGRESS NOTE ADULT - PROVIDER SPECIALTY LIST ADULT
Endocrinology
Internal Medicine
Endocrinology
Internal Medicine
Internal Medicine

## 2018-02-26 NOTE — PROGRESS NOTE ADULT - ATTENDING COMMENTS
26 yo wit CF, hx of MRSA/MSSA/Burkholderia/Pseudomonas admitted with CF exacerbation. No fevers O/N. Pending PICC. No hemoptysis s/p pulmozyme; will start hypersal today. Continue with pulmonary toilet. Full diet. DVT ppx.
26 yo wit CF, hx of MRSA/MSSA/Burkholderia/Pseudomonas admitted with CF exacerbation. No fevers O/N. Pending PICC. No hemoptysis; will start pulmozyme. Continue with pulmonary toilet. Full diet. DVT ppx.
28 yo wit CF, hx of MRSA/MSSA/Burkholderia/Pseudomonas admitted with CF exacerbation. No fevers, hemoptysis. Feels well. Wants to go home. S/P PICC today. Plan for discharge home with meropenem and vancomycin. Outpatient follow up with Dr. Wesley.
Continue abx, plan for PICC  Starting chest vest tonight, if no hemoptysis, will start pulmozyme tomorrow.
CF exacerbation, no hemoptysis since yesterday, will start chest PT, no pulmozyme or hypersal yet.  Continue abx, PICC order.

## 2018-02-26 NOTE — DISCHARGE NOTE ADULT - MEDICATION SUMMARY - MEDICATIONS TO TAKE
I will START or STAY ON the medications listed below when I get home from the hospital:    Bloodwork  -- Vanc Trough and CMP once on 3/5/18  -- Indication: For Cystic fibrosis exacerbation    diphenhydrAMINE 25 mg oral capsule  -- 1 cap(s) by mouth every 12 hours  -- Indication: For Vancomycin     cetirizine 10 mg oral tablet  -- 1 tab(s) by mouth once a day  -- Indication: For Allergies    albuterol 2.5 mg/3 mL (0.083%) inhalation solution  -- 3 milliliter(s) inhaled every 6 hours, As Needed  -- Indication: For Cystic fibrosis    meropenem 1000 mg/ 50 mL-NaCl 0.9% intravenous solution  -- 2000 milligram(s) intravenously every 8 hours   -- May cause drowsiness or dizziness.  This drug may impair the ability to drive or operate machinery.  Use care until you become familiar with its effects.    -- Indication: For Cystic fibrosis exacerbation    pancrelipase 24,000 units-76,000 units-120,000 units oral delayed release capsule  -- 6 cap(s) by mouth 3 times a day (with meals)  -- Indication: For Cystic fibrosis    vancomycin 1.25 g/250 mL-NaCl 0.9% intravenous solution  -- 250 milliliter(s) intravenous 2 times a day   -- Indication: For Cystic fibrosis exacerbation    montelukast 10 mg oral tablet  -- 1 tab(s) by mouth once a day (in the evening)  -- Indication: For Cystic fibrosis    Sodium Chloride, Inhalation 3% inhalation solution  -- 1 inhaler(s) inhaled 2 times a day  -- Indication: For Cystic fibrosis    Pulmozyme  -- 1 milligram(s) inhaled 2 times a day  -- Indication: For Cystic fibrosis    Multiple Vitamins oral tablet  -- 1 tab(s) by mouth once a day  -- Indication: For Health Maintenance

## 2018-02-26 NOTE — DISCHARGE NOTE ADULT - PATIENT PORTAL LINK FT
You can access the [facility] Portal, offered by [facility], by registering with the following website: [email or web address]

## 2018-02-26 NOTE — PROGRESS NOTE ADULT - PROBLEM SELECTOR PLAN 2
Pt has hx of cystic fibrosis, last hospitalization August 2017. Recently completed course of bactrim and rifampin.   - treatment of CF exacerbation as above  - c/w home meds - budesonide, montelukast  - saturating well on room air, uptitrate O2 requirements as needed  --will discuss with pulm whether pulmozyme and hypersal is needed.
Pt has hx of cystic fibrosis, last hospitalization August 2017. Recently completed course of bactrim and rifampin.   - treatment of CF exacerbation as above  - c/w home meds - budesonide, montelukast  - saturating well on room air, uptitrate O2 requirements as needed  --will discuss with pulm whether pulmozyme and hypersal is needed.
Pt has hx of cystic fibrosis, last hospitalization August 2017. Recently completed course of bactrim and rifampin.   - treatment of CF exacerbation as above  - c/w home meds - budesonide, montelukast  - saturating well on room air, uptitrate O2 requirements as needed
Pt has hx of cystic fibrosis, last hospitalization August 2017. Recently completed course of bactrim and rifampin.   - treatment of CF exacerbation as above  - c/w home meds - budesonide, montelukast  - saturating well on room air, uptitrate O2 requirements as needed
Pt has hx of cystic fibrosis, last hospitalization August 2017. Recently completed course of bactrim and rifampin.   - treatment of CF exacerbation as above  - c/w home meds - budesonide, montelukast  - saturating well on room air, uptitrate O2 requirements as needed  --will discuss with pulm whether pulmozyme and hypersal is needed.

## 2018-02-26 NOTE — DISCHARGE NOTE ADULT - CARE PROVIDER_API CALL
Dilcia Wesley), Critical Care Medicine; Internal Medicine; Pulmonary Disease; Sleep Medicine  [address] [place] [address]  Phone: [phone]  Fax: [phone]

## 2018-02-26 NOTE — DISCHARGE NOTE ADULT - PLAN OF CARE
Improvement in Symptoms In the hospital you were found to have an exacerbation of your cystic fibrosis. You were started on a course of antibiotics to treat this infection. You will need to finish a 14 day course of these antibiotics. Please follow-up with your pulmonologist within one week of discharge. Return to the hospital if you experience worsening shortness of breath or blood tinged sputum/cough. You have a history of hyperglycemia secondary to decreased pancreatic function. You were given insulin in the hospital. You do not need to continue insulin as an outpatient. Please follow-up with your endocrinologist within one week of discharge for follow-up.

## 2018-02-26 NOTE — DISCHARGE NOTE ADULT - CARE PLAN
Principal Discharge DX:	Cystic fibrosis  Goal:	Improvement in Symptoms  Assessment and plan of treatment:	In the hospital you were found to have an exacerbation of your cystic fibrosis. You were started on a course of antibiotics to treat this infection. You will need to finish a 14 day course of these antibiotics. Please follow-up with your pulmonologist within one week of discharge. Return to the hospital if you experience worsening shortness of breath or blood tinged sputum/cough.  Secondary Diagnosis:	Hyperglycemia Principal Discharge DX:	Cystic fibrosis  Goal:	Improvement in Symptoms  Assessment and plan of treatment:	In the hospital you were found to have an exacerbation of your cystic fibrosis. You were started on a course of antibiotics to treat this infection. You will need to finish a 14 day course of these antibiotics. Please follow-up with your pulmonologist within one week of discharge. Return to the hospital if you experience worsening shortness of breath or blood tinged sputum/cough.  Secondary Diagnosis:	Hyperglycemia  Assessment and plan of treatment:	You have a history of hyperglycemia secondary to decreased pancreatic function. You were given insulin in the hospital. You do not need to continue insulin as an outpatient. Please follow-up with your endocrinologist within one week of discharge for follow-up.

## 2018-02-26 NOTE — PROGRESS NOTE ADULT - PROBLEM SELECTOR PLAN 1
While in hospital would continue low dose humalog sliding scale before meals and at bedtime  -Goal glucose 100-180  -will add a dose of 2 units of humalog at breakfast and lunch now given that sugars continue to fluctuate throughout day; therefore, pt will now be on 2 units humalog tidac   Would hold this dose if pt NPO or not eating a meal  -Patient can f/u with Dr Martin his current endocrinologist as outpatient.  If sugars consistently run >200 or start to dip <70, please call us for additional help.  On prior visits, he's done well on the above regimen.   D/c home off insulin.

## 2018-02-26 NOTE — PROGRESS NOTE ADULT - PROBLEM SELECTOR PLAN 3
Pt has mild SOB higher than his baseline.   - c/w meds described above

## 2018-02-26 NOTE — DISCHARGE NOTE ADULT - ADDITIONAL INSTRUCTIONS
- You are to follow-up in the next 1-2 weeks with [facility] Division of Pulmonary, Critical Care, and Sleep Medicine at [facility]. You have an appointment on March 15th at 2:00PM.  - You are to follow-up in the next 1-2 weeks with [facility] Division of Endocrinology at [facility]. Your endocrinologist is Dr. Olmedo. Please call [phone] for an appointment.  - Continue IV antibiotics through March 7th, 2018 - You are to follow-up in the next 1-2 weeks with [facility] Division of Pulmonary, Critical Care, and Sleep Medicine at [facility]. You have an appointment on March 15th at 2:00PM at [address] [place] [address] in [place], [place].   - You are to follow-up in the next 1-2 weeks with [facility] Division of Endocrinology at [facility]. Your endocrinologist is Dr. Olmedo. Please call [phone] for an appointment.  - Continue IV antibiotics through March 7th, 2018

## 2018-02-26 NOTE — PROGRESS NOTE ADULT - PROBLEM SELECTOR PROBLEM 1
Diabetes mellitus due to underlying condition with hyperglycemia, unspecified long term insulin use status
Hemoptysis
Diabetes mellitus due to underlying condition with hyperglycemia, unspecified long term insulin use status
Hemoptysis

## 2018-02-26 NOTE — PROGRESS NOTE ADULT - ASSESSMENT
27 yr M with CF related DM with no complications A1C 7.2 here with CF exacerbation.
27M PMH of CF (last hospitalization in August 2017), recently completed bactrim/rifamin 1 1/2 weeks ago for recent CF exacerbation, p/w hemoptysis x3 most likely 2/2 recurrent CF exacerbation
27 yr M with CF related DM with no complications A1C 7.2 here with CF exacerbation.
27M PMH of CF (last hospitalization in August 2017), recently completed bactrim/rifamin 1 1/2 weeks ago for recent CF exacerbation, p/w hemoptysis x3 most likely 2/2 recurrent CF exacerbation
Pt is a 26 yo M with PMH of CF (last hospitalization in August 2017), recently completed bactrim/rifamin 1 1/2 weeks ago for recent CF exacerbation, p/w hemoptysis x3 most likely 2/2 recurrent CF exacerbation
Pt is a 26 yo M with PMH of CF (last hospitalization in August 2017), recently completed bactrim/rifamin 1 1/2 weeks ago for recent CF exacerbation, p/w hemoptysis x3 most likely 2/2 recurrent CF exacerbation
27M PMH of CF (last hospitalization in August 2017), recently completed bactrim/rifamin 1 1/2 weeks ago for recent CF exacerbation, p/w hemoptysis x3 most likely 2/2 recurrent CF exacerbation

## 2018-02-27 LAB
-  AMIKACIN: SIGNIFICANT CHANGE UP
-  AMIKACIN: SIGNIFICANT CHANGE UP
-  AZTREONAM: SIGNIFICANT CHANGE UP
-  AZTREONAM: SIGNIFICANT CHANGE UP
-  CEFAZOLIN: SIGNIFICANT CHANGE UP
-  CEFEPIME: SIGNIFICANT CHANGE UP
-  CEFEPIME: SIGNIFICANT CHANGE UP
-  CEFTAZIDIME: SIGNIFICANT CHANGE UP
-  CEFTAZIDIME: SIGNIFICANT CHANGE UP
-  CIPROFLOXACIN: SIGNIFICANT CHANGE UP
-  CLINDAMYCIN: SIGNIFICANT CHANGE UP
-  ERYTHROMYCIN: SIGNIFICANT CHANGE UP
-  GENTAMICIN: SIGNIFICANT CHANGE UP
-  IMIPENEM: SIGNIFICANT CHANGE UP
-  LEVOFLOXACIN: SIGNIFICANT CHANGE UP
-  MEROPENEM: SIGNIFICANT CHANGE UP
-  MEROPENEM: SIGNIFICANT CHANGE UP
-  MOXIFLOXACIN(AEROBIC): SIGNIFICANT CHANGE UP
-  OXACILLIN: SIGNIFICANT CHANGE UP
-  PIPERACILLIN/TAZOBACTAM: SIGNIFICANT CHANGE UP
-  POLYMYXIN B: SIGNIFICANT CHANGE UP
-  RIFAMPIN.: SIGNIFICANT CHANGE UP
-  TETRACYCLINE: SIGNIFICANT CHANGE UP
-  TOBRAMYCIN: SIGNIFICANT CHANGE UP
-  TOBRAMYCIN: SIGNIFICANT CHANGE UP
-  TRIMETHOPRIM/SULFAMETHOXAZOLE: SIGNIFICANT CHANGE UP
-  VANCOMYCIN: SIGNIFICANT CHANGE UP
BACTERIA BLD CULT: SIGNIFICANT CHANGE UP
BACTERIA BLD CULT: SIGNIFICANT CHANGE UP
BACTERIA SPT RESP CULT: SIGNIFICANT CHANGE UP
METHOD TYPE: SIGNIFICANT CHANGE UP
ORGANISM # SPEC MICROSCOPIC CNT: SIGNIFICANT CHANGE UP
ORGANISM # SPEC MICROSCOPIC CNT: SIGNIFICANT CHANGE UP

## 2018-03-08 LAB — RHODAMINE-AURAMINE STN SPEC: NORMAL

## 2018-03-12 ENCOUNTER — APPOINTMENT (OUTPATIENT)
Dept: PULMONOLOGY | Facility: CLINIC | Age: 28
End: 2018-03-12
Payer: COMMERCIAL

## 2018-03-12 VITALS — HEIGHT: 65 IN | BODY MASS INDEX: 22.16 KG/M2 | WEIGHT: 133 LBS

## 2018-03-12 VITALS
TEMPERATURE: 97.8 F | WEIGHT: 133 LBS | HEART RATE: 70 BPM | OXYGEN SATURATION: 98 % | RESPIRATION RATE: 15 BRPM | BODY MASS INDEX: 22.16 KG/M2 | SYSTOLIC BLOOD PRESSURE: 128 MMHG | DIASTOLIC BLOOD PRESSURE: 70 MMHG | HEIGHT: 65 IN

## 2018-03-12 PROCEDURE — 99215 OFFICE O/P EST HI 40 MIN: CPT | Mod: 25

## 2018-03-12 PROCEDURE — ZZZZZ: CPT

## 2018-03-12 PROCEDURE — 94010 BREATHING CAPACITY TEST: CPT

## 2018-03-15 ENCOUNTER — APPOINTMENT (OUTPATIENT)
Dept: PULMONOLOGY | Facility: CLINIC | Age: 28
End: 2018-03-15

## 2018-03-16 ENCOUNTER — OTHER (OUTPATIENT)
Age: 28
End: 2018-03-16

## 2018-03-19 ENCOUNTER — OTHER (OUTPATIENT)
Age: 28
End: 2018-03-19

## 2018-03-22 ENCOUNTER — APPOINTMENT (OUTPATIENT)
Dept: PULMONOLOGY | Facility: CLINIC | Age: 28
End: 2018-03-22
Payer: COMMERCIAL

## 2018-03-22 VITALS
HEIGHT: 65 IN | WEIGHT: 134 LBS | HEART RATE: 76 BPM | BODY MASS INDEX: 22.33 KG/M2 | OXYGEN SATURATION: 98 % | SYSTOLIC BLOOD PRESSURE: 120 MMHG | RESPIRATION RATE: 15 BRPM | TEMPERATURE: 97.8 F | DIASTOLIC BLOOD PRESSURE: 62 MMHG

## 2018-03-22 DIAGNOSIS — F41.9 ANXIETY DISORDER, UNSPECIFIED: ICD-10-CM

## 2018-03-22 DIAGNOSIS — H91.93 UNSPECIFIED HEARING LOSS, BILATERAL: ICD-10-CM

## 2018-03-22 PROCEDURE — 99215 OFFICE O/P EST HI 40 MIN: CPT | Mod: 25

## 2018-03-22 PROCEDURE — ZZZZZ: CPT

## 2018-03-22 PROCEDURE — 94060 EVALUATION OF WHEEZING: CPT

## 2018-03-22 PROCEDURE — 94726 PLETHYSMOGRAPHY LUNG VOLUMES: CPT

## 2018-03-22 PROCEDURE — 94729 DIFFUSING CAPACITY: CPT

## 2018-03-22 RX ORDER — SULFAMETHOXAZOLE AND TRIMETHOPRIM 800; 160 MG/1; MG/1
800-160 TABLET ORAL
Qty: 63 | Refills: 0 | Status: DISCONTINUED | COMMUNITY
Start: 2018-01-16 | End: 2018-03-22

## 2018-03-22 RX ORDER — RIFAMPIN 300 MG/1
300 CAPSULE ORAL DAILY
Qty: 42 | Refills: 0 | Status: DISCONTINUED | COMMUNITY
Start: 2018-01-16 | End: 2018-03-22

## 2018-03-23 ENCOUNTER — OTHER (OUTPATIENT)
Age: 28
End: 2018-03-23

## 2018-03-23 LAB
ALBUMIN SERPL ELPH-MCNC: 4.6 G/DL
ALP BLD-CCNC: 166 U/L
ALT SERPL-CCNC: 144 U/L
ANION GAP SERPL CALC-SCNC: 13 MMOL/L
AST SERPL-CCNC: 75 U/L
BASOPHILS # BLD AUTO: 0.03 K/UL
BASOPHILS NFR BLD AUTO: 0.3 %
BILIRUB SERPL-MCNC: 0.6 MG/DL
BUN SERPL-MCNC: 16 MG/DL
CALCIUM SERPL-MCNC: 10 MG/DL
CHLORIDE SERPL-SCNC: 101 MMOL/L
CO2 SERPL-SCNC: 26 MMOL/L
CREAT SERPL-MCNC: 0.98 MG/DL
EOSINOPHIL # BLD AUTO: 1.92 K/UL
EOSINOPHIL NFR BLD AUTO: 19.2 %
GLUCOSE SERPL-MCNC: 101 MG/DL
HCT VFR BLD CALC: 42.2 %
HGB BLD-MCNC: 14.7 G/DL
IMM GRANULOCYTES NFR BLD AUTO: 0.1 %
LYMPHOCYTES # BLD AUTO: 1.26 K/UL
LYMPHOCYTES NFR BLD AUTO: 12.6 %
MAN DIFF?: NORMAL
MCHC RBC-ENTMCNC: 29.3 PG
MCHC RBC-ENTMCNC: 34.8 GM/DL
MCV RBC AUTO: 84.2 FL
MONOCYTES # BLD AUTO: 0.69 K/UL
MONOCYTES NFR BLD AUTO: 6.9 %
NEUTROPHILS # BLD AUTO: 6.08 K/UL
NEUTROPHILS NFR BLD AUTO: 60.9 %
PLATELET # BLD AUTO: 224 K/UL
POTASSIUM SERPL-SCNC: 4.5 MMOL/L
PROT SERPL-MCNC: 8.9 G/DL
RBC # BLD: 5.01 M/UL
RBC # FLD: 13.3 %
SODIUM SERPL-SCNC: 140 MMOL/L
WBC # FLD AUTO: 9.99 K/UL

## 2018-03-26 ENCOUNTER — APPOINTMENT (OUTPATIENT)
Dept: PULMONOLOGY | Facility: CLINIC | Age: 28
End: 2018-03-26

## 2018-03-30 ENCOUNTER — MEDICATION RENEWAL (OUTPATIENT)
Age: 28
End: 2018-03-30

## 2018-04-05 LAB — ACID FAST STN SPT: SIGNIFICANT CHANGE UP

## 2018-04-18 ENCOUNTER — APPOINTMENT (OUTPATIENT)
Dept: PULMONOLOGY | Facility: CLINIC | Age: 28
End: 2018-04-18

## 2018-04-19 LAB — BACTERIA SPT CF RESP CULT: ABNORMAL

## 2018-05-02 ENCOUNTER — APPOINTMENT (OUTPATIENT)
Dept: PULMONOLOGY | Facility: CLINIC | Age: 28
End: 2018-05-02

## 2018-05-03 ENCOUNTER — RESULT REVIEW (OUTPATIENT)
Age: 28
End: 2018-05-03

## 2018-05-03 LAB — ACID FAST STN SPT: NORMAL

## 2018-05-16 ENCOUNTER — APPOINTMENT (OUTPATIENT)
Dept: PULMONOLOGY | Facility: CLINIC | Age: 28
End: 2018-05-16

## 2018-05-16 VITALS
DIASTOLIC BLOOD PRESSURE: 84 MMHG | HEART RATE: 70 BPM | BODY MASS INDEX: 23.13 KG/M2 | RESPIRATION RATE: 18 BRPM | WEIGHT: 139 LBS | TEMPERATURE: 97.6 F | OXYGEN SATURATION: 98 % | SYSTOLIC BLOOD PRESSURE: 120 MMHG

## 2018-06-13 ENCOUNTER — APPOINTMENT (OUTPATIENT)
Dept: PULMONOLOGY | Facility: CLINIC | Age: 28
End: 2018-06-13

## 2018-06-13 VITALS
DIASTOLIC BLOOD PRESSURE: 74 MMHG | WEIGHT: 138.4 LBS | HEART RATE: 82 BPM | TEMPERATURE: 97.1 F | OXYGEN SATURATION: 99 % | RESPIRATION RATE: 20 BRPM | BODY MASS INDEX: 23.03 KG/M2 | SYSTOLIC BLOOD PRESSURE: 108 MMHG

## 2018-06-13 LAB
ALBUMIN SERPL ELPH-MCNC: 4.2 G/DL
ALP BLD-CCNC: 92 U/L
ALT SERPL-CCNC: 29 U/L
ANION GAP SERPL CALC-SCNC: 14 MMOL/L
AST SERPL-CCNC: 23 U/L
BILIRUB SERPL-MCNC: 0.7 MG/DL
BUN SERPL-MCNC: 20 MG/DL
CALCIUM SERPL-MCNC: 9.5 MG/DL
CHLORIDE SERPL-SCNC: 101 MMOL/L
CO2 SERPL-SCNC: 22 MMOL/L
CREAT SERPL-MCNC: 1.02 MG/DL
GLUCOSE SERPL-MCNC: 259 MG/DL
POTASSIUM SERPL-SCNC: 5.2 MMOL/L
PROT SERPL-MCNC: 7.6 G/DL
SODIUM SERPL-SCNC: 137 MMOL/L

## 2018-07-11 ENCOUNTER — APPOINTMENT (OUTPATIENT)
Dept: PULMONOLOGY | Facility: CLINIC | Age: 28
End: 2018-07-11
Payer: SUBSIDIZED

## 2018-07-11 PROCEDURE — 36415 COLL VENOUS BLD VENIPUNCTURE: CPT

## 2018-07-11 PROCEDURE — 93000 ELECTROCARDIOGRAM COMPLETE: CPT

## 2018-07-11 PROCEDURE — 94010 BREATHING CAPACITY TEST: CPT

## 2018-07-11 PROCEDURE — 99215 OFFICE O/P EST HI 40 MIN: CPT | Mod: 25

## 2018-08-07 ENCOUNTER — LABORATORY RESULT (OUTPATIENT)
Age: 28
End: 2018-08-07

## 2018-08-07 ENCOUNTER — APPOINTMENT (OUTPATIENT)
Dept: PULMONOLOGY | Facility: CLINIC | Age: 28
End: 2018-08-07
Payer: SUBSIDIZED

## 2018-08-07 PROCEDURE — 94010 BREATHING CAPACITY TEST: CPT

## 2018-08-07 PROCEDURE — 99215 OFFICE O/P EST HI 40 MIN: CPT | Mod: 25

## 2018-08-07 PROCEDURE — 93000 ELECTROCARDIOGRAM COMPLETE: CPT

## 2018-08-07 RX ORDER — AZTREONAM 75 MG/ML
75 KIT INHALATION
Qty: 1 | Refills: 5 | Status: DISCONTINUED | COMMUNITY
Start: 2017-04-19 | End: 2018-08-07

## 2018-08-07 RX ORDER — TOBRAMYCIN 300 MG/5ML
300 SOLUTION RESPIRATORY (INHALATION)
Qty: 1 | Refills: 5 | Status: DISCONTINUED | COMMUNITY
Start: 2017-05-22 | End: 2018-08-07

## 2018-08-13 ENCOUNTER — RESULT REVIEW (OUTPATIENT)
Age: 28
End: 2018-08-13

## 2018-08-13 LAB
25(OH)D3 SERPL-MCNC: 31.6 NG/ML
A FLAVUS AB FLD QL: NEGATIVE
A FUMIGATUS AB FLD QL: NEGATIVE
A FUMIGATUS IGE QN: 0.14 KUA/L
A NIGER AB FLD QL: NEGATIVE
A-TOCOPHEROL VIT E SERPL-MCNC: 5.8 MG/L
ALBUMIN SERPL ELPH-MCNC: 4.5 G/DL
ALP BLD-CCNC: 111 U/L
ALT SERPL-CCNC: 37 U/L
ANION GAP SERPL CALC-SCNC: 13 MMOL/L
APPEARANCE: CLEAR
AST SERPL-CCNC: 34 U/L
BACTERIA SPT CF RESP CULT: ABNORMAL
BASOPHILS # BLD AUTO: 0.02 K/UL
BASOPHILS NFR BLD AUTO: 0.4 %
BETA+GAMMA TOCOPHEROL SERPL-MCNC: 1.1 MG/L
BILIRUB SERPL-MCNC: 0.6 MG/DL
BILIRUBIN URINE: NEGATIVE
BLOOD URINE: NEGATIVE
BUN SERPL-MCNC: 14 MG/DL
CALCIUM SERPL-MCNC: 9.4 MG/DL
CHLORIDE SERPL-SCNC: 100 MMOL/L
CO2 SERPL-SCNC: 24 MMOL/L
COLOR: YELLOW
CREAT SERPL-MCNC: 0.92 MG/DL
CREAT SPEC-SCNC: 166 MG/DL
DEPRECATED A FUMIGATUS IGE RAST QL: NORMAL
EOSINOPHIL # BLD AUTO: 0.13 K/UL
EOSINOPHIL NFR BLD AUTO: 2.4 %
GLUCOSE QUALITATIVE U: NEGATIVE MG/DL
GLUCOSE SERPL-MCNC: 107 MG/DL
HBA1C MFR BLD HPLC: 6.8 %
HCT VFR BLD CALC: 42.4 %
HGB BLD-MCNC: 14.5 G/DL
IMM GRANULOCYTES NFR BLD AUTO: 0 %
KETONES URINE: NEGATIVE
LEUKOCYTE ESTERASE URINE: NEGATIVE
LYMPHOCYTES # BLD AUTO: 1.58 K/UL
LYMPHOCYTES NFR BLD AUTO: 28.6 %
MAN DIFF?: NORMAL
MCHC RBC-ENTMCNC: 30.4 PG
MCHC RBC-ENTMCNC: 34.2 GM/DL
MCV RBC AUTO: 88.9 FL
MICROALBUMIN 24H UR DL<=1MG/L-MCNC: <1.2 MG/DL
MICROALBUMIN/CREAT 24H UR-RTO: NORMAL
MONOCYTES # BLD AUTO: 0.45 K/UL
MONOCYTES NFR BLD AUTO: 8.1 %
NEUTROPHILS # BLD AUTO: 3.35 K/UL
NEUTROPHILS NFR BLD AUTO: 60.5 %
NITRITE URINE: NEGATIVE
PH URINE: 5
PLATELET # BLD AUTO: 249 K/UL
POTASSIUM SERPL-SCNC: 4.6 MMOL/L
PROT SERPL-MCNC: 8.2 G/DL
PROTEIN URINE: NEGATIVE MG/DL
RBC # BLD: 4.77 M/UL
RBC # FLD: 13.8 %
SODIUM SERPL-SCNC: 137 MMOL/L
SPECIFIC GRAVITY URINE: 1.02
TOTAL IGE SMQN RAST: 26 KU/L
UROBILINOGEN URINE: NEGATIVE MG/DL
VIT A SERPL-MCNC: 28.3 UG/DL
WBC # FLD AUTO: 5.53 K/UL

## 2018-08-14 ENCOUNTER — OTHER (OUTPATIENT)
Age: 28
End: 2018-08-14

## 2018-08-16 VITALS
RESPIRATION RATE: 18 BRPM | DIASTOLIC BLOOD PRESSURE: 78 MMHG | SYSTOLIC BLOOD PRESSURE: 118 MMHG | WEIGHT: 138.8 LBS | OXYGEN SATURATION: 98 % | TEMPERATURE: 97.4 F | HEART RATE: 76 BPM | BODY MASS INDEX: 23.1 KG/M2

## 2018-08-20 ENCOUNTER — OTHER (OUTPATIENT)
Age: 28
End: 2018-08-20

## 2018-08-20 LAB — MENADIONE SERPL-MCNC: 1.31 NG/ML

## 2018-08-24 ENCOUNTER — MEDICATION RENEWAL (OUTPATIENT)
Age: 28
End: 2018-08-24

## 2018-09-28 ENCOUNTER — MEDICATION RENEWAL (OUTPATIENT)
Age: 28
End: 2018-09-28

## 2018-09-28 ENCOUNTER — RESULT REVIEW (OUTPATIENT)
Age: 28
End: 2018-09-28

## 2018-10-03 ENCOUNTER — APPOINTMENT (OUTPATIENT)
Dept: PULMONOLOGY | Facility: CLINIC | Age: 28
End: 2018-10-03
Payer: SUBSIDIZED

## 2018-10-03 VITALS
DIASTOLIC BLOOD PRESSURE: 78 MMHG | WEIGHT: 138.2 LBS | HEART RATE: 81 BPM | OXYGEN SATURATION: 99 % | RESPIRATION RATE: 18 BRPM | BODY MASS INDEX: 23 KG/M2 | TEMPERATURE: 97.7 F | SYSTOLIC BLOOD PRESSURE: 124 MMHG

## 2018-10-03 PROCEDURE — 94010 BREATHING CAPACITY TEST: CPT

## 2018-10-03 PROCEDURE — 93000 ELECTROCARDIOGRAM COMPLETE: CPT

## 2018-10-03 PROCEDURE — 99215 OFFICE O/P EST HI 40 MIN: CPT | Mod: 25

## 2018-10-03 PROCEDURE — 36415 COLL VENOUS BLD VENIPUNCTURE: CPT

## 2018-10-03 RX ORDER — CHOLECALCIFEROL (VITAMIN D3) 1250 MCG
1.25 MG CAPSULE ORAL
Qty: 4 | Refills: 1 | Status: DISCONTINUED | COMMUNITY
Start: 2017-11-20 | End: 2018-10-03

## 2018-10-09 ENCOUNTER — APPOINTMENT (OUTPATIENT)
Dept: PULMONOLOGY | Facility: CLINIC | Age: 28
End: 2018-10-09

## 2018-10-16 ENCOUNTER — APPOINTMENT (OUTPATIENT)
Dept: PULMONOLOGY | Facility: CLINIC | Age: 28
End: 2018-10-16

## 2018-10-30 ENCOUNTER — APPOINTMENT (OUTPATIENT)
Dept: PULMONOLOGY | Facility: CLINIC | Age: 28
End: 2018-10-30

## 2018-11-02 ENCOUNTER — APPOINTMENT (OUTPATIENT)
Dept: PULMONOLOGY | Facility: CLINIC | Age: 28
End: 2018-11-02

## 2018-11-06 ENCOUNTER — MEDICATION RENEWAL (OUTPATIENT)
Age: 28
End: 2018-11-06

## 2018-11-21 VITALS
OXYGEN SATURATION: 98 % | RESPIRATION RATE: 17 BRPM | WEIGHT: 136.6 LBS | TEMPERATURE: 97 F | BODY MASS INDEX: 22.73 KG/M2 | SYSTOLIC BLOOD PRESSURE: 133 MMHG | HEART RATE: 70 BPM | DIASTOLIC BLOOD PRESSURE: 90 MMHG

## 2018-11-28 ENCOUNTER — APPOINTMENT (OUTPATIENT)
Dept: PULMONOLOGY | Facility: CLINIC | Age: 28
End: 2018-11-28

## 2018-11-28 VITALS
OXYGEN SATURATION: 99 % | BODY MASS INDEX: 23.22 KG/M2 | SYSTOLIC BLOOD PRESSURE: 126 MMHG | HEART RATE: 73 BPM | WEIGHT: 139.4 LBS | RESPIRATION RATE: 16 BRPM | TEMPERATURE: 97.4 F | DIASTOLIC BLOOD PRESSURE: 84 MMHG | HEIGHT: 65 IN

## 2019-01-23 ENCOUNTER — APPOINTMENT (OUTPATIENT)
Dept: PULMONOLOGY | Facility: CLINIC | Age: 29
End: 2019-01-23

## 2019-02-06 ENCOUNTER — MEDICATION RENEWAL (OUTPATIENT)
Age: 29
End: 2019-02-06

## 2019-02-15 ENCOUNTER — OTHER (OUTPATIENT)
Age: 29
End: 2019-02-15

## 2019-02-19 ENCOUNTER — OTHER (OUTPATIENT)
Age: 29
End: 2019-02-19

## 2019-02-19 ENCOUNTER — MEDICATION RENEWAL (OUTPATIENT)
Age: 29
End: 2019-02-19

## 2019-02-27 ENCOUNTER — MEDICATION RENEWAL (OUTPATIENT)
Age: 29
End: 2019-02-27

## 2019-03-15 ENCOUNTER — APPOINTMENT (OUTPATIENT)
Dept: PULMONOLOGY | Facility: CLINIC | Age: 29
End: 2019-03-15

## 2019-03-19 ENCOUNTER — APPOINTMENT (OUTPATIENT)
Dept: PULMONOLOGY | Facility: CLINIC | Age: 29
End: 2019-03-19

## 2019-03-19 VITALS
WEIGHT: 139.4 LBS | DIASTOLIC BLOOD PRESSURE: 67 MMHG | SYSTOLIC BLOOD PRESSURE: 134 MMHG | BODY MASS INDEX: 23.22 KG/M2 | TEMPERATURE: 98.2 F | OXYGEN SATURATION: 99 % | RESPIRATION RATE: 18 BRPM | HEART RATE: 65 BPM | HEIGHT: 65 IN

## 2019-03-27 NOTE — HISTORY OF PRESENT ILLNESS
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 [Pseudomonas] : Pseudomonas [Other: ___] : [unfilled] [___] : the last positive Pseudomonas result was [unfilled] [Last AFB Date: ___] : the AFB was performed  [unfilled] [None] : ~He/She~ has no hemoptysis [FVC ___] : the FVC was [unfilled] liters [FEV1: ___] : the FEV1 was [unfilled] liters [] : vest daily [Fair] : fair [Pancreatic Insufficiency] : pancreatic insufficiency [Pancreatic Enzyme Supp.] : uses pancreatic enzyme supplements [Date: ___] : The last HgA1C was performed on [unfilled] [CFRD] : CFRD [HgA1C Value: ___] : HgA1C value was [unfilled]  [Other ___] : exercise [unfilled] [AFB] : the patient had a MAC negative AFB [Headache] : no headache [Congestion] : no nasal congestion [Sinus Pain] : no sinus pain [Pancreatitis] : no pancreatitis [Diarrhea] : no diarrhea [Constipation] : no constipation [Steatorrhea] : no steatorrhea [de-identified] : This is a 27 y/o male CF pt who was dx'ed CF at 6 mo old, + sweat, + 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 Pt also has chronic sinusitis and PI. Dx'ed CFRD recently, pt last saw Endo Sept 2017 and currently not on chronic insulin and  prescribed insulin during most recent pulmonary exacerbation.\par Bone Density 11/28/16' normal T score-1.3\par \par Pt is engaged to his long term girlfriend, planning for wedding next October 2019.\par \par Here for Week 24 visit  of the [id number] open label trial\par Pt is reporting feeling very well today.  Reports coughing minimally every 1-2 weeks with scant amount of sputum production every 2 weeks.  This is his new baseline since starting study drug.\par denies PIKE\par Denies wheezing/tightness/CP/fever/chills/GI symptoms\par Denies any changes to his con meds or AEs since last study visit\par \par Pt reports he has been using his nebulizer treatments, Albuterol, Pulmozyme and HS twice weekly.  He started exercising 3 x/week and will use the vest 1-2 x/week on days that he does not exercise.\par \par CFRD: has not been checking FS or f/u with endocrine\par \par Nutrition: Using Creon as ordered, no issue.  Was able to get shipment of creon before he ran out and did not miss any doses.  No GI complaints today.  weight stable.\par \par Patient is on the following: \par Morning: VX-659 (240 mg), tezacaftor (100mg) and ivacaftor (150 mg) / Evening: ivacaftor (150 mg)\par \par \par

## 2019-03-27 NOTE — REVIEW OF SYSTEMS
[see HPI] : see HPI [Negative] : Heme/Lymph [Shortness Of Breath] : no shortness of breath [Cough] : no cough [Wheezing] : no wheezing [SOB on Exertion] : no shortness of breath during exertion [PND] : no PND

## 2019-03-27 NOTE — PHYSICAL EXAM
[General Appearance - Well Developed] : well developed [Normal Appearance] : normal appearance [Well Groomed] : well groomed [Normal Conjunctiva] : the conjunctiva exhibited no abnormalities [General Appearance - In No Acute Distress] : no acute distress [Normal Oral Mucosa] : normal oral mucosa [No Oral Pallor] : no oral pallor [Normal Oropharynx] : normal oropharynx [Neck Appearance] : the appearance of the neck was normal [Neck Cervical Mass (___cm)] : no neck mass was observed [Heart Rate And Rhythm] : heart rate was normal and rhythm regular [Heart Sounds] : normal S1 and S2 [Heart Sounds Gallop] : no gallops [Murmurs] : no murmurs [] : no respiratory distress [Heart Sounds Pericardial Friction Rub] : no pericardial rub [Respiration, Rhythm And Depth] : normal respiratory rhythm and effort [Exaggerated Use Of Accessory Muscles For Inspiration] : no accessory muscle use [Abdomen Soft] : soft [Auscultation Breath Sounds / Voice Sounds] : lungs were clear to auscultation bilaterally [Musculoskeletal - Swelling] : no joint swelling seen [Abdomen Tenderness] : non-tender [Cyanosis, Localized] : no localized cyanosis [Skin Color & Pigmentation] : normal skin color and pigmentation [Motor Exam] : the motor exam was normal [Oriented To Time, Place, And Person] : oriented to person, place, and time [Affect] : the affect was normal [Impaired Insight] : insight and judgment were intact [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

## 2019-03-27 NOTE — ASSESSMENT
[FreeTextEntry1] : This is a 27 y/o male CF pt who was dx'ed CF at 6 mo old, + sweat, + 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 Pt also has chronic sinusitis and PI. Dx'ed CFRD recently, pt last saw Endo in May 2016 and currently not on chronic insulin but is following FS and endocrine prescribed insulin once during most recent pulmonary exacerbation.\par Bone Density 11/28/16' normal T score-1.3\par \par His hearing improved significantly since stopping vanco.  Has not had CT sinus yet.  NEEDS to schedule CT sinuses.\par \par Endocrine - CFRD -  still needs f/u appt with Endo.  Last A1C in 8/2018 was 6.8.  Pt does not check FS but would be interested in the continuos glucose monitor again. \par Pt NEEDS f/u appt with ENDO.  Contacted Dr. Salazar's office to help expidite appt, pt given direct number to call and has an appt with Dr. Salazar on 3/29\par pt instructed to start checking FS in order to have data to bring with him to the appt with Dr. Salazar\par \par Pulmonary:  Feeling very well today, denies cough/sputum.  \par FEV1 today 82% -->  3/26/2018 70% pre-study \par Doing well from pulmonary standpoint. \par \par ACT:\par pt is using Alb, Pulm, HS and Vest 2-3 x/week.  Pt reduced the HS bc he does not feel he has any sputum. Pt encouraged to increase Alb and Pulmozyme back to daily.  He has started exercising 3x/week and alternating with vest on days that he does not exercise.\par \par \par Here for Week 24 appointment of the  [facility] Study Number: [id number] trial\par Morning: VX-659 (240 mg), tezacaftor (100mg) \par and \par ivacaftor (150 mg) / Evening: ivacaftor (150 mg)\par \par \par Subject meets inclusion criteria and does not meet any of the exclusion criteria. Pt tolerated all study procedures well as well as study drug.  Pt would like to continue in the open label [id number].  Next study visit scheduled within required window.\par \par

## 2019-03-29 ENCOUNTER — APPOINTMENT (OUTPATIENT)
Dept: ENDOCRINOLOGY | Facility: CLINIC | Age: 29
End: 2019-03-29
Payer: COMMERCIAL

## 2019-03-29 VITALS
SYSTOLIC BLOOD PRESSURE: 120 MMHG | WEIGHT: 140 LBS | DIASTOLIC BLOOD PRESSURE: 80 MMHG | HEIGHT: 65 IN | BODY MASS INDEX: 23.32 KG/M2

## 2019-03-29 LAB
GLUCOSE BLDC GLUCOMTR-MCNC: 246
HBA1C MFR BLD HPLC: 6.4

## 2019-03-29 PROCEDURE — 95251 CONT GLUC MNTR ANALYSIS I&R: CPT

## 2019-03-29 PROCEDURE — 99215 OFFICE O/P EST HI 40 MIN: CPT

## 2019-03-29 PROCEDURE — 95250 CONT GLUC MNTR PHYS/QHP EQP: CPT

## 2019-03-29 RX ORDER — PEN NEEDLE, DIABETIC 29 G X1/2"
32G X 4 MM NEEDLE, DISPOSABLE MISCELLANEOUS
Qty: 1 | Refills: 1 | Status: DISCONTINUED | COMMUNITY
Start: 2017-06-02 | End: 2019-03-29

## 2019-03-29 RX ORDER — FLUTICASONE PROPIONATE 50 UG/1
50 SPRAY, METERED NASAL TWICE DAILY
Qty: 1 | Refills: 2 | Status: DISCONTINUED | COMMUNITY
Start: 2018-07-11 | End: 2019-03-29

## 2019-03-29 RX ORDER — INSULIN ASPART 100 [IU]/ML
100 INJECTION, SOLUTION INTRAVENOUS; SUBCUTANEOUS
Qty: 1 | Refills: 3 | Status: DISCONTINUED | COMMUNITY
Start: 2017-06-02 | End: 2019-03-29

## 2019-03-29 RX ORDER — OXYMETAZOLINE HYDROCHLORIDE 0.05 G/100ML
0.05 SPRAY NASAL
Qty: 1 | Refills: 0 | Status: DISCONTINUED | COMMUNITY
Start: 2017-06-22 | End: 2019-03-29

## 2019-03-30 RX ORDER — FLASH GLUCOSE SCANNING READER
EACH MISCELLANEOUS
Qty: 1 | Refills: 0 | Status: ACTIVE | COMMUNITY
Start: 2019-03-30 | End: 1900-01-01

## 2019-04-03 NOTE — REASON FOR VISIT
[Initial Eval - Existing Diagnosis] : an initial evaluation of an existing diagnosis [FreeTextEntry1] : CFRD

## 2019-04-03 NOTE — ASSESSMENT
[FreeTextEntry1] : Mr. SOCORRO STEPHEN is 28 year old male here for evaluation of CFRD. \par \par #1 CFRD\par Review of his glucose data showed hyperglycemia postprandially. \par For now will start patient on Novolog 2 units with lunch but will place CMG on patient and he will likely benefit from premeal insulin regimen. 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  \par He might benefit from freestyle yonathan.  He does check FSG 3-4 times daily and may be needed insulin injection 3-4 times daily\par CGM placed today for patient.  He will return in 2 weeks for analyysis.  \par Will order CGM and try to work with insurance coverage, if not covered, patient may be willing to pa out of pocket costs for better glycemic control and monitoring.

## 2019-04-17 ENCOUNTER — CLINICAL ADVICE (OUTPATIENT)
Age: 29
End: 2019-04-17

## 2019-05-13 ENCOUNTER — MEDICATION RENEWAL (OUTPATIENT)
Age: 29
End: 2019-05-13

## 2019-06-03 NOTE — DISCHARGE NOTE ADULT - FUNCTIONAL STATUS DATE
NEURO: Pt AAO x 4. Behavior and speech appropriate to situation.   CARDIAC: Regular rhythm auscultated  RESPIRATORY: Respirations even and unlabored. Breath sounds clear to all lung fields.   MUSCULOSKELETAL: Active ROM noted to extremities    Mild right CVA tenderness, no left sided CVA tenderness.     Surgical scars to bilateral breasts well healed, some islands of eschar noted along scars, no signs of infection noted. No edema, erythema or drainage noted to bilateral surgical scars.    26-Feb-2018

## 2019-06-12 ENCOUNTER — APPOINTMENT (OUTPATIENT)
Dept: PULMONOLOGY | Facility: CLINIC | Age: 29
End: 2019-06-12
Payer: COMMERCIAL

## 2019-06-12 ENCOUNTER — MED ADMIN CHARGE (OUTPATIENT)
Age: 29
End: 2019-06-12

## 2019-06-12 ENCOUNTER — APPOINTMENT (OUTPATIENT)
Dept: PULMONOLOGY | Facility: CLINIC | Age: 29
End: 2019-06-12
Payer: SUBSIDIZED

## 2019-06-12 ENCOUNTER — LABORATORY RESULT (OUTPATIENT)
Age: 29
End: 2019-06-12

## 2019-06-12 VITALS
OXYGEN SATURATION: 99 % | SYSTOLIC BLOOD PRESSURE: 121 MMHG | TEMPERATURE: 97.9 F | HEART RATE: 70 BPM | HEIGHT: 65 IN | DIASTOLIC BLOOD PRESSURE: 77 MMHG | BODY MASS INDEX: 23.32 KG/M2 | RESPIRATION RATE: 18 BRPM | WEIGHT: 140 LBS

## 2019-06-12 PROCEDURE — 99215 OFFICE O/P EST HI 40 MIN: CPT | Mod: 25

## 2019-06-12 PROCEDURE — 90670 PCV13 VACCINE IM: CPT

## 2019-06-12 PROCEDURE — 94010 BREATHING CAPACITY TEST: CPT

## 2019-06-12 PROCEDURE — 36415 COLL VENOUS BLD VENIPUNCTURE: CPT

## 2019-06-12 PROCEDURE — G0009: CPT

## 2019-06-13 LAB
25(OH)D3 SERPL-MCNC: 27.9 NG/ML
ALBUMIN SERPL ELPH-MCNC: 4.6 G/DL
ALP BLD-CCNC: 92 U/L
ALT SERPL-CCNC: 28 U/L
ANION GAP SERPL CALC-SCNC: 12 MMOL/L
APPEARANCE: CLEAR
AST SERPL-CCNC: 21 U/L
BASOPHILS # BLD AUTO: 0.02 K/UL
BASOPHILS NFR BLD AUTO: 0.5 %
BILIRUB SERPL-MCNC: 0.8 MG/DL
BILIRUBIN URINE: NEGATIVE
BLOOD URINE: NEGATIVE
BUN SERPL-MCNC: 17 MG/DL
CALCIUM SERPL-MCNC: 9.3 MG/DL
CHLORIDE SERPL-SCNC: 97 MMOL/L
CO2 SERPL-SCNC: 27 MMOL/L
COLOR: YELLOW
CREAT SERPL-MCNC: 0.89 MG/DL
CREAT SPEC-SCNC: 129 MG/DL
EOSINOPHIL # BLD AUTO: 0.04 K/UL
EOSINOPHIL NFR BLD AUTO: 1.1 %
ESTIMATED AVERAGE GLUCOSE: 134 MG/DL
GLUCOSE QUALITATIVE U: ABNORMAL
GLUCOSE SERPL-MCNC: 276 MG/DL
HBA1C MFR BLD HPLC: 6.3 %
HCT VFR BLD CALC: 42.1 %
HGB BLD-MCNC: 14.9 G/DL
IMM GRANULOCYTES NFR BLD AUTO: 0.3 %
KETONES URINE: NEGATIVE
LEUKOCYTE ESTERASE URINE: NEGATIVE
LYMPHOCYTES # BLD AUTO: 1.12 K/UL
LYMPHOCYTES NFR BLD AUTO: 30.4 %
MAN DIFF?: NORMAL
MCHC RBC-ENTMCNC: 30.7 PG
MCHC RBC-ENTMCNC: 35.4 GM/DL
MCV RBC AUTO: 86.6 FL
MICROALBUMIN 24H UR DL<=1MG/L-MCNC: <1.2 MG/DL
MICROALBUMIN/CREAT 24H UR-RTO: NORMAL MG/G
MONOCYTES # BLD AUTO: 0.32 K/UL
MONOCYTES NFR BLD AUTO: 8.7 %
NEUTROPHILS # BLD AUTO: 2.18 K/UL
NEUTROPHILS NFR BLD AUTO: 59 %
NITRITE URINE: NEGATIVE
PH URINE: 5.5
PLATELET # BLD AUTO: 218 K/UL
POTASSIUM SERPL-SCNC: 4.5 MMOL/L
PROT SERPL-MCNC: 7.5 G/DL
PROTEIN URINE: NEGATIVE
RBC # BLD: 4.86 M/UL
RBC # FLD: 12.5 %
SODIUM SERPL-SCNC: 136 MMOL/L
SPECIFIC GRAVITY URINE: 1.03
UROBILINOGEN URINE: NORMAL
WBC # FLD AUTO: 3.69 K/UL

## 2019-06-14 NOTE — REVIEW OF SYSTEMS
[see HPI] : see HPI [Negative] : Heme/Lymph [Cough] : cough [Shortness Of Breath] : no shortness of breath [Wheezing] : no wheezing [SOB on Exertion] : no shortness of breath during exertion [PND] : no PND

## 2019-06-14 NOTE — ASSESSMENT
[FreeTextEntry1] : This is a 29 y/o male CF pt who was dx'ed CF at 6 mo old, + sweat, + 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 Pt also has chronic sinusitis and PI. Dx'ed CFRD recently, pt last saw Endo in March 2019 and prescribed Novolog AC.\par Bone Density 11/28/16' normal T score-1.3\par \par His hearing improved significantly since stopping vanco.  Has not had CT sinus yet. Last CT sinus concerning for active infection/inflammation in 2017. Denies symptoms currently. Recommend f/u with ENT for further evaluation.\par \par Endocrine - CFRD -  Recently seen by Abimael and started on Novolog 2units AC. Provided with CGM and endorsing FS still out of goal of 100-140. Mostly elevated postprandial in evening. one hypoglycemic reading. Endorsing difficult to always provide insulin with meals. Last A1C 6.4. \par Needs to f/u with Dr. Salazar to discuss most recent readings for further recommendations\par \par Pulmonary:  Feeling very well today, denies cough/sputum.  \par FEV1 today (6/12/19) 84% (3.20) from 82% -->  3/26/2018 70% pre-study \par Doing well from pulmonary standpoint. \par \par ACT:\par pt is using Alb, Pulm, HS and Vest 2-3 x/week.  Pt reduced the HS bc he does not feel he has any sputum. Pt encouraged to increase Alb and Pulmozyme back to daily.  He has started exercising 3x/week and alternating with vest on days that he does not exercise.\par \par GI - pancreatic insufficient. denies steatorrhea. continue PERT.\par \par Nutrition - Weight- endorsing difficulty gaining muscle mass despite exercise regimen. Weight stable, but interested in hormone level testing and additional supplements. Will have RD reach out to patient to discuss nutritional supplements. Requested pt to discuss hormone testing with DR. Salazar. \par \par Sleep - prn ambien 2 x per week. SOL 15 minutes\par \par Social - pt is getting  in October. Fiancee present today. \par \par Health Maintenance\par Flu vaccine up to date\par Prevnar 13 administered today\par Pneumococcal 23 to be administered in 1 year\par BD up to date repeat in 5 years\par CXR lsat 2018 provided with script today\par annual labs drawn today\par Repeat sputum cx today\par \par Subject is here for the Week 36 visit of the [id number] open label study. All study procedures were tolerated. All used and unused study drug was returned and accounted for. Additionally, subject was informed about upcoming study changes, as a result of [employer] selecting the 445 compound.  will keep the subject informed as more information about the new open label study is received. Subject was dosed in clinic without any issues. He would like to continue with the study and the next phone calls and in-person visits have been scheduled. \par \par Subject meets inclusion criteria and does not meet any of the exclusion criteria. Pt tolerated all study procedures well as well as study drug.  Pt would like to continue in the open label [id number].  Next study visit scheduled within required window.

## 2019-06-14 NOTE — PHYSICAL EXAM
[General Appearance - Well Developed] : well developed [Normal Appearance] : normal appearance [Well Groomed] : well groomed [General Appearance - In No Acute Distress] : no acute distress [Normal Conjunctiva] : the conjunctiva exhibited no abnormalities [Normal Oral Mucosa] : normal oral mucosa [No Oral Pallor] : no oral pallor [Normal Oropharynx] : normal oropharynx [Neck Appearance] : the appearance of the neck was normal [Neck Cervical Mass (___cm)] : no neck mass was observed [Heart Rate And Rhythm] : heart rate was normal and rhythm regular [Heart Sounds] : normal S1 and S2 [Heart Sounds Gallop] : no gallops [Murmurs] : no murmurs [Heart Sounds Pericardial Friction Rub] : no pericardial rub [] : no respiratory distress [Respiration, Rhythm And Depth] : normal respiratory rhythm and effort [Exaggerated Use Of Accessory Muscles For Inspiration] : no accessory muscle use [Auscultation Breath Sounds / Voice Sounds] : lungs were clear to auscultation bilaterally [Abdomen Soft] : soft [Abdomen Tenderness] : non-tender [Musculoskeletal - Swelling] : no joint swelling seen [Cyanosis, Localized] : no localized cyanosis [Skin Color & Pigmentation] : normal skin color and pigmentation [Motor Exam] : the motor exam was normal [Oriented To Time, Place, And Person] : oriented to person, place, and time [Impaired Insight] : insight and judgment were intact [Affect] : the affect was normal [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

## 2019-06-14 NOTE — HISTORY OF PRESENT ILLNESS
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 [Pseudomonas] : Pseudomonas [Other: ___] : [unfilled] [___] : the last positive Pseudomonas result was [unfilled] [Last AFB Date: ___] : the AFB was performed  [unfilled] [None] : ~He/She~ has no hemoptysis [FVC ___] : the FVC was [unfilled] liters [FEV1: ___] : the FEV1 was [unfilled] liters [] : vest daily [Other ___] : exercise [unfilled] [Fair] : fair [Pancreatic Insufficiency] : pancreatic insufficiency [Pancreatic Enzyme Supp.] : uses pancreatic enzyme supplements [CFRD] : CFRD [Date: ___] : The last HgA1C was performed on [unfilled] [HgA1C Value: ___] : HgA1C value was [unfilled]  [AFB] : the patient had a MAC negative AFB [Congestion] : no nasal congestion [Headache] : no headache [Sinus Pain] : no sinus pain [Pancreatitis] : no pancreatitis [Diarrhea] : no diarrhea [Constipation] : no constipation [Steatorrhea] : no steatorrhea [de-identified] : This is a 29 y/o male CF pt who was dx'ed CF at 6 mo old, + sweat, + 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 Pt also has chronic sinusitis and PI. Dx'ed CFRD recently, pt last saw Endo March 2019 and started Novlog AC 2units.\par Bone Density 11/28/16' normal T score-1.3\par \par Pt is engaged to his long term girlfriend, planning for wedding next October 2019.\par \par Here for Week 36 visit  of the [id number] open label trial\par Pt is reporting feeling very well today.  Reports coughing minimally every 1-2 weeks with scant amount of sputum production every 2 weeks.  This is his new baseline since starting study drug.\par denies PIKE\par Denies wheezing/tightness/CP/fever/chills/GI symptoms\par Denies any changes to his con meds or AEs since last study visit\par \par Pt reports he has been using his nebulizer treatments, Albuterol, Pulmozyme and HS twice weekly.  He started exercising 3 x/week and will use the vest 1-2 x/week on days that he does not exercise.\par \par CFRD: recently seen by Endocrine A1C 6.4. Provided with CGM and endorsing FS still elevated about goal of 100-140. Is compliant with insulin therapy 2units AC novolog, but due to work may forget to take insulin at every meal. \par \par Nutrition: Using Creon as ordered, no issue.  No GI complaints today.  weight stable.\par \par Sleep-DIS using Ambien 5mg 2x/week and benefitting. Endorsing 15-30 minutes with Ambien to intitate sleep. \par \par ENT: denies sinus congestion/PND/HA. Endorsing dry since being on study drug. Hasn't f/u with ENT, or repeated CT sinus\par Patient is on the following: \par Morning: VX-659 (240 mg), tezacaftor (100mg) and ivacaftor (150 mg) / Evening: ivacaftor (150 mg)

## 2019-06-17 LAB
A FUMIGATUS IGE QN: 0.12 KUA/L
A-TOCOPHEROL VIT E SERPL-MCNC: 7.1 MG/L
BETA+GAMMA TOCOPHEROL SERPL-MCNC: 1.3 MG/L
DEPRECATED A FUMIGATUS IGE RAST QL: NORMAL
TOTAL IGE SMQN RAST: 20 KU/L
VIT A SERPL-MCNC: 30.1 UG/DL

## 2019-06-18 LAB
A FLAVUS AB FLD QL: NEGATIVE
A FUMIGATUS AB FLD QL: NEGATIVE
A NIGER AB FLD QL: NEGATIVE
ASPERGILLUS FLAVUS PRECIPITINS: NEGATIVE
ASPERGILLUS FUMIGATES PRECIPTINS: NEGATIVE
ASPERGILLUS NIGER PRECIPITINS: NEGATIVE

## 2019-06-20 LAB — MENADIONE SERPL-MCNC: <.13 NG/ML

## 2019-06-26 ENCOUNTER — MEDICATION RENEWAL (OUTPATIENT)
Age: 29
End: 2019-06-26

## 2019-07-11 ENCOUNTER — APPOINTMENT (OUTPATIENT)
Dept: PULMONOLOGY | Facility: CLINIC | Age: 29
End: 2019-07-11
Payer: COMMERCIAL

## 2019-07-11 VITALS
RESPIRATION RATE: 18 BRPM | HEART RATE: 75 BPM | SYSTOLIC BLOOD PRESSURE: 128 MMHG | HEIGHT: 65 IN | BODY MASS INDEX: 23.49 KG/M2 | WEIGHT: 141 LBS | DIASTOLIC BLOOD PRESSURE: 83 MMHG | OXYGEN SATURATION: 98 % | TEMPERATURE: 97.7 F

## 2019-07-11 PROCEDURE — ZZZZZ: CPT

## 2019-07-11 PROCEDURE — 94010 BREATHING CAPACITY TEST: CPT

## 2019-07-11 PROCEDURE — 99215 OFFICE O/P EST HI 40 MIN: CPT | Mod: 25

## 2019-07-11 RX ORDER — PREDNISONE 20 MG/1
20 TABLET ORAL
Qty: 5 | Refills: 0 | Status: DISCONTINUED | COMMUNITY
Start: 2019-06-26 | End: 2019-07-11

## 2019-07-12 NOTE — ASSESSMENT
[FreeTextEntry1] : This is a 27 y/o male CF pt who was dx'ed CF at 6 mo old, + sweat, + 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 Pt also has chronic sinusitis and PI. Dx'ed CFRD recently, pt last saw Endo in March 2019 and prescribed Novolog AC.\par Bone Density 11/28/16' normal T score-1.3\par \par His hearing improved significantly since stopping vanco.  Has not had CT sinus yet. Last CT sinus concerning for active infection/inflammation in 2017. Denies symptoms currently. Recommend f/u with ENT for further evaluation.\par \par Endocrine - CFRD - ON Novolog 2units AC. Provided with CGM. Recently not wearing CGM or checking FS. Still administering insulin. \par Needs to f/u with Dr. Salazar. Is aware of implications and will schedule f/u and resume CGM\par \par Pulmonary: S/p about 2 weeks of Bactrim and 2 days of Prednisone. Endorsing improvement in symptoms. Almost back to baseline cough/sputum production. \par FEV1 today 84% from (6/12/19) 84% (3.20) from 82% -->  3/26/2018 70% pre-study \par Doing well from pulmonary standpoint. \par \par ACT:\par pt is using Alb, Pulm, HS and Vest 2-3 x/week.  Pt reduced the HS bc he does not feel he has any sputum. Didn't increase ACT. Continue to reinforce daily/BID ACT.  He has started exercising 3x/week and alternating with vest on days that he does not exercise.\par \par GI - pancreatic insufficient. denies steatorrhea. continue PERT. Recommend probiotics with antibiotics\par \par Nutrition - Weight- endorsing difficulty gaining muscle mass despite exercise regimen. Weight stable, but interested in hormone level testing and additional supplements. Needs to f/u hormone levels at next Endo apt. \par \par Sleep - prn ambien 2 x per week. SOL 15 minutes\par \par Social - pt is getting  in October. \par \par Health Maintenance\par Flu vaccine up to date\par Pneumococcal 23 to be administered in 2020\par BD up to date repeat in 5 years\par CXR lsat 2018 provided with script still needs to schedule\par annual labs drawn done\par Repeat sputum cx today, previous cancelled.\par \par f/u quarter or sooner if needed\par

## 2019-07-12 NOTE — PHYSICAL EXAM
[General Appearance - Well Developed] : well developed [Normal Appearance] : normal appearance [Well Groomed] : well groomed [General Appearance - In No Acute Distress] : no acute distress [Normal Conjunctiva] : the conjunctiva exhibited no abnormalities [Normal Oral Mucosa] : normal oral mucosa [No Oral Pallor] : no oral pallor [Normal Oropharynx] : normal oropharynx [Neck Appearance] : the appearance of the neck was normal [Neck Cervical Mass (___cm)] : no neck mass was observed [Heart Rate And Rhythm] : heart rate was normal and rhythm regular [Heart Sounds] : normal S1 and S2 [Heart Sounds Gallop] : no gallops [Murmurs] : no murmurs [Heart Sounds Pericardial Friction Rub] : no pericardial rub [] : no respiratory distress [Respiration, Rhythm And Depth] : normal respiratory rhythm and effort [Exaggerated Use Of Accessory Muscles For Inspiration] : no accessory muscle use [Auscultation Breath Sounds / Voice Sounds] : lungs were clear to auscultation bilaterally [Abdomen Soft] : soft [Abdomen Tenderness] : non-tender [Musculoskeletal - Swelling] : no joint swelling seen [Cyanosis, Localized] : no localized cyanosis [Skin Color & Pigmentation] : normal skin color and pigmentation [Motor Exam] : the motor exam was normal [Oriented To Time, Place, And Person] : oriented to person, place, and time [Impaired Insight] : insight and judgment were intact [Affect] : the affect was normal [FreeTextEntry1] : fingernail clubbing noted

## 2019-07-12 NOTE — REVIEW OF SYSTEMS
[Cough] : cough [Negative] : Heme/Lymph [see HPI] : see HPI [Shortness Of Breath] : no shortness of breath [Wheezing] : no wheezing [SOB on Exertion] : no shortness of breath during exertion [PND] : no PND

## 2019-07-12 NOTE — HISTORY OF PRESENT ILLNESS
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 [Pseudomonas] : Pseudomonas [Other: ___] : [unfilled] [___] : the last positive Pseudomonas result was [unfilled] [Last AFB Date: ___] : the AFB was performed  [unfilled] [None] : ~He/She~ has no hemoptysis [FVC ___] : the FVC was [unfilled] liters [FEV1: ___] : the FEV1 was [unfilled] liters [] : vest daily [Other ___] : exercise [unfilled] [Fair] : fair [Pancreatic Insufficiency] : pancreatic insufficiency [Pancreatic Enzyme Supp.] : uses pancreatic enzyme supplements [CFRD] : CFRD [Date: ___] : The last HgA1C was performed on [unfilled] [HgA1C Value: ___] : HgA1C value was [unfilled]  [Congestion] : no nasal congestion [AFB] : the patient had a MAC negative AFB [Headache] : no headache [Sinus Pain] : no sinus pain [Pancreatitis] : no pancreatitis [Constipation] : no constipation [Diarrhea] : no diarrhea [Steatorrhea] : no steatorrhea [de-identified] : This is a 27 y/o male CF pt who was dx'ed CF at 6 mo old, + sweat, + 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 Pt also has chronic sinusitis and PI. Dx'ed CFRD recently, pt last saw Endo March 2019 and started Novlog AC 2units.\par Bone Density 11/28/16' normal T score-1.3\par \par Pt is engaged to his long term girlfriend, planning for wedding next October 2019.\par \par Here for f/u s/p PO antibiotics. Currently on week 3 of Bactrim. Prescribed Prednisone 5 days course, but only took 2 doses as stating not needing. \par Endorsing improvement in symptoms, but cough and sputum production slightly increased from baseline. \par denies PIKE\par Denies wheezing/tightness/CP/fever/chills/GI symptoms\par \par Pt reports he has been using his nebulizer treatments, Albuterol, Pulmozyme and HS twice weekly.  He started exercising 3 x/week and will use the vest 1-2 x/week on days that he does not exercise. Didn't increase ACT during exacerbation\par \par CFRD: Last A1C 6.3. Previously provided with CGM but not currently wearing. Endorsing not checking FS recently but still using insulin 2units AC novolog, but due to work may forget to take insulin at every meal. Still needs to schedule f/u\par \par Nutrition: Using Creon as ordered, no issue.  No GI complaints today.  weight stable. Endorsing unable to tolerate Bactrim DS 2tab BID due to diarrhea and GI upset. Resolved since decreased dose to TID\par \par Sleep-DIS using Ambien 5mg 2x/week and benefitting. Endorsing 15-30 minutes with Ambien to intitate sleep. \par \par ENT: denies sinus congestion/PND/HA. Endorsing dry since being on study drug. Hasn't f/u with ENT, or repeated CT sinus\par \par Patient is on the following: \par Morning: VX-659 (240 mg), tezacaftor (100mg) and ivacaftor (150 mg) / Evening: ivacaftor (150 mg)

## 2019-07-12 NOTE — END OF VISIT
[FreeTextEntry3] : I agree with the nurse practitioners history, physical examination and plan of care. I personally elicited a history and examined the patient\par \par \par

## 2019-07-18 LAB — BACTERIA SPT CF RESP CULT: ABNORMAL

## 2019-07-26 ENCOUNTER — APPOINTMENT (OUTPATIENT)
Dept: ENDOCRINOLOGY | Facility: CLINIC | Age: 29
End: 2019-07-26

## 2019-08-05 LAB — RHODAMINE-AURAMINE STN SPEC: NORMAL

## 2019-08-12 ENCOUNTER — MEDICATION RENEWAL (OUTPATIENT)
Age: 29
End: 2019-08-12

## 2019-09-04 ENCOUNTER — MEDICATION RENEWAL (OUTPATIENT)
Age: 29
End: 2019-09-04

## 2019-09-04 ENCOUNTER — APPOINTMENT (OUTPATIENT)
Dept: PULMONOLOGY | Facility: CLINIC | Age: 29
End: 2019-09-04

## 2019-09-23 ENCOUNTER — MEDICATION RENEWAL (OUTPATIENT)
Age: 29
End: 2019-09-23

## 2019-11-13 ENCOUNTER — APPOINTMENT (OUTPATIENT)
Dept: PULMONOLOGY | Facility: CLINIC | Age: 29
End: 2019-11-13

## 2019-11-13 ENCOUNTER — RX CHANGE (OUTPATIENT)
Age: 29
End: 2019-11-13

## 2019-11-13 VITALS
WEIGHT: 138.2 LBS | HEART RATE: 65 BPM | HEIGHT: 65 IN | DIASTOLIC BLOOD PRESSURE: 79 MMHG | TEMPERATURE: 97.9 F | OXYGEN SATURATION: 98 % | BODY MASS INDEX: 23.03 KG/M2 | SYSTOLIC BLOOD PRESSURE: 126 MMHG | RESPIRATION RATE: 17 BRPM

## 2019-11-13 DIAGNOSIS — H60.509 UNSPECIFIED ACUTE NONINFECTIVE OTITIS EXTERNA, UNSPECIFIED EAR: ICD-10-CM

## 2019-11-13 RX ORDER — SULFAMETHOXAZOLE AND TRIMETHOPRIM 800; 160 MG/1; MG/1
800-160 TABLET ORAL TWICE DAILY
Qty: 56 | Refills: 0 | Status: DISCONTINUED | COMMUNITY
Start: 2019-06-19 | End: 2019-11-13

## 2019-11-14 NOTE — ED ADULT TRIAGE NOTE - BP NONINVASIVE SYSTOLIC (MM HG)
140 Niacinamide Pregnancy And Lactation Text: These medications are considered safe during pregnancy.

## 2019-11-20 NOTE — END OF VISIT
[FreeTextEntry3] : I agree with the physician assistant's history, physical examination and plan of care. \par \par

## 2019-11-20 NOTE — HISTORY OF PRESENT ILLNESS
[0  -  Nothing at all] : 0, nothing at all [Doing Well] : doing well [MRSA] : MRSA [MSSA] : MSSA [B. Cepacia] : Burholderia Cepacia [Pseudomonas] : Pseudomonas [Other: ___] : [unfilled] [___] : the last positive Pseudomonas result was [unfilled] [Last AFB Date: ___] : the AFB was performed  [unfilled] [None] : ~He/She~ has no hemoptysis [FVC ___] : the FVC was [unfilled] liters [FEV1: ___] : the FEV1 was [unfilled] liters [Other ___] : exercise [unfilled] [] : vest daily [Fair] : fair [Pancreatic Insufficiency] : pancreatic insufficiency [Pancreatic Enzyme Supp.] : uses pancreatic enzyme supplements [CFRD] : CFRD [Date: ___] : The last HgA1C was performed on [unfilled] [HgA1C Value: ___] : HgA1C value was [unfilled]  [AFB] : the patient had a MAC negative AFB [Headache] : no headache [Congestion] : no nasal congestion [Sinus Pain] : no sinus pain [Pancreatitis] : no pancreatitis [Diarrhea] : no diarrhea [Constipation] : no constipation [Steatorrhea] : no steatorrhea [de-identified] : This is a 30 y/o male CF pt who was dx'ed CF at 6 mo old, + sweat, + 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 Pt also has chronic sinusitis and PI. Dx'ed CFRD recently, pt last saw Endo March 2019 and started Novlog AC 2units.\par \par Pt is also here today for Research visit. Subject will be transitioning from [id number] to [id number]. Here today for early termination of [id number]. Pt reports no adverse events or side effects from current study drug. Last dose was last night. Regimen at this time: Morning: VX-659 (240 mg), tezacaftor (100mg) and ivacaftor (150 mg) / Evening: ivacaftor (150 mg). Baseline chronic cough mildly productive with greenish sputum.\par Complains of right ear fullness for two days and PND, used flonase today.\par Denies cp, palps, sob, wheezing, cough, sinus pressure/congestion, fevers, chills, rash, sore throat, abdominal pain, nausea/vomiting/diarrhea, edema, HAs.\par Pt has been consented to [id number] all study procedures reviewed and all questions answered.\par \par ACT: albuterol > HS 3% >Pulmozyme QD, vest infrequent 20-30mins once a week. Says he does not have time for it.\par Active at work walking up and down stairs. Discussed use of aerobika instead not amenable \par \par CFRD: Last A1C 6.3. Previously provided with CGM but not currently wearing has not worn in months. Endorsing not checking FS recently  has not used his insulin 2units AC novolog in 3 months, but due to work may forget to take insulin at every meal. Reports inconvenience of injecting and has been inconsistent for years. Did not like the freestyle yonathan says it was inaccurate. Has symptoms of feeling low but does not check his BG. Will make f/u apt with Endo \par \par Nutrition: Using Creon as ordered, no issue.  No GI complaints today. Weight stable.\par \par Sleep-DIS using Ambien 5mg 2x/week and benefitting. Endorsing 15-30 minutes with Ambien to initiate sleep. \par \par ENT: Reports right ear fullness for 2 days used OTC flonase today. Hasn't f/u with ENT, or repeated CT sinus\par \par Bone Density 11/28/16' normal T score-1.3\par \par Urology/Conception: no Vas Deferens Here with wife today inquiring about conceiving, made pt and wife aware to notify provider immediately when considering sperm extraction he will have to be off modulator for minimum of 3 months prior to procedure

## 2019-11-20 NOTE — REVIEW OF SYSTEMS
[Cough] : cough [see HPI] : see HPI [Negative] : Psychiatric [Earache] : earache [PND] : PND [Wheezing] : no wheezing [Shortness Of Breath] : no shortness of breath [SOB on Exertion] : no shortness of breath during exertion

## 2019-11-20 NOTE — PHYSICAL EXAM
[Normal Appearance] : normal appearance [General Appearance - Well Developed] : well developed [General Appearance - In No Acute Distress] : no acute distress [Well Groomed] : well groomed [Normal Conjunctiva] : the conjunctiva exhibited no abnormalities [No Oral Pallor] : no oral pallor [Normal Oral Mucosa] : normal oral mucosa [Normal Oropharynx] : normal oropharynx [Neck Appearance] : the appearance of the neck was normal [Neck Cervical Mass (___cm)] : no neck mass was observed [Heart Rate And Rhythm] : heart rate was normal and rhythm regular [Murmurs] : no murmurs [Heart Sounds Gallop] : no gallops [Heart Sounds] : normal S1 and S2 [] : no respiratory distress [Heart Sounds Pericardial Friction Rub] : no pericardial rub [Exaggerated Use Of Accessory Muscles For Inspiration] : no accessory muscle use [Respiration, Rhythm And Depth] : normal respiratory rhythm and effort [Abdomen Soft] : soft [Abdomen Tenderness] : non-tender [Musculoskeletal - Swelling] : no joint swelling seen [Skin Color & Pigmentation] : normal skin color and pigmentation [Cyanosis, Localized] : no localized cyanosis [Impaired Insight] : insight and judgment were intact [Oriented To Time, Place, And Person] : oriented to person, place, and time [Affect] : the affect was normal [Abdomen Mass (___ Cm)] : no abdominal mass palpated [Bowel Sounds] : normal bowel sounds [Abnormal Walk] : normal gait [No Focal Deficits] : no focal deficits [FreeTextEntry1] : fingernail clubbing noted

## 2019-11-20 NOTE — ASSESSMENT
[FreeTextEntry1] : This is a 28 y/o male CF pt who was dx'ed CF at 6 mo old, + sweat, + 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 Pt also has chronic sinusitis and PI. Dx'ed CFRD recently, pt last saw Endo March 2019 and started Novlog AC 2units.\par \par Pt is also here today for Research visit. Subject will be transitioning from [id number] to [id number]. Here today for early termination of [id number]. Pt reports no adverse events or side effects from current study drug. Last dose was last night. Regimen at this time: Morning: VX-659 (240 mg), tezacaftor (100mg) and ivacaftor (150 mg) / Evening: ivacaftor (150 mg). Baseline chronic cough mildly productive with greenish sputum.\par Complains of right ear fullness for two days and PND, used flonase today.\par Denies cp, palps, sob, wheezing, cough, sinus pressure/congestion, fevers, chills, rash, sore throat, abdominal pain, nausea/vomiting/diarrhea, edema, HAs.\par Pt has been consented to [id number] all study procedures reviewed and all questions answered. Was given first dose of  drug in office with fat containing meal. \par \par ACT: albuterol > HS 3% >Pulmozyme QD, vest infrequent 20-30mins once a week. Says he does not have time for it. Active at work walking up and down stairs. Discussed use of aerobika instead not amenable \par - discussed importance of increasing his ACT and using vest more consistently\par - Fev1 today 81%, 84% (6/2019)\par \par CFRD: Last A1C 6.3. Previously provided with CGM but not currently wearing has not worn in months. Endorsing not checking FS recently  has not used his insulin 2units AC novolog in 3 months, but due to work may forget to take insulin at every meal. Reports inconvenience of injecting and has been inconsistent for years. Did not like the freestyle yonathan says it was inaccurate. Has symptoms of feeling low but does not check his BG. Will make f/u apt with Endo \par - must f/u with Dr. Salazar, discussed importance of managing CFRD and potential complications, discussed other CGM options and possible insulin pump therapy to minimize his poor compliance with insulin and checking FSBG\par \par Nutrition: Using Creon as ordered, no issue.  No GI complaints today. Weight stable.\par \par Sleep-DIS using Ambien 5mg 2x/week and benefitting. Endorsing 15-30 minutes with Ambien to initiate sleep. \par \par ENT: Reports right ear fullness for 2 days used OTC flonase today. Hasn't f/u with ENT, or repeated CT sinus\par - Right ear Otitis Externa: Rx Ciprodex, flonase and f/u if sxs progress/worsen\par \par Bone Density 11/28/16' normal T score-1.3\par \par Urology/Conception: no Vas Deferens Here with wife today inquiring about conceiving, made pt and wife aware to notify provider immediately when considering sperm extraction he will have to be off modulator for minimum of 3 months prior to procedure\par - Wife Christianne will have genetic testing in office (has no insurance at this time) \par \par Health Maintenance:\par Flu vaccine up to date Left Im 11/13/19 \par Pneumococcal 23 to be administered in 2020\par BD up to date repeat in 5 years\par CXR last in 2018 given rx today \par \par Reports high deductible for insuance >$7000, does not have [employer] will enroll.\par f/u 2 weeks for research

## 2019-11-26 ENCOUNTER — APPOINTMENT (OUTPATIENT)
Dept: PULMONOLOGY | Facility: CLINIC | Age: 29
End: 2019-11-26

## 2019-11-26 VITALS
TEMPERATURE: 97.6 F | OXYGEN SATURATION: 100 % | HEART RATE: 69 BPM | BODY MASS INDEX: 23.16 KG/M2 | SYSTOLIC BLOOD PRESSURE: 131 MMHG | HEIGHT: 65 IN | DIASTOLIC BLOOD PRESSURE: 81 MMHG | RESPIRATION RATE: 15 BRPM | WEIGHT: 139 LBS

## 2019-11-26 RX ORDER — COLISTIN SULFATE, NEOMYCIN SULFATE, THONZONIUM BROMIDE AND HYDROCORTISONE ACETATE 3; 3.3; .5; 1 MG/ML; MG/ML; MG/ML; MG/ML
3.3-3-10-0.5 SUSPENSION AURICULAR (OTIC) 4 TIMES DAILY
Qty: 1 | Refills: 0 | Status: DISCONTINUED | COMMUNITY
Start: 2019-11-13 | End: 2019-11-26

## 2019-11-26 RX ORDER — CIPROFLOXACIN AND DEXAMETHASONE 3; 1 MG/ML; MG/ML
0.3-0.1 SUSPENSION/ DROPS AURICULAR (OTIC) TWICE DAILY
Qty: 1 | Refills: 0 | Status: DISCONTINUED | COMMUNITY
Start: 2019-11-13 | End: 2019-11-26

## 2019-12-05 NOTE — HISTORY OF PRESENT ILLNESS
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 [Pseudomonas] : Pseudomonas [Other: ___] : [unfilled] [___] : the last positive Pseudomonas result was [unfilled] [Last AFB Date: ___] : the AFB was performed  [unfilled] [None] : ~He/She~ has no hemoptysis [FVC ___] : the FVC was [unfilled] liters [FEV1: ___] : the FEV1 was [unfilled] liters [Other ___] : exercise [unfilled] [] : vest daily [Fair] : fair [Pancreatic Enzyme Supp.] : uses pancreatic enzyme supplements [Pancreatic Insufficiency] : pancreatic insufficiency [CFRD] : CFRD [Date: ___] : The last HgA1C was performed on [unfilled] [HgA1C Value: ___] : HgA1C value was [unfilled]  [AFB] : the patient had a MAC negative AFB [Headache] : no headache [Pancreatitis] : no pancreatitis [Congestion] : no nasal congestion [Sinus Pain] : no sinus pain [Diarrhea] : no diarrhea [Constipation] : no constipation [Steatorrhea] : no steatorrhea [de-identified] : This is a 30 y/o male CF pt who was dx'ed CF at 6 mo old, + sweat, + 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 Pt also has chronic sinusitis and PI.\par \par Subject completed [id number] on 11/13/19.\par Pt is here today for Research visit for [id number], Day 15 visit. Pt reports no adverse events or side effects from current study drug. Last dose was last night. No acute complaints, reports compliance and returned all used and unused study drug. Baseline chronic cough mildly productive with greenish sputum. Right ear fullness self resolved and did not have to use drops. Denies cp, palps, change in cough, sob, wheezing, sinus pressure/congestion, fevers, chills, rash, sore throat, abdominal pain, nausea/vomiting/diarrhea, edema, HAs.\par Subject was consented to [id number] on 11/13/19 all study procedures reviewed and all questions answered.\par \par ACT: albuterol > HS 3% >Pulmozyme QD, vest infrequent 20-30mins once a week. Says he does not have time for it.\par Active at work walking up and down stairs. Discussed use of aerobika instead not amenable \par \par CFRD: Last A1C 6.3. Previously provided with CGM but not currently wearing has not worn in months. Endorsing not checking FS recently  has not used his insulin 2units AC novolog in 3 months, but due to work may forget to take insulin at every meal. Reports inconvenience of injecting and has been inconsistent for years. Did not like the freestyle yonathan says it was inaccurate. Has symptoms of feeling low but does not check his BG. pt last saw Endo March 2019 and Noshowed his june apt, started Novlog AC 2units which he is noncompliant with.Will make f/u apt with Endo \par \par Nutrition: Using Creon as ordered, no issue.  No GI complaints today. Weight stable.\par \par Sleep-DIS using Ambien 5mg 2x/week and benefitting. Endorsing 15-30 minutes with Ambien to initiate sleep. \par \par ENT: Hasn't f/u with ENT, or repeated CT sinus, no acute sinus sxs at this time\par \par Bone Density 11/28/16' normal T score-1.3\par \par Urology/Conception: no Vas Deferens Here with wife today inquiring about conceiving, made pt and wife aware to notify provider immediately when considering sperm extraction he will have to be off modulator for minimum of 3 months prior to procedure\par \par Social:  2019, not eligible for [employer] due to income.

## 2019-12-05 NOTE — PHYSICAL EXAM
[General Appearance - Well Developed] : well developed [Normal Appearance] : normal appearance [Normal Conjunctiva] : the conjunctiva exhibited no abnormalities [Well Groomed] : well groomed [General Appearance - In No Acute Distress] : no acute distress [Normal Oral Mucosa] : normal oral mucosa [No Oral Pallor] : no oral pallor [Normal Oropharynx] : normal oropharynx [Neck Appearance] : the appearance of the neck was normal [Heart Rate And Rhythm] : heart rate was normal and rhythm regular [Neck Cervical Mass (___cm)] : no neck mass was observed [Heart Sounds Gallop] : no gallops [Heart Sounds] : normal S1 and S2 [Murmurs] : no murmurs [Heart Sounds Pericardial Friction Rub] : no pericardial rub [] : no respiratory distress [Exaggerated Use Of Accessory Muscles For Inspiration] : no accessory muscle use [Respiration, Rhythm And Depth] : normal respiratory rhythm and effort [Bowel Sounds] : normal bowel sounds [Abdomen Tenderness] : non-tender [Abdomen Soft] : soft [Abdomen Mass (___ Cm)] : no abdominal mass palpated [Abnormal Walk] : normal gait [Musculoskeletal - Swelling] : no joint swelling seen [Cyanosis, Localized] : no localized cyanosis [No Focal Deficits] : no focal deficits [Skin Color & Pigmentation] : normal skin color and pigmentation [Impaired Insight] : insight and judgment were intact [Oriented To Time, Place, And Person] : oriented to person, place, and time [Affect] : the affect was normal [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

## 2019-12-05 NOTE — ASSESSMENT
[FreeTextEntry1] : This is a 30 y/o male CF pt who was dx'ed CF at 6 mo old, + sweat, + 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 Pt also has chronic sinusitis and PI. Dx'ed CFRD recently, pt last saw Endo March 2019 and started Novlog AC 2units.\par \par Subject completed [id number] on 11/13/19.\par Pt is here today for Research visit for [id number], Day 15 visit. Pt reports no adverse events or side effects from current study drug. Last dose was last night. No acute complaints, reports compliance and returned all used and unused study drug. Baseline chronic cough mildly productive with greenish sputum. Right ear fullness self resolved and did not have to use drops. Pt is tolerating all study procedures well and would like to continue in the trial. \par Subject was consented to [id number] on 11/13/19 all study procedures reviewed and all questions answered.\par \par ACT: albuterol > HS 3% >Pulmozyme QD, vest infrequent 20-30mins once a week. Says he does not have time for it. Active at work walking up and down stairs. Discussed use of aerobika instead not amenable \par - discussed importance of increasing his ACT and using vest more consistently\par - Fev1 today 81%, 81% (11/13/19), 84% (6/2019)\par \par CFRD: Last A1C 6.3. Previously provided with CGM but not currently wearing has not worn in months. Endorsing not checking FS recently  has not used his insulin 2units AC novolog in 3 months, but due to work may forget to take insulin at every meal. Reports inconvenience of injecting and has been inconsistent for years. Did not like the freestyle yonathan says it was inaccurate. Has symptoms of feeling low but does not check his BG.\par - must f/u with Dr. Salazar, discussed importance of managing CFRD and potential complications, discussed other CGM options and possible insulin pump therapy to minimize his poor compliance with insulin and checking FSBG still has not made apt \par \par Nutrition: Using Creon as ordered, no issue.  No GI complaints today. Weight stable.\par \par Sleep-DIS using Ambien 5mg 2x/week and benefitting. Endorsing 15-30 minutes with Ambien to initiate sleep. \par \par ENT: Hasn't f/u with ENT, or repeated CT sinus. No acute sinus complaints\par \par Bone Density 11/28/16' normal T score-1.3\par - repeat in 2021\par \par Urology/Conception: no Vas Deferens Here with wife today inquiring about conceiving, made pt and wife aware to notify provider immediately when considering sperm extraction he will have to be off modulator for minimum of 3 months prior to procedure\par - Wife Christianne will have genetic testing in office (has no insurance at this time) discussed with pt and they agree to do testing once her insurance is straightened out\par \par Health Maintenance:\par Flu vaccine up to date Left Im 11/13/19 \par Pneumococcal 23 to be administered in 2020\par BD up to date repeat in 5 years\par CXR last in 2018 given Rx\par Given sputum cup to bring back next OV\par \par CARDIO: consider ECHO for abnormal P waves- will repeat EKG next OV\par \par f/u 2 weeks for research

## 2019-12-05 NOTE — REVIEW OF SYSTEMS
[Earache] : earache [Cough] : cough [see HPI] : see HPI [PND] : PND [Negative] : Psychiatric [Shortness Of Breath] : no shortness of breath [Wheezing] : no wheezing [SOB on Exertion] : no shortness of breath during exertion

## 2019-12-05 NOTE — END OF VISIT
[FreeTextEntry3] : I agree with the physician assistant's history, physical examination and plan of care.\par \par \par

## 2019-12-11 ENCOUNTER — APPOINTMENT (OUTPATIENT)
Dept: PULMONOLOGY | Facility: CLINIC | Age: 29
End: 2019-12-11

## 2019-12-11 VITALS
OXYGEN SATURATION: 100 % | HEART RATE: 68 BPM | RESPIRATION RATE: 15 BRPM | TEMPERATURE: 98.1 F | DIASTOLIC BLOOD PRESSURE: 74 MMHG | BODY MASS INDEX: 22.73 KG/M2 | SYSTOLIC BLOOD PRESSURE: 127 MMHG | HEIGHT: 65 IN | WEIGHT: 136.4 LBS

## 2019-12-13 NOTE — REVIEW OF SYSTEMS
[Earache] : earache [Cough] : cough [PND] : PND [see HPI] : see HPI [Negative] : Heme/Lymph [Shortness Of Breath] : no shortness of breath [SOB on Exertion] : no shortness of breath during exertion [Wheezing] : no wheezing

## 2019-12-13 NOTE — ASSESSMENT
[FreeTextEntry1] : This is a 28 y/o male CF pt who was dx'ed CF at 6 mo old, + sweat, + 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 Pt also has chronic sinusitis and PI. Dx'ed CFRD recently, pt last saw Endo March 2019 and started Novlog AC 2units.\par \par Subject completed [id number] on 11/13/19.\par Pt is here today for Research visit for [id number], Week 4 visit. Pt reports no adverse events or side effects from current study drug. Last dose was last night. No acute complaints, reports compliance and returned all used and unused study drug. Baseline chronic cough mildly productive with greenish sputum. Pt is tolerating all study procedures well and would like to continue in the trial. \par Subject was consented to [id number] on 11/13/19 all study procedures reviewed and all questions answered.\par \par ACT: albuterol > HS 3% >Pulmozyme QD, vest infrequent 20-30mins once a week. Says he does not have time for it. Active at work walking up and down stairs. Discussed use of aerobika instead not amenable \par - discussed importance of increasing his ACT and using vest more consistently\par - Fev1 today 81% (12/11/19), 81% (11/26/19), 81% (11/13/19), 84% (6/2019)\par \par CFRD: Last A1C 6.3. Previously provided with CGM but not currently wearing has not worn in months. Endorsing not checking FS recently  has not used his insulin 2units AC novolog in 3 months, but due to work may forget to take insulin at every meal. Reports inconvenience of injecting and has been inconsistent for years. Did not like the freestyle yonathan says it was inaccurate. Has symptoms of feeling low but does not check his BG.\par - must f/u with Dr. Salazar, discussed importance of managing CFRD and potential complications, discussed other CGM options and possible insulin pump therapy to minimize his poor compliance with insulin and checking FSBG still has not made apt \par \par Nutrition: Using Creon as ordered, no issue.  No GI complaints today. Weight stable.\par \par Sleep-DIS using Ambien 5mg 2x/week and benefitting. Endorsing 15-30 minutes with Ambien to initiate sleep. \par \par ENT: Hasn't f/u with ENT, or repeated CT sinus. No acute sinus complaints\par \par Bone Density 11/28/16' normal T score-1.3\par - repeat in 2021\par \par Urology/Conception: no Vas Deferens Here with wife today inquiring about conceiving, made pt and wife aware to notify provider immediately when considering sperm extraction he will have to be off modulator for minimum of 3 months prior to procedure\par - Wife Christianne will have genetic testing in office (has no insurance at this time) discussed with pt and they agree to do testing once her insurance is straightened out\par \par Health Maintenance:\par Flu vaccine up to date 11/13/19 \par Pneumococcal 23 to be administered in 2020\par BD up to date repeat in 5 years\par CXR last in 2018 given Rx twice has not gone\par Did not bring back sputum given cup again\par \par CARDIO: ordered ECHO for abnormal biphasic appearing pwaves on prior EKGs\par \par f/u 1 month for research and clinic OV

## 2019-12-13 NOTE — PHYSICAL EXAM
[General Appearance - Well Developed] : well developed [Well Groomed] : well groomed [Normal Appearance] : normal appearance [Normal Conjunctiva] : the conjunctiva exhibited no abnormalities [General Appearance - In No Acute Distress] : no acute distress [Normal Oral Mucosa] : normal oral mucosa [Normal Oropharynx] : normal oropharynx [No Oral Pallor] : no oral pallor [Neck Appearance] : the appearance of the neck was normal [Neck Cervical Mass (___cm)] : no neck mass was observed [Heart Sounds] : normal S1 and S2 [Heart Rate And Rhythm] : heart rate was normal and rhythm regular [Heart Sounds Gallop] : no gallops [Murmurs] : no murmurs [Heart Sounds Pericardial Friction Rub] : no pericardial rub [] : no respiratory distress [Respiration, Rhythm And Depth] : normal respiratory rhythm and effort [Exaggerated Use Of Accessory Muscles For Inspiration] : no accessory muscle use [Abdomen Soft] : soft [Bowel Sounds] : normal bowel sounds [Abdomen Tenderness] : non-tender [Musculoskeletal - Swelling] : no joint swelling seen [Abdomen Mass (___ Cm)] : no abdominal mass palpated [Abnormal Walk] : normal gait [Cyanosis, Localized] : no localized cyanosis [Oriented To Time, Place, And Person] : oriented to person, place, and time [Skin Color & Pigmentation] : normal skin color and pigmentation [No Focal Deficits] : no focal deficits [Impaired Insight] : insight and judgment were intact [Affect] : the affect was normal [FreeTextEntry1] : fingernail clubbing noted

## 2019-12-13 NOTE — HISTORY OF PRESENT ILLNESS
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 [Pseudomonas] : Pseudomonas [Other: ___] : [unfilled] [___] : the last positive Pseudomonas result was [unfilled] [Last AFB Date: ___] : the AFB was performed  [unfilled] [None] : no [FVC ___] : the FVC was [unfilled] liters [FEV1: ___] : the FEV1 was [unfilled] liters [] : vest daily [Other ___] : exercise [unfilled] [Fair] : fair [Pancreatic Insufficiency] : pancreatic insufficiency [Pancreatic Enzyme Supp.] : uses pancreatic enzyme supplements [Date: ___] : The last HgA1C was performed on [unfilled] [CFRD] : CFRD [HgA1C Value: ___] : HgA1C value was [unfilled]  [AFB] : the patient had a MAC negative AFB [Headache] : no headache [Congestion] : no nasal congestion [Sinus Pain] : no sinus pain [Pancreatitis] : no pancreatitis [Diarrhea] : no diarrhea [Steatorrhea] : no steatorrhea [Constipation] : no constipation [de-identified] : This is a 30 y/o male CF pt who was dx'ed CF at 6 mo old, + sweat, + 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 Pt also has chronic sinusitis and PI.\par \par Subject completed [id number] on 11/13/19.\par Pt is here today for Research visit for [id number], Week4 visit. Pt reports no adverse events or side effects from current study drug. Last dose was last night. No acute complaints, reports good compliance and returned all used and unused study drug. Baseline chronic cough mildly productive with greenish sputum, no change since starting study drug. Denies cp, palps, change in cough, sob, wheezing, sinus pressure/congestion, fevers, chills, rash, sore throat, abdominal pain, nausea/vomiting/diarrhea, edema, HAs.\par Subject was consented to [id number] on 11/13/19 all study procedures reviewed and all questions answered.\par \par ACT: albuterol > HS 3% >Pulmozyme QD, vest infrequent 20-30mins once a week. Says he does not have time for it.\par Active at work walking up and down stairs. Discussed use of aerobika instead not amenable \par \par CFRD: Last A1C 6.3. Previously provided with CGM but not currently wearing has not worn in months. Endorsing not checking FS recently  has not used his insulin 2units AC novolog in 3 months, but due to work may forget to take insulin at every meal. Reports inconvenience of injecting and has been inconsistent for years. Did not like the freestyle yonathan says it was inaccurate. Has symptoms of feeling low but does not check his BG. pt last saw Endo March 2019 and Noshowed his june apt, started Novlog AC 2units which he is noncompliant with.Will make f/u apt with Endo \par \par Nutrition: Using Creon as ordered, no issue.  No GI complaints today. Weight stable.\par \par Sleep-DIS using Ambien 5mg 2x/week and benefitting. Endorsing 15-30 minutes with Ambien to initiate sleep. \par \par ENT: Hasn't f/u with ENT, or repeated CT sinus, no acute sinus sxs at this time\par \par Bone Density 11/28/16' normal T score-1.3\par \par Urology/Conception: no Vas Deferens Here with wife today inquiring about conceiving, made pt and wife aware to notify provider immediately when considering sperm extraction he will have to be off modulator for minimum of 3 months prior to procedure\par \par Social:  2019, not eligible for [employer] due to income.

## 2020-01-13 ENCOUNTER — APPOINTMENT (OUTPATIENT)
Dept: PULMONOLOGY | Facility: CLINIC | Age: 30
End: 2020-01-13

## 2020-01-13 ENCOUNTER — APPOINTMENT (OUTPATIENT)
Dept: PULMONOLOGY | Facility: CLINIC | Age: 30
End: 2020-01-13
Payer: COMMERCIAL

## 2020-01-13 VITALS
RESPIRATION RATE: 15 BRPM | HEIGHT: 65 IN | HEART RATE: 69 BPM | WEIGHT: 135.2 LBS | TEMPERATURE: 97.8 F | OXYGEN SATURATION: 99 % | DIASTOLIC BLOOD PRESSURE: 81 MMHG | SYSTOLIC BLOOD PRESSURE: 125 MMHG | BODY MASS INDEX: 22.53 KG/M2

## 2020-01-13 DIAGNOSIS — Z79.2 LONG TERM (CURRENT) USE OF ANTIBIOTICS: ICD-10-CM

## 2020-01-13 DIAGNOSIS — R94.31 ABNORMAL ELECTROCARDIOGRAM [ECG] [EKG]: ICD-10-CM

## 2020-01-13 PROCEDURE — 99215 OFFICE O/P EST HI 40 MIN: CPT

## 2020-01-13 NOTE — HISTORY OF PRESENT ILLNESS
[Doing Well] : doing well [MSSA] : MSSA [0  -  Nothing at all] : 0, nothing at all [MRSA] : MRSA [B. Cepacia] : Burholderia Cepacia [Pseudomonas] : Pseudomonas [Other: ___] : [unfilled] [___] : the last positive Pseudomonas result was [unfilled] [Last AFB Date: ___] : the AFB was performed  [unfilled] [None] : ~He/She~ has no hemoptysis [FVC ___] : the FVC was [unfilled] liters [FEV1: ___] : the FEV1 was [unfilled] liters [] : vest daily [Other ___] : exercise [unfilled] [Fair] : fair [Pancreatic Insufficiency] : pancreatic insufficiency [Pancreatic Enzyme Supp.] : uses pancreatic enzyme supplements [CFRD] : CFRD [Date: ___] : The last HgA1C was performed on [unfilled] [HgA1C Value: ___] : HgA1C value was [unfilled]  [AFB] : the patient had a MAC negative AFB [Headache] : no headache [Congestion] : no nasal congestion [Pancreatitis] : no pancreatitis [Sinus Pain] : no sinus pain [Steatorrhea] : no steatorrhea [Constipation] : no constipation [Diarrhea] : no diarrhea [de-identified] : This is a 28 y/o male CF pt who was dx'ed CF at 6 mo old, + sweat, + 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 Pt also has chronic sinusitis and PI.\par \par Subject completed [id number] on 11/13/19.\par Pt is here today for Clinic and Research visit for [id number], Week 8 visit. Pt reports no adverse events or side effects from current study drug. Last dose was last night. No acute complaints, reports good compliance and returned all used and unused study drug. Baseline chronic cough mildly productive with greenish sputum, no change since starting study drug. Had increased sinus congestion 2 weeks ago last for 4 days and went to an urgent care was prescribed a zpack only took 1 day of antibiotics and stopped because he felt better. At respiratory baseline today.Denies cp, palps, change in cough, sob, wheezing, sinus pressure/congestion, fevers, chills, rash, sore throat, abdominal pain, nausea/vomiting/diarrhea, edema, HAs.\par Subject was consented to [id number] on 11/13/19 all study procedures reviewed and all questions answered.\par \par ACT: albuterol > HS 3% >Pulmozyme, vest infrequent 20-30mins. 1-2x week Says he does not have time for it.\par Active at work walking up and down stairs. Discussed use of aerobika instead says hes now amenable \par \par CFRD: Last A1C 6.3. Previously provided with CGM but not currently wearing has not worn in months. Endorsing not checking FS recently  has not used his insulin 2units AC novolog in 3 months, but due to work may forget to take insulin at every meal. Reports inconvenience of injecting and has been inconsistent for years. Did not like the freestyle yonathan says it was inaccurate. Has symptoms of feeling low but does not check his BG. pt last saw Endo March 2019 and Noshowed his june apt, started Novlog AC 2units which he is noncompliant with. Has apt with Dr Salazar 3/2020 trying to move up appt.\par \par Nutrition: Using Creon as ordered, no issue.  No GI complaints today. Weight loss about 5 lbs since Nov 2019\par \par Sleep-DIS using Ambien 5mg 2x/week and benefitting. Endorsing 15-30 minutes with Ambien to initiate sleep. \par \par ENT: Hasn't f/u with ENT, or repeated CT sinus, no acute sinus sxs at this time\par \par Bone Density 11/28/16' normal T score-1.3\par \par Urology/Conception: no Vas Deferens Here with wife today inquiring about conceiving, made pt and wife aware to notify provider immediately when considering sperm extraction he will have to be off modulator for minimum of 3 months prior to procedure. Not actively trying to conceive still using condoms and protection\par \par Social:  2019, not eligible for [employer] due to income.

## 2020-01-13 NOTE — PHYSICAL EXAM
[General Appearance - Well Developed] : well developed [Normal Appearance] : normal appearance [Well Groomed] : well groomed [Normal Conjunctiva] : the conjunctiva exhibited no abnormalities [General Appearance - In No Acute Distress] : no acute distress [Normal Oral Mucosa] : normal oral mucosa [No Oral Pallor] : no oral pallor [Normal Oropharynx] : normal oropharynx [Neck Appearance] : the appearance of the neck was normal [Heart Rate And Rhythm] : heart rate was normal and rhythm regular [Neck Cervical Mass (___cm)] : no neck mass was observed [Heart Sounds Gallop] : no gallops [Heart Sounds] : normal S1 and S2 [Murmurs] : no murmurs [] : no respiratory distress [Heart Sounds Pericardial Friction Rub] : no pericardial rub [Respiration, Rhythm And Depth] : normal respiratory rhythm and effort [Bowel Sounds] : normal bowel sounds [Exaggerated Use Of Accessory Muscles For Inspiration] : no accessory muscle use [Abdomen Tenderness] : non-tender [Abdomen Soft] : soft [Abdomen Mass (___ Cm)] : no abdominal mass palpated [Abnormal Walk] : normal gait [Musculoskeletal - Swelling] : no joint swelling seen [Cyanosis, Localized] : no localized cyanosis [No Focal Deficits] : no focal deficits [Skin Color & Pigmentation] : normal skin color and pigmentation [Oriented To Time, Place, And Person] : oriented to person, place, and time [Impaired Insight] : insight and judgment were intact [Affect] : the affect was normal [Auscultation Breath Sounds / Voice Sounds] : lungs were clear to auscultation bilaterally [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

## 2020-01-13 NOTE — ASSESSMENT
[FreeTextEntry1] : ATTENDING ATTESTATION\par \par This is a 28 y/o male CF pt who was dx'ed CF at 6 mo old, + sweat, + 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 Pt also has chronic sinusitis and PI. Dx'ed CFRD recently, pt last saw Endo March 2019 and started Novlog AC 2units.\par \par Subject completed [id number] on 11/13/19.\par Pt is here today for Clinic and Research visit for [id number], Week 8 visit. Pt reports no adverse events or side effects from current study drug. Last dose was last night. Had increased sinus congestion 2 weeks ago last for 4 days and went to an urgent care was prescribed a zpack only took 1 day of antibiotics and stopped because he felt better. At respiratory baseline today. No acute complaints, reports compliance and returned all used and unused study drug. Baseline chronic cough mildly productive with greenish sputum. Pt is tolerating all study procedures well and would like to continue in the trial. \par Subject was consented to [id number] on 11/13/19 all study procedures reviewed and all questions answered.\par \par ACT: albuterol > HS 3% >Pulmozyme, vest infrequent 20-30mins 1-2x week. Says he does not have time for it. Active at work walking up and down stairs. Discussed use of aerobika instead now amenable \par - discussed importance of increasing his ACT and using vest more consistently\par - Fev1 today 79% (1/13/20), 81% (12/11/19), 81% (11/26/19), 81% (11/13/19), 84% (6/2019)\par - given aerobika \par \par CFRD: Last A1C 6.3. Previously provided with CGM but not currently wearing has not worn in months. Endorsing not checking FS recently  has not used his insulin 2units AC novolog in 3 months, but due to work may forget to take insulin at every meal. Reports inconvenience of injecting and has been inconsistent for years. Did not like the freestyle yonathan says it was inaccurate. Has symptoms of feeling low but does not check his BG. Has not check BG in over 90 days and has not used insulin since then\par - must f/u with Dr. Salazar, discussed importance of managing CFRD and potential complications, discussed other CGM options and possible insulin pump therapy to minimize his poor compliance with insulin and checking FSBG\par - Has apt Dr. Salazar 3/2020 trying to move it up\par - given rx for insulin and instructed to check FS monitor for hypoglycemia \par - instructed to see Opthalmology \par \par Nutrition: Using Creon as ordered 5-6 with meals, 3-4 with snacks, no issue.  No GI complaints today. Weight loss 5 lbs since 11/2019.\par - urged importance of insulin compliance.\par - tasked to f/u with Nutrition/RD\par \par Sleep-DIS using Ambien 5mg 2x/week and benefitting. Endorsing 15-30 minutes with Ambien to initiate sleep. \par \par ENT: Hasn't f/u with ENT, or repeated CT sinus. No acute sinus complaints\par \par Bone Density 11/28/16' normal T score-1.3\par - repeat in 2021\par \par Urology/Conception: no Vas Deferens Here with wife today inquiring about conceiving, made pt and wife aware to notify provider immediately when considering sperm extraction he will have to be off modulator for minimum of 3 months prior to procedure. Not actively trying, still using barrier contraception advised to continue using protection.\par - Wife Christianne will have genetic testing in office (has no insurance at this time) discussed with pt and they agree to do testing once her insurance is straightened out\par - tasked  to help couple find appropriate plan \par \par Health Maintenance:\par Flu vaccine up to date 11/13/19 \par Pneumococcal 23 to be administered in 2020\par BD up to date repeat in 5 years\par CXR last in 2018 given Rx twice still has not gone, reinforced today \par Sputum CS today \par \par CARDIO: ordered ECHO for abnormal biphasic appearing pwaves on prior EKGs\par - still has not gone reinforced again today \par \par f/u 1 month phonecall for research \par Next research visit

## 2020-01-13 NOTE — END OF VISIT
[FreeTextEntry3] : I agree with the physician assistant's history, physical examination and plan of care. I personally elicited a history and examined the patient. See above attestation.\par \par \par \par \par  [Time Spent: ___ minutes] : I have spent [unfilled] minutes of face to face time with the patient

## 2020-01-21 ENCOUNTER — OTHER (OUTPATIENT)
Age: 30
End: 2020-01-21

## 2020-01-21 ENCOUNTER — APPOINTMENT (OUTPATIENT)
Dept: CARDIOLOGY | Facility: CLINIC | Age: 30
End: 2020-01-21

## 2020-01-22 ENCOUNTER — RESULT REVIEW (OUTPATIENT)
Age: 30
End: 2020-01-22

## 2020-01-22 LAB — BACTERIA SPT CF RESP CULT: ABNORMAL

## 2020-01-29 ENCOUNTER — CLINICAL ADVICE (OUTPATIENT)
Age: 30
End: 2020-01-29

## 2020-02-06 ENCOUNTER — APPOINTMENT (OUTPATIENT)
Dept: PULMONOLOGY | Facility: CLINIC | Age: 30
End: 2020-02-06
Payer: COMMERCIAL

## 2020-02-06 VITALS
HEART RATE: 70 BPM | DIASTOLIC BLOOD PRESSURE: 74 MMHG | RESPIRATION RATE: 18 BRPM | WEIGHT: 132 LBS | OXYGEN SATURATION: 97 % | TEMPERATURE: 98.1 F | BODY MASS INDEX: 21.99 KG/M2 | SYSTOLIC BLOOD PRESSURE: 125 MMHG | HEIGHT: 65 IN

## 2020-02-06 DIAGNOSIS — R05 COUGH: ICD-10-CM

## 2020-02-06 DIAGNOSIS — R68.89 OTHER GENERAL SYMPTOMS AND SIGNS: ICD-10-CM

## 2020-02-06 DIAGNOSIS — J32.9 CHRONIC SINUSITIS, UNSPECIFIED: ICD-10-CM

## 2020-02-06 PROCEDURE — 99215 OFFICE O/P EST HI 40 MIN: CPT

## 2020-02-06 NOTE — PHYSICAL EXAM
[General Appearance - Well Developed] : well developed [Normal Conjunctiva] : the conjunctiva exhibited no abnormalities [Normal Oral Mucosa] : normal oral mucosa [No Oral Pallor] : no oral pallor [Normal Oropharynx] : normal oropharynx [Neck Cervical Mass (___cm)] : no neck mass was observed [Neck Appearance] : the appearance of the neck was normal [Heart Sounds] : normal S1 and S2 [Heart Rate And Rhythm] : heart rate was normal and rhythm regular [Heart Sounds Pericardial Friction Rub] : no pericardial rub [Heart Sounds Gallop] : no gallops [Murmurs] : no murmurs [Respiration, Rhythm And Depth] : normal respiratory rhythm and effort [] : no respiratory distress [Exaggerated Use Of Accessory Muscles For Inspiration] : no accessory muscle use [Abdomen Soft] : soft [Abdomen Tenderness] : non-tender [Bowel Sounds] : normal bowel sounds [Abdomen Mass (___ Cm)] : no abdominal mass palpated [Abnormal Walk] : normal gait [Musculoskeletal - Swelling] : no joint swelling seen [Skin Color & Pigmentation] : normal skin color and pigmentation [Cyanosis, Localized] : no localized cyanosis [Oriented To Time, Place, And Person] : oriented to person, place, and time [No Focal Deficits] : no focal deficits [Affect] : the affect was normal [Impaired Insight] : insight and judgment were intact [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

## 2020-02-06 NOTE — REVIEW OF SYSTEMS
[Earache] : earache [Cough] : cough [PND] : PND [see HPI] : see HPI [Negative] : Psychiatric [Shortness Of Breath] : no shortness of breath [Wheezing] : no wheezing [SOB on Exertion] : no shortness of breath during exertion

## 2020-02-06 NOTE — END OF VISIT
[Time Spent: ___ minutes] : I have spent [unfilled] minutes of face to face time with the patient [FreeTextEntry3] : I agree with the physician assistant's history, physical examination and plan of care. I personally elicited a history and examined the patient. See above attestation.\par \par \par \par \par

## 2020-02-06 NOTE — HISTORY OF PRESENT ILLNESS
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 [Other: ___] : [unfilled] [Pseudomonas] : Pseudomonas [___] : the last positive B.Cepacia result was [unfilled] [Last AFB Date: ___] : the AFB was performed  [unfilled] [None] : ~He/She~ has no hemoptysis [FVC ___] : the FVC was [unfilled] liters [FEV1: ___] : the FEV1 was [unfilled] liters [] : vest daily [Fair] : fair [Other ___] : exercise [unfilled] [Pancreatic Insufficiency] : pancreatic insufficiency [Pancreatic Enzyme Supp.] : uses pancreatic enzyme supplements [Date: ___] : The last HgA1C was performed on [unfilled] [CFRD] : CFRD [HgA1C Value: ___] : HgA1C value was [unfilled]  [AFB] : the patient had a MAC negative AFB [Headache] : no headache [Congestion] : no nasal congestion [Sinus Pain] : no sinus pain [Diarrhea] : no diarrhea [Pancreatitis] : no pancreatitis [Steatorrhea] : no steatorrhea [Constipation] : no constipation [de-identified] : This is a 28 y/o male CF pt who was dx'ed CF at 6 mo old, + sweat, + 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 Pt also has chronic sinusitis and PI.\par \par Subject completed [id number] on 11/13/19.\par Research completed Week 8 visit for [id number]. Pt reports no adverse events or side effects from current study drug. Last dose was last night. No acute complaints, reports good compliance and returned all used and unused study drug.\par \par Pt is here today for a sick visit. Pt was diagnosed with Influenza A on 1/29 at urgent care. Took one day of tamiflu and was vomiting and dry heaving so stopped taking. Took 20mg of Prednisone for last two days for ear pain. Had fevers none recently. Still having sweats, increased cough with more mucous production, green sputum, left ear pain, sinus pressure, runny nose. Denies sob, wheezing, abdominal pain, diarrhea, sore throat. \par \par Nutrition: Using Creon as ordered, no issue.  No GI complaints today. Continues to lose weight must f/u with Nutrition and ENDO \par \par Sleep-DIS using Ambien 5mg 2x/week and benefitting. Endorsing 15-30 minutes with Ambien to initiate sleep. \par \par ENT: Hasn't f/u with ENT, or repeated CT sinus, no acute sinus sxs at this time. Apt 2/19\par \par Bone Density 11/28/16' normal T score-1.3\par \par Urology/Conception: no Vas Deferens Here with wife today inquiring about conceiving, made pt and wife aware to notify provider immediately when considering sperm extraction he will have to be off modulator for minimum of 3 months prior to procedure. Not actively trying to conceive still using condoms and protection\par \par Social:  2019, not eligible for Matchup due to income.

## 2020-02-06 NOTE — ASSESSMENT
[FreeTextEntry1] : ATTENDING ATTESTATION\par \par This is a 28 y/o male CF pt who was dx'ed CF at 6 mo old, + sweat, + 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 Pt also has chronic sinusitis and PI. Dx'ed CFRD recently, pt last saw Endo March 2019 and started Novlog AC 2units.\par \par Subject completed [id number] on 11/13/19.\par Research completed Week 8 visit for [id number]. Pt reports no adverse events or side effects from current study drug. Last dose was last night. No acute complaints, reports good compliance and returned all used and unused study drug. Subject was consented to [id number] on 11/13/19 all study procedures reviewed and all questions answered.\par \par Pt is here today for a sick visit. Pt was diagnosed with Influenza A on 1/29 at urgent care. Took one day of tamiflu and was vomiting and dry heaving so stopped taking. Took 20mg of prednisone for the last two days for ear pain which helped.  Had fevers none recently. Still having sweats, increased cough with more mucous production, green sputum, left ear pain, sinus pressure, runny nose.\par \par Influenza/Pulmonary Exacerbation \par - Bactrim 800-160 DS, TID x 14 days \par - f/u within the week to report symptom course consider adding another PO Antibiotics\par \par ACT: albuterol > HS 3% >Pulmozyme, Using Aerobika less than daily but more amenable to using it compared to his Vest Says he does not have time for it. Active at work walking up and down stairs.\par - Fev1 79% (1/13/20), 81% (12/11/19), 81% (11/26/19), 81% (11/13/19), 84% (6/2019)\par - REINFORCED to increase ACT \par \par CFRD: Last A1C 6.3. Poor compliance/adherance with Freeestyle yonathan and consistent insulin use. He put his sensor back on a for a few days BGs 100-240s with a few symptomatic lows after meals that he did not eat much during and then would drop. Is doing 2 Units novolog with lunch and dinner. \par - must f/u with Dr. Salazar, discussed importance of managing CFRD and potential complications, discussed other CGM options and possible insulin pump therapy to minimize his poor compliance with insulin and checking FSBG\par - Had apt with Dr. Salazar and cancelled AGAIN discussed he must f/u with Dr. Salazar and keep apt.\par \par Left eye pain with blurry vision \par - instructed to see Opthalmology \par - Stop Prednisone \par \par Nutrition: Using Creon as ordered 5-6 with meals, 3-4 with snacks, no issue.  No GI complaints today\par - Must f/u with Endo\par  \par Sleep-DIS using Ambien 5mg 2x/week and benefitting. Endorsing 15-30 minutes with Ambien to initiate sleep. \par \par ENT: Hasn't f/u with ENT, or repeated CT sinus. No acute sinus complaints\par - apt on 2/19 \par \par Bone Density 11/28/16' normal T score-1.3\par - repeat in 2021\par \par Urology/Conception: no Vas Deferens Here with wife today inquiring about conceiving, made pt and wife aware to notify provider immediately when considering sperm extraction he will have to be off modulator for minimum of 3 months prior to procedure. Not actively trying, still using barrier contraception advised to continue using protection.\par - Wife Christianne will have genetic testing in office (has no insurance at this time) discussed with pt and they agree to do testing once her insurance is straightened out\par - tasked  to help couple find appropriate plan \par \par Health Maintenance:\par Flu vaccine up to date 11/13/19 \par Pneumococcal 23 to be administered in 2020\par BD up to date repeat in 5 years\par CXR last in 2018 given Rx twice still has not gone, reinforced today \par Sputum CS today \par \par CARDIO: ordered ECHO for abnormal biphasic appearing pwaves on prior EKGs\par - Still has not gone reinforced has apt on 2/20\par \par f/u next week with symptoms

## 2020-02-07 ENCOUNTER — APPOINTMENT (OUTPATIENT)
Dept: ENDOCRINOLOGY | Facility: CLINIC | Age: 30
End: 2020-02-07

## 2020-02-19 ENCOUNTER — APPOINTMENT (OUTPATIENT)
Dept: OTOLARYNGOLOGY | Facility: CLINIC | Age: 30
End: 2020-02-19
Payer: COMMERCIAL

## 2020-02-19 VITALS — SYSTOLIC BLOOD PRESSURE: 126 MMHG | HEART RATE: 69 BPM | DIASTOLIC BLOOD PRESSURE: 76 MMHG

## 2020-02-19 VITALS — HEIGHT: 65 IN | BODY MASS INDEX: 22.99 KG/M2 | WEIGHT: 138 LBS

## 2020-02-19 DIAGNOSIS — H91.8X2 OTHER SPECIFIED HEARING LOSS, LEFT EAR: ICD-10-CM

## 2020-02-19 DIAGNOSIS — R94.5 ABNORMAL RESULTS OF LIVER FUNCTION STUDIES: ICD-10-CM

## 2020-02-19 DIAGNOSIS — H93.19 TINNITUS, UNSPECIFIED EAR: ICD-10-CM

## 2020-02-19 DIAGNOSIS — H93.8X3 OTHER SPECIFIED DISORDERS OF EAR, BILATERAL: ICD-10-CM

## 2020-02-19 PROCEDURE — 99215 OFFICE O/P EST HI 40 MIN: CPT | Mod: 25

## 2020-02-19 PROCEDURE — 92567 TYMPANOMETRY: CPT

## 2020-02-19 PROCEDURE — 31231 NASAL ENDOSCOPY DX: CPT

## 2020-02-19 PROCEDURE — 92557 COMPREHENSIVE HEARING TEST: CPT

## 2020-02-19 RX ORDER — SULFAMETHOXAZOLE AND TRIMETHOPRIM 800; 160 MG/1; MG/1
800-160 TABLET ORAL
Qty: 42 | Refills: 0 | Status: DISCONTINUED | COMMUNITY
Start: 2020-02-06 | End: 2020-02-19

## 2020-02-19 NOTE — PROCEDURE
[Image(s) Captured] : image(s) captured and filed [Oxymetazoline HCl] : oxymetazoline HCl [Topical Lidocaine] : topical lidocaine [Flexible Endoscope] : examined with the flexible endoscope [Serial Number: ___] : Serial Number: [unfilled] [Recalcitrant Symptoms] : recalcitrant symptoms  [Anterior rhinoscopy insufficient to account for symptoms] : anterior rhinoscopy insufficient to account for symptoms [Anatomical Abnormality] : anatomical abnormality [Nasal Septum] : no lesions [Nasal Septum Mucosa Bleeding] : no bleeding [Normal] : the nasopharynx was normal [___ cm] : [unfilled]Ucm polyp(s) on the left [FreeTextEntry6] : Pre-op indication(s): CF, Polyps\par Post-op indication(s): polyps\par Verbal consent obtained from patient.\par “Anterior rhinoscopy insufficient to account for symptoms” \par Details for procedure: \par Scope #: [id number]\par Type of scope:    flexible fiber optic telescope  X   Rigid glass telescope \par Anesthesia and/or vasoconstriction was achieved topically by using: \par 4% Lidocaine spray   0.05% Oxymetazoline     Other ______ \par The following anatomic sites were directly examined in a sequential fashion: \par The scope was introduced in the nasal passage between the middle and inferior turbinates to exam the inferior portion of the middle meatus and the fontanelle, as well as the maxillary ostia. Next, the scope was passed medially and posteriorly to the middle turbinates to examine the sphenoethmoid recess and the superior turbinate region. \par Upon visualization the finders are as follows: \par Nasal Septum:   Normal   \par Bleeding site cauterized:    Anterior   left   right   Posterior   left   right \par Method:   Silver Nitrate   YAG Laser    Electrocautery ______ \par Right Side: \par * Mucosa: Normal\par * Mucous: Normal\par * Polyp: Normal\par * Inferior Turbinate: Normal\par * Middle Turbinate: Normal\par * Superior Turbinate: Normal\par * Inferior Meatus: Normal\par * Middle Meatus: Normal\par * Super Meatus: Normal\par * Sphenoethmoidal Recess: Normal\par Left Side: \par * Mucosa: Normal\par * Mucous: Normal\par * Polyp: Normal\par * Inferior Turbinate: Normal\par * Middle Turbinate: Normal\par * Superior Turbinate: Normal\par * Inferior Meatus: Normal\par * Middle Meatus: polyps\par * Super Meatus: Normal\par * Sphenoethmoidal Recess: Normal\par The patient tolerated the procedure well without any complications.\par \par \par  [de-identified] : CF

## 2020-02-19 NOTE — CONSULT LETTER
[Dear  ___] : Dear  [unfilled], [Consult Letter:] : I had the pleasure of evaluating your patient, [unfilled]. [Please see my note below.] : Please see my note below. [Consult Closing:] : Thank you very much for allowing me to participate in the care of this patient.  If you have any questions, please do not hesitate to contact me. [Sincerely,] : Sincerely, [FreeTextEntry3] : Terrence Mendoza MD, OSEAS, FACS\par  Department Otolaryngology\par Director of [facility]\par Professor of Otolaryngology, \par [employer] [facility]

## 2020-02-19 NOTE — REASON FOR VISIT
[Subsequent Evaluation] : a subsequent evaluation for [FreeTextEntry2] : follow up sinus check up, otalgia

## 2020-02-19 NOTE — HISTORY OF PRESENT ILLNESS
[Ear Fullness] : ear fullness [de-identified] : 29 year old male, cystic fibrosis, follow up sinus check up, otalgia.  \[facility]\"" pulmonologist, Dilcia Wesley, recommended he have a sinus check up.  \[facility]\"" recently was on a plane from [place] 2/3/2020, had bilateral otalgia, left worse than right.  \[facility]\"" saw his nurse practitioner and was told he had a left ear infection, and upper respiratory infection, prescribed Bactrim, completed 2/14/2020.  \[facility]\"" cough and chest infection is better.  \[facility]\"" still has left ear pressure. Last audio 3/31/2017, ABR conducted 4/13/17.   Denies nasal congestion, sinus pain, sinus pressure, post nasal drip, nasal discharge.

## 2020-02-20 ENCOUNTER — OUTPATIENT (OUTPATIENT)
Dept: OUTPATIENT SERVICES | Facility: HOSPITAL | Age: 30
LOS: 1 days | End: 2020-02-20

## 2020-02-20 ENCOUNTER — APPOINTMENT (OUTPATIENT)
Dept: CV DIAGNOSITCS | Facility: HOSPITAL | Age: 30
End: 2020-02-20
Payer: COMMERCIAL

## 2020-02-20 DIAGNOSIS — R94.31 ABNORMAL ELECTROCARDIOGRAM [ECG] [EKG]: ICD-10-CM

## 2020-02-20 PROCEDURE — 93306 TTE W/DOPPLER COMPLETE: CPT | Mod: 26

## 2020-03-04 ENCOUNTER — APPOINTMENT (OUTPATIENT)
Dept: PULMONOLOGY | Facility: CLINIC | Age: 30
End: 2020-03-04

## 2020-03-10 ENCOUNTER — APPOINTMENT (OUTPATIENT)
Dept: ENDOCRINOLOGY | Facility: CLINIC | Age: 30
End: 2020-03-10
Payer: COMMERCIAL

## 2020-03-10 VITALS
WEIGHT: 133.05 LBS | HEIGHT: 65 IN | BODY MASS INDEX: 22.17 KG/M2 | OXYGEN SATURATION: 98 % | DIASTOLIC BLOOD PRESSURE: 70 MMHG | SYSTOLIC BLOOD PRESSURE: 110 MMHG | HEART RATE: 72 BPM

## 2020-03-10 LAB
GLUCOSE BLDC GLUCOMTR-MCNC: 86
HBA1C MFR BLD HPLC: 6.3

## 2020-03-10 PROCEDURE — 82962 GLUCOSE BLOOD TEST: CPT

## 2020-03-10 PROCEDURE — 99214 OFFICE O/P EST MOD 30 MIN: CPT | Mod: 25

## 2020-03-10 PROCEDURE — 83036 HEMOGLOBIN GLYCOSYLATED A1C: CPT | Mod: QW

## 2020-03-10 NOTE — PHYSICAL EXAM
[Alert] : alert [No Acute Distress] : no acute distress [Well Nourished] : well nourished [Well Developed] : well developed [Normal Sclera/Conjunctiva] : normal sclera/conjunctiva [EOMI] : extra ocular movement intact [No Proptosis] : no proptosis [Normal Oropharynx] : the oropharynx was normal [Thyroid Not Enlarged] : the thyroid was not enlarged [No Thyroid Nodules] : there were no palpable thyroid nodules [No Respiratory Distress] : no respiratory distress [No Accessory Muscle Use] : no accessory muscle use [Clear to Auscultation] : lungs were clear to auscultation bilaterally [Normal Rate] : heart rate was normal  [Normal S1, S2] : normal S1 and S2 [Regular Rhythm] : with a regular rhythm [Pedal Pulses Normal] : the pedal pulses are present [No Edema] : there was no peripheral edema [Normal Bowel Sounds] : normal bowel sounds [Not Tender] : non-tender [Soft] : abdomen soft [Not Distended] : not distended [Post Cervical Nodes] : posterior cervical nodes [Anterior Cervical Nodes] : anterior cervical nodes [Axillary Nodes] : axillary nodes [No Spinal Tenderness] : no spinal tenderness [Spine Straight] : spine straight [No Stigmata of Cushings Syndrome] : no stigmata of cushings syndrome [Normal Gait] : normal gait [Normal Strength/Tone] : muscle strength and tone were normal [No Rash] : no rash [Normal Reflexes] : deep tendon reflexes were 2+ and symmetric [No Tremors] : no tremors [Oriented x3] : oriented to person, place, and time [Acanthosis Nigricans] : no acanthosis nigricans [Normal] : normal [Full ROM] : with full range of motion [Diminished Throughout Both Feet] : normal tactile sensation with monofilament testing throughout both feet

## 2020-03-10 NOTE — HISTORY OF PRESENT ILLNESS
[FreeTextEntry1] : Patient is a 29-year-old male with history of cystic fibrosis diagnosed at age 6 months, with positive sweat, positive gene for delta 508 and 3905insT. patient also with a history of chronic sinusitis.\par Patient was followed by Dr. coello male, was last seen in the office in September 2017.  Establish care with me in March 2019.  Had not been following up for a while.\par Patient reported that he was diagnosed with elevated glucose since age 17.  He was treated with mealtime insulin only.  Currently he is only taking NovoLog 2 units with meal.  He reported that he had not been taking the insulin for a long time.  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  He has been out and about most of the day.  Therefore often forgetting to bring his insulin NovoLog pen with him.  He is interested to find out if there is an insulin pump that is available for him which will make his adherence to insulin therapy better.  He is also under research protocol for CF medication Trikafta.  \par He utilizes a freestyle yonathan sensor.\par He has not set up yonathan at length yet.  Forwarded some picture of his glycemic breakdown\par Greater than 27633%\par 141–240 24%\par 100–140   53%\par 70–99   11%\par <70 mg/dL 0%\par \par Regarding bones density, was diagnosed with osteopenia with a T score of -1.3, bone density was last done in November 26, 2016.\par \par Patient is using Creon as ordered.  No GI issues.\par \par Patient stated that he has just gotten over influenza infection.  His glucose has been slightly elevated.  Has been doing better.\par \par

## 2020-03-10 NOTE — ASSESSMENT
[FreeTextEntry1] : Mr. SOCORRO STEPHEN is 28 year old male here for evaluation of CFRD. \par \par #1 CFRD\par Patient is currently utilizing the freestyle sensor.  Which he really likes.  However he has not been very adherent with his mealtime insulin dose, NovoLog 2 units with meals, because he has been working outside mostly.  Sometimes forget to bring his insulin pen with him.  He is interested to find out about Omni pod system.  I think he will benefits from this as it can do 0 basal units on the system.  He really just needs mealtime coverage.  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  Recommend patient to follow-up with ophthalmologist.  Referral for ophthalmology was given for patient today.  Once patient obtained Omni pod system, he was set up an appointment with our certified diabetes educator for training.  We will set for basal of 0.0 units, manual bolus of 2 units with meals.  Will set up a correction factor of 1: 100 above 200 mg/dL.  Active insulin time 4 hours.  Glucose target 150\par \par #2 Vitamin D Deficiency\par Vitamin D in June 2019 was 27.9.  Recommend to bring the level above 30 ng/mm for bone health.  Continue with supplementations.\par

## 2020-03-12 ENCOUNTER — TRANSCRIPTION ENCOUNTER (OUTPATIENT)
Age: 30
End: 2020-03-12

## 2020-03-23 ENCOUNTER — RX RENEWAL (OUTPATIENT)
Age: 30
End: 2020-03-23

## 2020-04-28 ENCOUNTER — APPOINTMENT (OUTPATIENT)
Dept: ENDOCRINOLOGY | Facility: CLINIC | Age: 30
End: 2020-04-28

## 2020-04-28 ENCOUNTER — APPOINTMENT (OUTPATIENT)
Dept: ENDOCRINOLOGY | Facility: CLINIC | Age: 30
End: 2020-04-28
Payer: COMMERCIAL

## 2020-04-28 PROCEDURE — G0108 DIAB MANAGE TRN  PER INDIV: CPT | Mod: 95

## 2020-04-29 ENCOUNTER — APPOINTMENT (OUTPATIENT)
Dept: PULMONOLOGY | Facility: CLINIC | Age: 30
End: 2020-04-29

## 2020-05-07 ENCOUNTER — APPOINTMENT (OUTPATIENT)
Dept: PULMONOLOGY | Facility: CLINIC | Age: 30
End: 2020-05-07
Payer: COMMERCIAL

## 2020-05-07 VITALS — WEIGHT: 138 LBS | HEIGHT: 65 IN | BODY MASS INDEX: 22.99 KG/M2

## 2020-05-07 PROCEDURE — 99442: CPT

## 2020-07-14 ENCOUNTER — NON-APPOINTMENT (OUTPATIENT)
Age: 30
End: 2020-07-14

## 2020-07-18 ENCOUNTER — APPOINTMENT (OUTPATIENT)
Dept: DISASTER EMERGENCY | Facility: CLINIC | Age: 30
End: 2020-07-18

## 2020-07-19 LAB — SARS-COV-2 N GENE NPH QL NAA+PROBE: NOT DETECTED

## 2020-07-21 ENCOUNTER — APPOINTMENT (OUTPATIENT)
Dept: PULMONOLOGY | Facility: CLINIC | Age: 30
End: 2020-07-21
Payer: COMMERCIAL

## 2020-07-21 ENCOUNTER — APPOINTMENT (OUTPATIENT)
Dept: PULMONOLOGY | Facility: CLINIC | Age: 30
End: 2020-07-21

## 2020-07-21 DIAGNOSIS — G47.00 INSOMNIA, UNSPECIFIED: ICD-10-CM

## 2020-07-21 PROCEDURE — 99215 OFFICE O/P EST HI 40 MIN: CPT

## 2020-07-21 RX ORDER — BUDESONIDE 0.5 MG/2ML
0.5 INHALANT ORAL TWICE DAILY
Qty: 60 | Refills: 4 | Status: DISCONTINUED | COMMUNITY
Start: 2020-02-19 | End: 2020-07-21

## 2020-07-21 NOTE — HISTORY OF PRESENT ILLNESS
[Doing Well] : doing well 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 [MRSA] : MRSA [Other: ___] : [unfilled] [Pseudomonas] : Pseudomonas [___] : the last positive B.Cepacia result was [unfilled] [Last AFB Date: ___] : the AFB was performed  [unfilled] [None] : ~He/She~ has no hemoptysis [FVC ___] : the FVC was [unfilled] liters [] : vest daily [FEV1: ___] : the FEV1 was [unfilled] liters [Fair] : fair [Other ___] : exercise [unfilled] [Pancreatic Insufficiency] : pancreatic insufficiency [Pancreatic Enzyme Supp.] : uses pancreatic enzyme supplements [CFRD] : CFRD [Date: ___] : The last HgA1C was performed on [unfilled] [HgA1C Value: ___] : HgA1C value was [unfilled]  [AFB] : the patient had a MAC negative AFB [Headache] : no headache [Congestion] : no nasal congestion [Sinus Pain] : no sinus pain [Pancreatitis] : no pancreatitis [Diarrhea] : no diarrhea [Constipation] : no constipation [Steatorrhea] : no steatorrhea [de-identified] : This is a 30 y/o male CF pt who was dx'ed CF at 6 mo old, + sweat, + 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 Pt also has chronic sinusitis and PI.\par \par Pt is here today for well visit and Research f.u. Has been back to work since covid19 pandemic. No acute complaints. Denies change in cough, fevers, chills, sob, wheezing, abd pain, n/v/d. Pt is nonadherent to ACT, does not feel like it helps and that he only does it when he feels the need which has been over 2 months ago. \par \par Subject completed [id number] on 11/13/19. \par Research completed Week 36 visit for [id number]. Pt reports no adverse events or side effects from current study drug. Last dose was last night. No acute complaints, reports good compliance and returned all used and unused study drug.\par \par Influenza A 1/2020\par \par Nutrition: Using Creon as ordered, no issue.  No GI complaints today. 1-2 BMs daily\par \par Sleep-DIS using Ambien 5mg 2x/week and benefitting. Endorsing 15-30 minutes with Ambien to initiate sleep. \par \par ENT: Hasn't f/u with ENT, or repeated CT sinus, no acute sinus sxs at this time. \par \par Bone Density 11/28/16' normal T score-1.3\par \par Urology/Conception: no Vas Deferens Here with wife today inquiring about conceiving, made pt and wife aware to notify provider immediately when considering sperm extraction he will have to be off modulator for minimum of 3 months prior to procedure. Not actively trying to conceive still using condoms and protection. Wife had genetic testing done. \par \par Social:  2019, not eligible for [employer] due to income. Has been exercising more pe[place], swimming.

## 2020-07-21 NOTE — ASSESSMENT
[FreeTextEntry1] : ATTENDING ATTESTATION\par \par This is a 28 y/o male CF pt who was dx'ed CF at 6 mo old, + sweat, + 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 Pt also has chronic sinusitis and PI. Dx'ed CFRD recently, pt last saw Endo March 2019 and started Novolog AC 2units.\par \par Subject completed [id number] on 11/13/19.\par Research completed Week 36 visit for [id number]. Pt reports no adverse events or side effects from current study drug. Last dose was last night. No acute complaints, reports good compliance and returned all used and unused study drug. Subject was consented to [id number] on 11/13/19 all study procedures reviewed and all questions answered.\par \par here today for WELL visit and research, at respiratory baseline\par ACT: albuterol > HS 3% >Pulmozyme, Using Aerobika less than daily but more amenable to using it compared to his Vest Says he does not have time for it. Active at work walking up and down stairs.\par - Fev1 81% (7/21/20), 79% (1/13/20), 81% (12/11/19), 81% (11/26/19), 81% (11/13/19), 84% (6/2019)\par - REINFORCED to increase ACT and discussed the benefits of continuing his maintenance therapies\par \par CFRD: Last A1C 6.3. Poor compliance/adherance with Freeestyle yonathan and consistent insulin use. He put his sensor back on a for a few days BGs 100-240s with a few symptomatic lows after meals that he did not eat much during and then would drop. Is doing 2 Units novolog with lunch and dinner. \par - must f/u with Dr. Salazar, discussed importance of managing CFRD and potential complications, discussed other CGM options and possible insulin pump therapy to minimize his poor compliance with insulin and checking FSBG\par - f/u with Dr Salazar \par - Pending Omnipod shipment (met his deductible) \par \par Left eye pain with blurry vision resolved\par \par Nutrition: Using Creon as ordered 5-6 with meals, 3-4 with snacks, no issue.  No GI complaints today\par  \par Sleep-DIS using Ambien 5mg 2x/week and benefitting. Endorsing 15-30 minutes with Ambien to initiate sleep. \par \par ENT: Hasn't f/u with ENT, or repeated CT sinus. No acute sinus complaints\par \par Bone Density 11/28/16' normal T score-1.3\par - repeat in 2021\par \par Urology/Conception: no Vas Deferens Here with wife today inquiring about conceiving, made pt and wife aware to notify provider immediately when considering sperm extraction he will have to be off modulator for minimum of 3 months prior to procedure. Not actively trying, still using barrier contraception advised to continue using protection.\par - planning for retrieval around January 2021 \par - f/u Dr Josue urology \par \par Health Maintenance:\par Annual labs drawn today\par Flu vaccine 2019\par Pneumococcal 23 to be administered in 2020\par BD up to date repeat in 5 years\par CXR last in 2018 given Rx twice still has not gone, reinforced today \par Sputum CS today \par \par CARDIO Echo wnl mild mitral regurg\par \par discussed taking necessary precautions when at work and out of the house during Covid19 pandemic, recommended N95 mask and social distancing, good hand hygiene. avoid crowds. \par \par f/u in Oct as scheduled for research

## 2020-07-21 NOTE — PHYSICAL EXAM
[General Appearance - Well Developed] : well developed [Normal Conjunctiva] : the conjunctiva exhibited no abnormalities [No Oral Pallor] : no oral pallor [Normal Oral Mucosa] : normal oral mucosa [Normal Oropharynx] : normal oropharynx [Neck Cervical Mass (___cm)] : no neck mass was observed [Neck Appearance] : the appearance of the neck was normal [Heart Sounds] : normal S1 and S2 [Heart Rate And Rhythm] : heart rate was normal and rhythm regular [Murmurs] : no murmurs [Heart Sounds Gallop] : no gallops [Heart Sounds Pericardial Friction Rub] : no pericardial rub [Respiration, Rhythm And Depth] : normal respiratory rhythm and effort [Exaggerated Use Of Accessory Muscles For Inspiration] : no accessory muscle use [Bowel Sounds] : normal bowel sounds [Abdomen Mass (___ Cm)] : no abdominal mass palpated [Abdomen Soft] : soft [Abdomen Tenderness] : non-tender [Musculoskeletal - Swelling] : no joint swelling seen [Abnormal Walk] : normal gait [Cyanosis, Localized] : no localized cyanosis [No Focal Deficits] : no focal deficits [Skin Color & Pigmentation] : normal skin color and pigmentation [Oriented To Time, Place, And Person] : oriented to person, place, and time [Impaired Insight] : insight and judgment were intact [Affect] : the affect was normal [Normal Appearance] : normal appearance [Well Groomed] : well groomed [General Appearance - In No Acute Distress] : no acute distress [Auscultation Breath Sounds / Voice Sounds] : lungs were clear to auscultation bilaterally [] : no rash [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

## 2020-07-21 NOTE — END OF VISIT
[Time Spent: ___ minutes] : I have spent [unfilled] minutes of time on the encounter. [FreeTextEntry3] : I agree with the physician assistant's history, physical examination and plan of care. I personally elicited a history and examined the patient. See above attestation.\par \par \par \par \par  [>50% of the face to face encounter time was spent on counseling and/or coordination of care for ___] : Greater than 50% of the face to face encounter time was spent on counseling and/or coordination of care for [unfilled]

## 2020-07-22 LAB
24R-OH-CALCIDIOL SERPL-MCNC: 52.6 PG/ML
ALBUMIN SERPL ELPH-MCNC: 4.8 G/DL
ALP BLD-CCNC: 89 U/L
ALT SERPL-CCNC: 42 U/L
ANION GAP SERPL CALC-SCNC: 14 MMOL/L
APPEARANCE: CLEAR
AST SERPL-CCNC: 26 U/L
BASOPHILS # BLD AUTO: 0.03 K/UL
BASOPHILS NFR BLD AUTO: 0.6 %
BILIRUB SERPL-MCNC: 0.7 MG/DL
BILIRUBIN URINE: NEGATIVE
BLOOD URINE: NEGATIVE
BUN SERPL-MCNC: 15 MG/DL
CALCIUM SERPL-MCNC: 9.3 MG/DL
CHLORIDE SERPL-SCNC: 103 MMOL/L
CHOLEST SERPL-MCNC: 131 MG/DL
CHOLEST/HDLC SERPL: 3.3 RATIO
CO2 SERPL-SCNC: 23 MMOL/L
COLOR: YELLOW
CREAT SERPL-MCNC: 0.88 MG/DL
CREAT SPEC-SCNC: 214 MG/DL
EOSINOPHIL # BLD AUTO: 0.06 K/UL
EOSINOPHIL NFR BLD AUTO: 1.2 %
ESTIMATED AVERAGE GLUCOSE: 137 MG/DL
GLUCOSE QUALITATIVE U: ABNORMAL
GLUCOSE SERPL-MCNC: 148 MG/DL
HBA1C MFR BLD HPLC: 6.4 %
HCT VFR BLD CALC: 42.6 %
HDLC SERPL-MCNC: 39 MG/DL
HGB BLD-MCNC: 15.1 G/DL
IMM GRANULOCYTES NFR BLD AUTO: 0.2 %
KETONES URINE: NEGATIVE
LDLC SERPL CALC-MCNC: 76 MG/DL
LEUKOCYTE ESTERASE URINE: NEGATIVE
LYMPHOCYTES # BLD AUTO: 1.26 K/UL
LYMPHOCYTES NFR BLD AUTO: 24.7 %
MAN DIFF?: NORMAL
MCHC RBC-ENTMCNC: 30.9 PG
MCHC RBC-ENTMCNC: 35.4 GM/DL
MCV RBC AUTO: 87.1 FL
MICROALBUMIN 24H UR DL<=1MG/L-MCNC: <1.2 MG/DL
MICROALBUMIN/CREAT 24H UR-RTO: NORMAL MG/G
MONOCYTES # BLD AUTO: 0.38 K/UL
MONOCYTES NFR BLD AUTO: 7.4 %
NEUTROPHILS # BLD AUTO: 3.37 K/UL
NEUTROPHILS NFR BLD AUTO: 65.9 %
NITRITE URINE: NEGATIVE
PH URINE: 5.5
PLATELET # BLD AUTO: 222 K/UL
POTASSIUM SERPL-SCNC: 4.8 MMOL/L
PROT SERPL-MCNC: 7.1 G/DL
PROTEIN URINE: NORMAL
RBC # BLD: 4.89 M/UL
RBC # FLD: 12.4 %
SARS-COV-2 IGG SERPL IA-ACNC: 0.94 INDEX
SARS-COV-2 IGG SERPL QL IA: NEGATIVE
SODIUM SERPL-SCNC: 140 MMOL/L
SPECIFIC GRAVITY URINE: 1.03
TRIGL SERPL-MCNC: 75 MG/DL
UROBILINOGEN URINE: NORMAL
WBC # FLD AUTO: 5.11 K/UL

## 2020-07-23 LAB — TOTAL IGE SMQN RAST: 15 KU/L

## 2020-07-24 LAB
A-TOCOPHEROL VIT E SERPL-MCNC: 6.6 MG/L
BETA+GAMMA TOCOPHEROL SERPL-MCNC: 1.4 MG/L
VIT A SERPL-MCNC: 37.1 UG/DL

## 2020-07-27 ENCOUNTER — LABORATORY RESULT (OUTPATIENT)
Age: 30
End: 2020-07-27

## 2020-07-27 LAB
A FLAVUS AB FLD QL: NEGATIVE
A FUMIGATUS AB FLD QL: NEGATIVE
A NIGER AB FLD QL: NEGATIVE

## 2020-07-29 LAB — MENADIONE SERPL-MCNC: 1.05 NG/ML

## 2020-08-03 LAB — BACTERIA SPT CF RESP CULT: ABNORMAL

## 2020-08-11 ENCOUNTER — APPOINTMENT (OUTPATIENT)
Dept: ENDOCRINOLOGY | Facility: CLINIC | Age: 30
End: 2020-08-11

## 2020-08-18 ENCOUNTER — APPOINTMENT (OUTPATIENT)
Dept: UROLOGY | Facility: CLINIC | Age: 30
End: 2020-08-18
Payer: COMMERCIAL

## 2020-08-18 VITALS
HEIGHT: 65 IN | WEIGHT: 140 LBS | DIASTOLIC BLOOD PRESSURE: 74 MMHG | HEART RATE: 61 BPM | BODY MASS INDEX: 23.32 KG/M2 | SYSTOLIC BLOOD PRESSURE: 138 MMHG

## 2020-08-18 VITALS — TEMPERATURE: 97.7 F

## 2020-08-18 PROCEDURE — 99204 OFFICE O/P NEW MOD 45 MIN: CPT

## 2020-08-18 NOTE — DISCUSSION/SUMMARY
[FreeTextEntry1] : \par The patient returns for a followup. We reviewed his semen analysis which demonstrated a low-volume slightly fructose positive azoospermia. A test demonstrated a normal LH and FSH. His liver function studies were slightly elevated as was his white count. He is speaking with his medical doctor about this. We discussed various options for treatment of his azoospermia. These included testicular sperm retrieval at this time or in the future. His partner who is also 22 my have some issues with her reproductive system and will be evaluated by her gynecologist. I will see him back as needed when he decides on treatment course.\par \par Consultation: 30 minutes  10 minutes reviewing his history and performing a physical examination.  20 minutes discussing treatment options.\par \par \par

## 2020-08-18 NOTE — REVIEW OF SYSTEMS
[Feeling Poorly] : feeling poorly [Feeling Tired] : feeling tired [Cough] : cough [Negative] : Heme/Lymph

## 2020-08-18 NOTE — HISTORY OF PRESENT ILLNESS
[FreeTextEntry1] : The patient presents with his wife 6 years after his last visit here. 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 He would like to discuss fertility options.\par \par HPI: Patient presented with the chief complaint of history of cystic fibrosis and questioned about his fertility potential for evaluation. His girlfriend has not had any prior pregnancies. He reports that they have not been trying to have children, but have had unprotected intercourse had no pregnancies. .  His past medical history demonstrates a history of cystic fibrosis mild diabetes and asthma (see patient questionnaire).  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  He does not smoke and drinks only socially.  He has an allergy to fentanyl.  His review of systems is non-contributory. His family history is not significant.\par \par The patient reports that he has heard at cystic fibrosis can be associated with reproductive problems and would like to be evaluated. the patient is currently on an ambulatory intravenous antibiotic regimen for a recent pulmonary infection he reports he gets these types of infections approximately 4 times a year\par \par

## 2020-08-18 NOTE — LETTER BODY
[FreeTextEntry1] : Dear Dr. Dilcia Wesley,\par \par Thank you for referring your patient Kalpesh Lucero for consultation for azoospermia. I have requested several baseline blood studies. I will see the patient back in followup shortly and make further recommendations. I have attached a copy of my consultation note for your records.\par \par Thank you again for this kind referral. I will certainly keep you updated with further progress. Please do not hesitate to call me if you have any questions.\par \par Best regards,\par \par \par \par Jah Josue M.D., PhD\par Professor of Urology\par    [facility]\par  for Quality\par Director, Reproductive and Sexual Medicine\par    [facility]\par    [facility]\par

## 2020-08-18 NOTE — PHYSICAL EXAM
[General Appearance - Well Developed] : well developed [General Appearance - Well Nourished] : well nourished [General Appearance - In No Acute Distress] : no acute distress [Well Groomed] : well groomed [Normal Appearance] : normal appearance [Arterial Pulses Normal] : the pedal pulses were normal [Heart Rate And Rhythm] : Heart rate and rhythm were normal [Edema] : no peripheral edema [Respiration, Rhythm And Depth] : normal respiratory rhythm and effort [Exaggerated Use Of Accessory Muscles For Inspiration] : no accessory muscle use [Auscultation Breath Sounds / Voice Sounds] : lungs were clear to auscultation bilaterally [Chest Palpation] : palpation of the chest revealed no abnormalities [Bowel Sounds] : normal bowel sounds [Lungs Percussion] : the lungs were normal to percussion [Abdomen Soft] : soft [Abdomen Tenderness] : non-tender [Costovertebral Angle Tenderness] : no ~M costovertebral angle tenderness [Abdomen Hernia] : no hernia was discovered [Abdomen Mass (___ Cm)] : no abdominal mass palpated [Urethral Meatus] : meatus normal [Penis Abnormality] : normal circumcised penis [Urinary Bladder Findings] : the bladder was normal on palpation [Epididymis] : the epididymides were normal [Scrotum] : the scrotum was normal [Anus Abnormality] : the anus and perineum were normal [Testes Tenderness] : no tenderness of the testes [Testes Mass (___cm)] : there were no testicular masses [Normal Station and Gait] : the gait and station were normal for the patient's age [FreeTextEntry1] : vas deferens not palpable on the left, and palpable structure consistent with vas deferens  on the right [Skin Turgor] : supple [Skin Color & Pigmentation] : normal skin color and pigmentation [] : no rash [No Focal Deficits] : no focal deficits [Skin Lesions] : no skin lesions [Sensation] : the sensory exam was normal to light touch and pinprick [Motor Exam] : the motor exam was normal [Oriented To Time, Place, And Person] : oriented to person, place, and time [Affect] : the affect was normal [Mood] : the mood was normal [Not Anxious] : not anxious [Cervical Lymph Nodes Enlarged Anterior Bilaterally] : anterior cervical [Cervical Lymph Nodes Enlarged Posterior Bilaterally] : posterior cervical [No Palpable Adenopathy] : no palpable adenopathy [Supraclavicular Lymph Nodes Enlarged Bilaterally] : supraclavicular [Axillary Lymph Nodes Enlarged Bilaterally] : axillary [Femoral Lymph Nodes Enlarged Bilaterally] : femoral [Inguinal Lymph Nodes Enlarged Bilaterally] : inguinal

## 2020-08-18 NOTE — ASSESSMENT
[FreeTextEntry1] : The patient presents with his wife 6 years after his last visit here. 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 He would like to discuss fertility options.\par \par He is on a investigational medication for cystic fibrosis and is feeling great. However, he'll need to go off medication for 3 months prior to bilateral testicular biopsy for diagnosis, sperm retrieval in cryopreservation. He has not had hormonal blood studies done recently and I have ordered. His exam however was unchanged. We discussed the procedure of a bilateral TESE and the need for in vitro fertilization with single sperm injection. He and his wife ask questions which were answered to their satisfaction. I will plan for a scrotal ultrasound and scheduling the procedure when he knows better when he can go off medication. I will discuss his blood studies in 2 weeks by Telehealth.\par \par Consultation: 40 minutes  20 minutes reviewing his history and performing a physical examination.  20 minutes reviewing the proposed procedure for sperm retrieval, discussing treatment options, writing blood studies and writing his note.

## 2020-08-19 LAB
25(OH)D3 SERPL-MCNC: 33.8 NG/ML
APPEARANCE: CLEAR
BASOPHILS # BLD AUTO: 0.04 K/UL
BASOPHILS NFR BLD AUTO: 0.8 %
BILIRUBIN URINE: NEGATIVE
BLOOD URINE: NEGATIVE
COLOR: YELLOW
EOSINOPHIL # BLD AUTO: 0.04 K/UL
EOSINOPHIL NFR BLD AUTO: 0.8 %
ESTIMATED AVERAGE GLUCOSE: 137 MG/DL
ESTRADIOL SERPL-MCNC: 19 PG/ML
FSH SERPL-MCNC: 6.1 IU/L
GLUCOSE QUALITATIVE U: NEGATIVE
HBA1C MFR BLD HPLC: 6.4 %
HCT VFR BLD CALC: 43.6 %
HGB BLD-MCNC: 14.9 G/DL
IMM GRANULOCYTES NFR BLD AUTO: 0.2 %
KETONES URINE: NEGATIVE
LEUKOCYTE ESTERASE URINE: NEGATIVE
LH SERPL-ACNC: 6.7 IU/L
LYMPHOCYTES # BLD AUTO: 1.36 K/UL
LYMPHOCYTES NFR BLD AUTO: 25.8 %
MAN DIFF?: NORMAL
MCHC RBC-ENTMCNC: 30.1 PG
MCHC RBC-ENTMCNC: 34.2 GM/DL
MCV RBC AUTO: 88.1 FL
MONOCYTES # BLD AUTO: 0.36 K/UL
MONOCYTES NFR BLD AUTO: 6.8 %
NEUTROPHILS # BLD AUTO: 3.47 K/UL
NEUTROPHILS NFR BLD AUTO: 65.6 %
NITRITE URINE: NEGATIVE
PH URINE: 6
PLATELET # BLD AUTO: 254 K/UL
PROLACTIN SERPL-MCNC: 12.3 NG/ML
PROTEIN URINE: NEGATIVE
RBC # BLD: 4.95 M/UL
RBC # FLD: 12.7 %
SPECIFIC GRAVITY URINE: 1.02
TSH SERPL-ACNC: 2.45 UIU/ML
UROBILINOGEN URINE: NORMAL
WBC # FLD AUTO: 5.28 K/UL

## 2020-08-25 LAB
TESTOST BND SERPL-MCNC: 13.6 PG/ML
TESTOST SERPL-MCNC: 472 NG/DL

## 2020-09-11 ENCOUNTER — APPOINTMENT (OUTPATIENT)
Dept: PULMONOLOGY | Facility: CLINIC | Age: 30
End: 2020-09-11
Payer: SUBSIDIZED

## 2020-09-11 VITALS
HEART RATE: 63 BPM | OXYGEN SATURATION: 98 % | RESPIRATION RATE: 17 BRPM | SYSTOLIC BLOOD PRESSURE: 128 MMHG | TEMPERATURE: 98 F | BODY MASS INDEX: 22.99 KG/M2 | DIASTOLIC BLOOD PRESSURE: 78 MMHG | HEIGHT: 65 IN | WEIGHT: 138 LBS

## 2020-09-11 PROCEDURE — 36415 COLL VENOUS BLD VENIPUNCTURE: CPT

## 2020-09-11 NOTE — ASSESSMENT
[FreeTextEntry1] : ATTENDING ATTESTATION\par \par This is a 30 y/o male CF pt who was dx'ed CF at 6 mo old, + sweat, + 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 Pt also has chronic sinusitis and PI. Dx'ed CFRD recently, pt last saw Endo March 2019 and started Novolog AC 2units.\par \par Subject completed [id number] on 11/13/19.\par Research completed Week 36 visit for [id number] here for unscheduled visit. Pt reports no adverse events or side effects from current study drug. Last dose was last night. No acute complaints, reports good compliance and returned all used and unused study drug. Subject was consented to [id number] on 11/13/19 all study procedures reviewed and all questions answered.  Pt is planning a sperm extraction with Dr Josue in December will interrupt study drug for the next 90 days prior to the procedure. Sponsor has been made aware and subject would like to continue to particpate.\par \par here today for research, at respiratory baseline\par ACT: albuterol > HS 3% >Pulmozyme, Using Aerobika less than daily but more amenable to using it compared to his Vest Says he does not have time for it. Active at work walking up and down stairs.\par - Fev1 81% (7/21/20), 79% (1/13/20), 81% (12/11/19), 81% (11/26/19), 81% (11/13/19), 84% (6/2019)\par - REINFORCED to increase ACT and discussed the benefits of continuing his maintenance therapies especially during study drug interruption \par - TODAY 9/11/2020 will be his last dose of study drug to plan for a 90 interruption prior to procedure\par \par CFRD: Last A1C 6.4. Poor compliance/adherance with Freeestyle yonathan and consistent insulin use. He put his sensor back on a for a few days BGs 100-240s with a few symptomatic lows after meals that he did not eat much during and then would drop. Is doing 2 Units novolog with lunch and dinner. \par - must f/u with Dr. Salazar, discussed importance of managing CFRD and potential complications, discussed other CGM options and possible insulin pump therapy to minimize his poor compliance with insulin and checking FSBG\par - f/u with Dr Salazar \par - Pending Omnipod shipment (met his deductible) \par \par Left eye pain with blurry vision resolved\par \par Nutrition: Using Creon as ordered 5-6 with meals, 3-4 with snacks, no issue.  No GI complaints today\par  \par Sleep-DIS using Ambien 5mg 2x/week and benefitting. Endorsing 15-30 minutes with Ambien to initiate sleep. \par \par ENT: Hasn't f/u with ENT, or repeated CT sinus. No acute sinus complaints\par \par Bone Density 11/28/16' normal T score-1.3\par - repeat in 2021\par \par Urology/Conception: no Vas Deferens Here with wife today inquiring about conceiving, made pt and wife aware to notify provider immediately when considering sperm extraction he will have to be off modulator for minimum of 3 months prior to procedure. Not actively trying, still using barrier contraception advised to continue using protection.\par - planning for retrieval around January 2021 \par - f/u Dr Josue urology \par \par Health Maintenance:\par Annual labs drawn today\par Flu vaccine 2019\par Pneumococcal 23 to be administered in 2020\par BD up to date repeat in 5 years\par CXR last in 2018 given Rx twice still has not gone, reinforced today \par Sputum CS today \par \par CARDIO Echo wnl mild mitral regurg\par \par discussed taking necessary precautions when at work and out of the house during Covid19 pandemic, recommended N95 mask and social distancing, good hand hygiene. avoid crowds. \par \par f/u in Oct as scheduled for research and Clinic\par aware he will need covid swab prior

## 2020-09-11 NOTE — PHYSICAL EXAM
[Normal Appearance] : normal appearance [General Appearance - Well Developed] : well developed [General Appearance - In No Acute Distress] : no acute distress [Well Groomed] : well groomed [Normal Oral Mucosa] : normal oral mucosa [Normal Conjunctiva] : the conjunctiva exhibited no abnormalities [Normal Oropharynx] : normal oropharynx [No Oral Pallor] : no oral pallor [Neck Appearance] : the appearance of the neck was normal [Neck Cervical Mass (___cm)] : no neck mass was observed [Heart Rate And Rhythm] : heart rate was normal and rhythm regular [Heart Sounds Gallop] : no gallops [Murmurs] : no murmurs [Heart Sounds] : normal S1 and S2 [Heart Sounds Pericardial Friction Rub] : no pericardial rub [Respiration, Rhythm And Depth] : normal respiratory rhythm and effort [Exaggerated Use Of Accessory Muscles For Inspiration] : no accessory muscle use [Bowel Sounds] : normal bowel sounds [Abdomen Soft] : soft [Abdomen Mass (___ Cm)] : no abdominal mass palpated [Abdomen Tenderness] : non-tender [Abnormal Walk] : normal gait [Musculoskeletal - Swelling] : no joint swelling seen [Cyanosis, Localized] : no localized cyanosis [Skin Color & Pigmentation] : normal skin color and pigmentation [] : no rash [No Focal Deficits] : no focal deficits [Oriented To Time, Place, And Person] : oriented to person, place, and time [Impaired Insight] : insight and judgment were intact [Affect] : the affect was normal [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

## 2020-09-11 NOTE — HISTORY OF PRESENT ILLNESS
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 [MSSA] : MSSA [MRSA] : MRSA [Pseudomonas] : Pseudomonas [Other: ___] : [unfilled] [___] : the last positive Pseudomonas result was [unfilled] [Last AFB Date: ___] : the AFB was performed  [unfilled] [None] : ~He/She~ has no hemoptysis [FVC ___] : the FVC was [unfilled] liters [FEV1: ___] : the FEV1 was [unfilled] liters [] : vest daily [Other ___] : exercise [unfilled] [Fair] : fair [Pancreatic Insufficiency] : pancreatic insufficiency [Pancreatic Enzyme Supp.] : uses pancreatic enzyme supplements [Date: ___] : The last HgA1C was performed on [unfilled] [CFRD] : CFRD [HgA1C Value: ___] : HgA1C value was [unfilled]  [AFB] : the patient had a MAC negative AFB [Headache] : no headache [Congestion] : no nasal congestion [Sinus Pain] : no sinus pain [Pancreatitis] : no pancreatitis [Diarrhea] : no diarrhea [Constipation] : no constipation [Steatorrhea] : no steatorrhea [de-identified] : This is a 29 y/o male CF pt who was dx'ed CF at 6 mo old, + sweat, + 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 Pt also has chronic sinusitis and PI.\par \par Pt is here today for unscheduled  Research visit. Has been back to work since covid19 pandemic. No acute complaints. Denies change in cough, fevers, chills, sob, wheezing, abd pain, n/v/d. Pt is nonadherent to ACT, does not feel like it helps and that he only does it when he feels the need. Pt is planning a sperm extraction with Dr Josue in December will interrupt study drug for the next 90 days prior to the procedure. Sponsor has been made aware and subject would like to continue to particpate.  \par \par Subject completed [id number] on 11/13/19. \par Research completed Week 36 visit for [id number]. Pt reports no adverse events or side effects from current study drug. Last dose was last night. No acute complaints, reports good compliance and returned all used and unused study drug.\par \par Influenza A 1/2020\par \par Nutrition: Using Creon as ordered, no issue.  No GI complaints today. 1-2 BMs daily\par \par Sleep-DIS using Ambien 5mg 2x/week and benefitting. Endorsing 15-30 minutes with Ambien to initiate sleep. \par \par ENT: Hasn't f/u with ENT, or repeated CT sinus, no acute sinus sxs at this time. \par \par Bone Density 11/28/16' normal T score-1.3\par \par Urology/Conception: no Vas Deferens Here with wife today inquiring about conceiving, made pt and wife aware to notify provider immediately when considering sperm extraction he will have to be off modulator for minimum of 3 months prior to procedure. Not actively trying to conceive still using condoms and protection. Wife had genetic testing done. \par \par Social:  2019, not eligible for [employer] due to income. Has been exercising more [place], swimming.

## 2020-09-11 NOTE — REVIEW OF SYSTEMS
[Earache] : earache [Cough] : cough [PND] : PND [see HPI] : see HPI [Negative] : Heme/Lymph [Wheezing] : no wheezing [Shortness Of Breath] : no shortness of breath [SOB on Exertion] : no shortness of breath during exertion

## 2020-09-11 NOTE — END OF VISIT
[Time Spent: ___ minutes] : I have spent [unfilled] minutes of time on the encounter. [>50% of the face to face encounter time was spent on counseling and/or coordination of care for ___] : Greater than 50% of the face to face encounter time was spent on counseling and/or coordination of care for [unfilled] [FreeTextEntry3] : I agree with the physician assistant's history, physical examination and plan of care.\par

## 2020-09-16 ENCOUNTER — APPOINTMENT (OUTPATIENT)
Dept: UROLOGY | Facility: CLINIC | Age: 30
End: 2020-09-16
Payer: COMMERCIAL

## 2020-09-16 ENCOUNTER — APPOINTMENT (OUTPATIENT)
Dept: PULMONOLOGY | Facility: CLINIC | Age: 30
End: 2020-09-16
Payer: COMMERCIAL

## 2020-09-16 VITALS
HEIGHT: 65 IN | DIASTOLIC BLOOD PRESSURE: 71 MMHG | OXYGEN SATURATION: 100 % | SYSTOLIC BLOOD PRESSURE: 122 MMHG | RESPIRATION RATE: 16 BRPM | HEART RATE: 71 BPM

## 2020-09-16 PROCEDURE — 99214 OFFICE O/P EST MOD 30 MIN: CPT | Mod: 95

## 2020-09-16 PROCEDURE — 36415 COLL VENOUS BLD VENIPUNCTURE: CPT

## 2020-09-16 NOTE — ASSESSMENT
[FreeTextEntry1] : The patient returns for a telehealth appointment to review his recent blood studies and to discuss options for therapy.  He would also like to start the process for booking surgery in approximately 3 months.  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  Blood tests were essentially normal except for hemoglobin A1c which is elevated at 6.7.  He is under treatment for his diabetes.\par \par He would like to book for surgery and I have reached out to my surgery scheduler to arrange that.  He is on a investigational medication for cystic fibrosis and is feeling great. However, he'll need to go off medication for 3 months prior to bilateral testicular biopsy for diagnosis, sperm retrieval in cryopreservation. He has not had hormonal blood studies done recently and I have ordered. His exam however was unchanged. We discussed the procedure of a bilateral TESE and the need for in vitro fertilization with single sperm injection.\par \par Telehealth Consultation: 30 minutes  10 minutes reviewing his history and discussing prior results.  20 minutes discussing various treatment options, writing his note. There was also additional time in preparing for the visit and assisting the patient with technology issues he was having with the telehealth platform.\par Consultation: 40 minutes  20 minutes reviewing his history and performing a physical examination.  20 minutes reviewing the proposed procedure for sperm retrieval, discussing treatment options, writing blood studies and writing his note.

## 2020-09-16 NOTE — HISTORY OF PRESENT ILLNESS
[Home] : at home, [unfilled] , at the time of the visit. [Medical Office: ([facility])___] : at the medical office located in  [Verbal consent obtained from patient] : the patient, [unfilled] [FreeTextEntry1] : The patient-doctor. relationship has been established in a face-to-face fashion on real-time video audio HIPAA compliant communication using telemedicine software.  The patient's identity has been confirmed.  The patient was previously emailed a copy of the telemedicine consent.  The patient has had a chance to review and has now given verbal consent and has requested care to be assessed and treated through telemedicine. The patient understands there may be limitations in this process and that they need not need further follow-up care in the office and/or hospital settings. We were unable to use the [employer] platform and an alternative platform was utilized.\par \par Verbal consent was given on Wednesday, September 16, 2020 at 1:10 PM by the patient.  It was requested by the physician.  A written consent was previously sent for the patient to sign and return.\par \par The patient returns for a telehealth appointment to review his recent blood studies and to discuss options for therapy.  He would also like to start the process for booking surgery in approximately 3 months.  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 \par \par HPI: Patient presented with the chief complaint of history of cystic fibrosis and questioned about his fertility potential for evaluation. His girlfriend has not had any prior pregnancies. He reports that they have not been trying to have children, but have had unprotected intercourse had no pregnancies. .  His past medical history demonstrates a history of cystic fibrosis mild diabetes and asthma (see patient questionnaire).  In his present occupation with a [employer] he has no known toxin exposure.  He does not smoke and drinks only socially.  He has an allergy to fentanyl.  His review of systems is non-contributory. His family history is not significant.\par \par The patient reports that he has heard at cystic fibrosis can be associated with reproductive problems and would like to be evaluated. the patient is currently on an ambulatory intravenous antibiotic regimen for a recent pulmonary infection he reports he gets these types of infections approximately 4 times a year\par \par

## 2020-09-17 LAB
ALBUMIN SERPL ELPH-MCNC: 4.8 G/DL
ALP BLD-CCNC: 102 U/L
ALT SERPL-CCNC: 40 U/L
ANION GAP SERPL CALC-SCNC: 11 MMOL/L
APPEARANCE: CLEAR
AST SERPL-CCNC: 30 U/L
BACTERIA: NEGATIVE
BASOPHILS # BLD AUTO: 0.02 K/UL
BASOPHILS NFR BLD AUTO: 0.3 %
BILIRUB SERPL-MCNC: 0.7 MG/DL
BILIRUBIN URINE: NEGATIVE
BLOOD URINE: NEGATIVE
BUN SERPL-MCNC: 19 MG/DL
CALCIUM SERPL-MCNC: 9.3 MG/DL
CHLORIDE SERPL-SCNC: 103 MMOL/L
CK MB BLD-MCNC: 1.9 NG/ML
CK SERPL-CCNC: 234 U/L
CO2 SERPL-SCNC: 25 MMOL/L
COLOR: YELLOW
CREAT SERPL-MCNC: 0.9 MG/DL
EOSINOPHIL # BLD AUTO: 0.13 K/UL
EOSINOPHIL NFR BLD AUTO: 2.1 %
GLUCOSE QUALITATIVE U: NEGATIVE
GLUCOSE SERPL-MCNC: 124 MG/DL
HCT VFR BLD CALC: 46.2 %
HGB BLD-MCNC: 15.7 G/DL
HYALINE CASTS: 1 /LPF
IMM GRANULOCYTES NFR BLD AUTO: 0.3 %
KETONES URINE: NEGATIVE
LEUKOCYTE ESTERASE URINE: NEGATIVE
LYMPHOCYTES # BLD AUTO: 1.59 K/UL
LYMPHOCYTES NFR BLD AUTO: 25.1 %
MAN DIFF?: NORMAL
MCHC RBC-ENTMCNC: 30.1 PG
MCHC RBC-ENTMCNC: 34 GM/DL
MCV RBC AUTO: 88.5 FL
MICROSCOPIC-UA: NORMAL
MONOCYTES # BLD AUTO: 0.5 K/UL
MONOCYTES NFR BLD AUTO: 7.9 %
NEUTROPHILS # BLD AUTO: 4.07 K/UL
NEUTROPHILS NFR BLD AUTO: 64.3 %
NITRITE URINE: NEGATIVE
PH URINE: 5.5
PLATELET # BLD AUTO: 248 K/UL
POTASSIUM SERPL-SCNC: 4.9 MMOL/L
PROT SERPL-MCNC: 7.6 G/DL
PROTEIN URINE: NORMAL
RBC # BLD: 5.22 M/UL
RBC # FLD: 12.6 %
RED BLOOD CELLS URINE: 1 /HPF
SODIUM SERPL-SCNC: 139 MMOL/L
SPECIFIC GRAVITY URINE: 1.03
SQUAMOUS EPITHELIAL CELLS: 0 /HPF
UROBILINOGEN URINE: NORMAL
WBC # FLD AUTO: 6.33 K/UL
WHITE BLOOD CELLS URINE: 1 /HPF

## 2020-10-09 ENCOUNTER — APPOINTMENT (OUTPATIENT)
Dept: PULMONOLOGY | Facility: CLINIC | Age: 30
End: 2020-10-09
Payer: COMMERCIAL

## 2020-10-09 VITALS
DIASTOLIC BLOOD PRESSURE: 75 MMHG | BODY MASS INDEX: 22.16 KG/M2 | SYSTOLIC BLOOD PRESSURE: 125 MMHG | WEIGHT: 133 LBS | TEMPERATURE: 97.3 F | HEART RATE: 68 BPM | RESPIRATION RATE: 16 BRPM | HEIGHT: 65 IN

## 2020-10-09 LAB
ALBUMIN SERPL ELPH-MCNC: 4.4 G/DL
ALP BLD-CCNC: 92 U/L
ALT SERPL-CCNC: 19 U/L
ANION GAP SERPL CALC-SCNC: 13 MMOL/L
AST SERPL-CCNC: 13 U/L
BASOPHILS # BLD AUTO: 0.03 K/UL
BASOPHILS NFR BLD AUTO: 0.3 %
BILIRUB SERPL-MCNC: 0.5 MG/DL
BUN SERPL-MCNC: 15 MG/DL
CALCIUM SERPL-MCNC: 9.7 MG/DL
CHLORIDE SERPL-SCNC: 101 MMOL/L
CO2 SERPL-SCNC: 24 MMOL/L
CREAT SERPL-MCNC: 1.2 MG/DL
EOSINOPHIL # BLD AUTO: 0.13 K/UL
EOSINOPHIL NFR BLD AUTO: 1.2 %
GLUCOSE SERPL-MCNC: 122 MG/DL
HCT VFR BLD CALC: 43 %
HGB BLD-MCNC: 14.2 G/DL
IMM GRANULOCYTES NFR BLD AUTO: 0.4 %
LYMPHOCYTES # BLD AUTO: 1.44 K/UL
LYMPHOCYTES NFR BLD AUTO: 13.1 %
MAN DIFF?: NORMAL
MCHC RBC-ENTMCNC: 29.6 PG
MCHC RBC-ENTMCNC: 33 GM/DL
MCV RBC AUTO: 89.8 FL
MONOCYTES # BLD AUTO: 0.61 K/UL
MONOCYTES NFR BLD AUTO: 5.6 %
NEUTROPHILS # BLD AUTO: 8.71 K/UL
NEUTROPHILS NFR BLD AUTO: 79.4 %
PLATELET # BLD AUTO: 381 K/UL
POTASSIUM SERPL-SCNC: 5.2 MMOL/L
PROT SERPL-MCNC: 7.8 G/DL
RBC # BLD: 4.79 M/UL
RBC # FLD: 11.7 %
SODIUM SERPL-SCNC: 138 MMOL/L
WBC # FLD AUTO: 10.96 K/UL

## 2020-10-09 PROCEDURE — 99215 OFFICE O/P EST HI 40 MIN: CPT

## 2020-10-09 NOTE — PHYSICAL EXAM
[General Appearance - Well Developed] : well developed [Normal Appearance] : normal appearance [Well Groomed] : well groomed [General Appearance - In No Acute Distress] : no acute distress [Normal Conjunctiva] : the conjunctiva exhibited no abnormalities [Normal Oral Mucosa] : normal oral mucosa [No Oral Pallor] : no oral pallor [Normal Oropharynx] : normal oropharynx [Neck Appearance] : the appearance of the neck was normal [Neck Cervical Mass (___cm)] : no neck mass was observed [Heart Rate And Rhythm] : heart rate was normal and rhythm regular [Heart Sounds] : normal S1 and S2 [Heart Sounds Gallop] : no gallops [Murmurs] : no murmurs [Heart Sounds Pericardial Friction Rub] : no pericardial rub [Respiration, Rhythm And Depth] : normal respiratory rhythm and effort [Exaggerated Use Of Accessory Muscles For Inspiration] : no accessory muscle use [Bowel Sounds] : normal bowel sounds [Abdomen Soft] : soft [Abdomen Tenderness] : non-tender [Abdomen Mass (___ Cm)] : no abdominal mass palpated [Abnormal Walk] : normal gait [Musculoskeletal - Swelling] : no joint swelling seen [Cyanosis, Localized] : no localized cyanosis [Skin Color & Pigmentation] : normal skin color and pigmentation [] : no rash [No Focal Deficits] : no focal deficits [Oriented To Time, Place, And Person] : oriented to person, place, and time [Impaired Insight] : insight and judgment were intact [Affect] : the affect was normal [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

## 2020-10-09 NOTE — END OF VISIT
[Time Spent: ___ minutes] : I have spent [unfilled] minutes of time on the encounter. [>50% of the face to face encounter time was spent on counseling and/or coordination of care for ___] : Greater than 50% of the face to face encounter time was spent on counseling and/or coordination of care for [unfilled] [FreeTextEntry3] : I agree with the physician assistant's history, physical examination and plan of care.\par d/w patient at length regarding pursuing sperm extraction which he would now like to delay until Spring 2021 given limitations of insurance and how he became ill within 2 weeks of stopping modulator drug. at that point, would need to again stop drug 90 days prior to procedure.f/u sputum cultures, may consider initiating inhaled antibiotic at time of next attempt of stopping modulator. \par hold ACT for 48 hours from last episode of mild hemoptysis and resume with monitoring. Overall, patient reports feeling better on bactrim and resuming trikafta 10/8/2020. IV antibiotics not indicated at this time.\par f/u in OV next week with spirometry/research visit.\par

## 2020-10-09 NOTE — REVIEW OF SYSTEMS
[Earache] : earache [Cough] : cough [PND] : PND [see HPI] : see HPI [Negative] : Heme/Lymph [Shortness Of Breath] : no shortness of breath [Wheezing] : no wheezing [SOB on Exertion] : no shortness of breath during exertion

## 2020-10-09 NOTE — ASSESSMENT
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 Pt also has chronic sinusitis and PI. Dx'ed CFRD recently, pt last saw Endo March 2019 and started Novolog AC 2units.\par \par Subject completed [id number] on 11/13/19.\par Research enrolled in [id number] \par Will notify study team and sponsor of patient restarting study drug. \par \par ACUTE CF Exacerbation: Pt is here today for acute clinic visit. Pt had discontinued study drug on 9/21/2020 and resumed on 10/8/2020. Pt Called office earlier this week reported increased cough, chest pain, hemoptysis. Patient is currently on day 4 of PO Bactrim was feeling some improvement on antibiotics, also completed 3 days of VitK. Had another episode of gross hemoptysis yesterday about 1 teaspoon that woke him from sleep. Has been holding ACT and vest, only performing Pulmozyme BID.\par \par ACT: albuterol > HS 3% >Pulmozyme, Using Aerobika less than daily but more amenable to using it compared to his Vest Says he does not have time for it. Active at work walking up and down stairs.\par - Fev1 81% (7/21/20), 79% (1/13/20), 81% (12/11/19), 81% (11/26/19), 81% (11/13/19), 84% (6/2019)\par - Continue to hold ACT for 48 hours from last hemoptysis can resume tomorrow\par - If hemoptysis recurs contact provider\par - Continue PO Bactrim  \par - Pt would like to continue to participate in the study at this time and feels greater benefit while being on study drug. \par - Sputum CS today \par - CBC with Diff, CMP and Vit K \par - Given CXR rx again today (to be done before next weeks visit) \par \par CFRD: Last A1C 6.4. Poor compliance/adherance with Freeestyle yonathan and consistent insulin use. He put his sensor back on a for a few days BGs 100-240s with a few symptomatic lows after meals that he did not eat much during and then would drop. Is doing 2 Units novolog with lunch and dinner. \par - must f/u with Dr. Salazar, discussed importance of managing CFRD and potential complications, discussed other CGM options and possible insulin pump therapy to minimize his poor compliance with insulin and checking FSBG\par - f/u with Dr Salazar \par - Pending Omnipod shipment (met his deductible) \par \par Left eye pain with blurry vision resolved\par \par Nutrition: Using Creon as ordered 5-6 with meals, 3-4 with snacks, no issue.  No GI complaints today\par \par Sleep-DIS using Ambien 5mg 2x/week and benefitting. Endorsing 15-30 minutes with Ambien to initiate sleep. \par \par ENT: Hasn't f/u with ENT, or repeated CT sinus. + Sinus HA\par \par Bone Density 11/28/16' normal T score-1.3\par - repeat in 2021\par \par Urology/Conception: no Vas Deferens Pt has postponed his sperm extraction until next spring (2021) pt prefers to be more optimized and will have large deductible in the new year. Pt aware he will have to be off modulator for minimum of 3 months prior to procedure. Not actively trying to conceive still using condoms and protection. Wife had genetic testing done. \par - f/u Dr Josue urology \par \par Health Maintenance:\par Annual labs drawn today\par Flu vaccine 2019\par Pneumococcal 23 to be administered in 2020\par BD up to date repeat in 5 years\par CXR last in 2018 given Rx AGAIN still has not gone, reinforced today \par \par CARDIO Echo wnl mild mitral regurg\par \par discussed taking necessary precautions when at work and out of the house during Covid19 pandemic, recommended N95 mask and social distancing, good hand hygiene. avoid crowds. \par \par f/u in 1 week for research \par will have aisha for study visit and scheduled for covid swab prior

## 2020-10-09 NOTE — HISTORY OF PRESENT ILLNESS
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 [Pseudomonas] : Pseudomonas [Other: ___] : [unfilled] [___] : the last positive Pseudomonas result was [unfilled] [Last AFB Date: ___] : the AFB was performed  [unfilled] [None] : ~He/She~ has no hemoptysis [FVC ___] : the FVC was [unfilled] liters [FEV1: ___] : the FEV1 was [unfilled] liters [] : vest daily [Other ___] : exercise [unfilled] [Fair] : fair [Pancreatic Insufficiency] : pancreatic insufficiency [Pancreatic Enzyme Supp.] : uses pancreatic enzyme supplements [CFRD] : CFRD [Date: ___] : The last HgA1C was performed on [unfilled] [HgA1C Value: ___] : HgA1C value was [unfilled]  [Doing Poorly] : doing poorly [AFB] : the patient had a MAC negative AFB [Headache] : no headache [Congestion] : no nasal congestion [Sinus Pain] : no sinus pain [Pancreatitis] : no pancreatitis [Diarrhea] : no diarrhea [Constipation] : no constipation [Steatorrhea] : no steatorrhea [de-identified] : This is a 31 y/o male CF pt who was dx'ed CF at 6 mo old, + sweat, + 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 Pt also has chronic sinusitis and PI.\par \par Pt is here today for acute clinic visit. Pt had discontinued study drug on 9/21/2020. Called office earlier this week reported increased cough, chest pain, hemoptysis. Patient is currently on day 4 of PO Bactrim was feeling some improvement on antibiotics, also completed 3 days of VitK. Had another episode of gross hemoptysis yesterday about 1 teaspoon that woke him from sleep. Has been holding ACT and vest, only performing Pulmozyme BID.  + Joint pains, bodyaches, chills, sweats, chest tightness, sob, decreased appetite, lost 5 lbs since last visit, sinus HA 2 days ago took tylenol resolved. Patient was planning for sperm extraction which can not be performed until MidJanuary with current Urologist. Pt self resumed study drug yesterday first dose around noon. Started to feel cough became more productive and easier to expectorate and mucous was thinner still yellow. Research visit. Has been back to work since covid19 pandemic. Denies fevers, wheezing, abd pain, n/v/d. Thrush improved with nystatin using advair with spacer and rinsing mouth seems to help\par \par Subject completed [id number] on 11/13/19. \par Research [id number]. Pt reports no adverse events or side effects from current study drug. Subject had lapse in treatment and was off of study drug from 9/21/2020-10/7/2020. Resumed triple therapy on 10/8/20.\par Influenza A 1/2020\par \par Nutrition: Using Creon as ordered, no issue.  No GI complaints today. 1-2 BMs daily\par \par Sleep-DIS using Ambien 5mg 2x/week and benefitting. Endorsing 15-30 minutes with Ambien to initiate sleep. \par \par ENT: Hasn't f/u with ENT, or repeated CT sinus, + sinus HA\par \par Bone Density 11/28/16' normal T score-1.3\par \par Urology/Conception: no Vas Deferens Pt has postponed his sperm extraction until next spring (2021) pt prefers to be more optimized and will have large deductible in the new year. Pt aware he will have to be off modulator for minimum of 3 months prior to procedure. Not actively trying to conceive still using condoms and protection. Wife had genetic testing done. \par \par Social:  2019, not eligible for [employer] due to income. Has been exercising more [place], swimming.

## 2020-10-10 ENCOUNTER — APPOINTMENT (OUTPATIENT)
Dept: RADIOLOGY | Facility: CLINIC | Age: 30
End: 2020-10-10
Payer: COMMERCIAL

## 2020-10-10 ENCOUNTER — RESULT REVIEW (OUTPATIENT)
Age: 30
End: 2020-10-10

## 2020-10-10 ENCOUNTER — OUTPATIENT (OUTPATIENT)
Dept: OUTPATIENT SERVICES | Facility: HOSPITAL | Age: 30
LOS: 1 days | End: 2020-10-10
Payer: COMMERCIAL

## 2020-10-10 DIAGNOSIS — E84.9 CYSTIC FIBROSIS, UNSPECIFIED: ICD-10-CM

## 2020-10-10 PROCEDURE — 71046 X-RAY EXAM CHEST 2 VIEWS: CPT | Mod: 26

## 2020-10-10 PROCEDURE — 71046 X-RAY EXAM CHEST 2 VIEWS: CPT

## 2020-10-13 ENCOUNTER — APPOINTMENT (OUTPATIENT)
Dept: DISASTER EMERGENCY | Facility: CLINIC | Age: 30
End: 2020-10-13

## 2020-10-13 LAB — SARS-COV-2 N GENE NPH QL NAA+PROBE: NOT DETECTED

## 2020-10-14 ENCOUNTER — APPOINTMENT (OUTPATIENT)
Dept: PULMONOLOGY | Facility: CLINIC | Age: 30
End: 2020-10-14

## 2020-10-15 LAB — BACTERIA SPT CF RESP CULT: ABNORMAL

## 2020-10-16 ENCOUNTER — APPOINTMENT (OUTPATIENT)
Dept: PULMONOLOGY | Facility: CLINIC | Age: 30
End: 2020-10-16

## 2020-10-16 VITALS
HEIGHT: 65 IN | RESPIRATION RATE: 15 BRPM | BODY MASS INDEX: 22.57 KG/M2 | DIASTOLIC BLOOD PRESSURE: 76 MMHG | SYSTOLIC BLOOD PRESSURE: 123 MMHG | HEART RATE: 83 BPM | TEMPERATURE: 97.2 F | OXYGEN SATURATION: 99 % | WEIGHT: 135.5 LBS

## 2020-10-16 DIAGNOSIS — Z23 ENCOUNTER FOR IMMUNIZATION: ICD-10-CM

## 2020-10-19 LAB — MENADIONE SERPL-MCNC: 0.49 NG/ML

## 2020-10-19 NOTE — PHYSICAL EXAM
[General Appearance - Well Developed] : well developed [General Appearance - In No Acute Distress] : no acute distress [Normal Appearance] : normal appearance [Well Groomed] : well groomed [Normal Conjunctiva] : the conjunctiva exhibited no abnormalities [No Oral Pallor] : no oral pallor [Normal Oral Mucosa] : normal oral mucosa [Neck Appearance] : the appearance of the neck was normal [Normal Oropharynx] : normal oropharynx [Heart Rate And Rhythm] : heart rate was normal and rhythm regular [Neck Cervical Mass (___cm)] : no neck mass was observed [Murmurs] : no murmurs [Heart Sounds] : normal S1 and S2 [Heart Sounds Gallop] : no gallops [Heart Sounds Pericardial Friction Rub] : no pericardial rub [Exaggerated Use Of Accessory Muscles For Inspiration] : no accessory muscle use [Respiration, Rhythm And Depth] : normal respiratory rhythm and effort [Bowel Sounds] : normal bowel sounds [Abdomen Soft] : soft [Abdomen Mass (___ Cm)] : no abdominal mass palpated [Abnormal Walk] : normal gait [Abdomen Tenderness] : non-tender [Musculoskeletal - Swelling] : no joint swelling seen [Cyanosis, Localized] : no localized cyanosis [Skin Color & Pigmentation] : normal skin color and pigmentation [No Focal Deficits] : no focal deficits [] : no rash [Affect] : the affect was normal [Impaired Insight] : insight and judgment were intact [Oriented To Time, Place, And Person] : oriented to person, place, and time [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

## 2020-10-19 NOTE — ASSESSMENT
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 Pt also has chronic sinusitis and PI. Dx'ed CFRD recently, pt last saw Endo March 2019 and started Novolog AC 2units.\par \par Pt is here today for research visit Week #48 [id number]. Pt had discontinued study drug on 9/21/2020-10/7/20. And resumed on 10/8/20. Patient was planning for sperm extraction now delayed until Spring of 2021. Pt was seen last week for CF exacerbation with associated hemoptysis while off of study drug. Pt self discontinued PO Bactrim after 7 days because he was feeling better. Still has not been doing ACT regularly\par \par Subject completed [id number] on 11/13/19.\par Research enrolled in [id number] - Pt would like to continue to participate in the study at this time and feels greater benefit while being on study drug\par - Pt Will notify study team and sponsor when planning to stop study drug 90 days prior to procedure\par \par ACT: albuterol > HS 3% >Pulmozyme, Using Aerobika less than daily but more amenable to using it compared to his Vest Says he does not have time for it. Active at work walking up and down stairs.\par - Fev1 78% (10/16/20),  81% (7/21/20), 79% (1/13/20), 81% (12/11/19), 81% (11/26/19), 81% (11/13/19), 84% (6/2019)\par - REINFORCED TODAY the importance of pt performing ACT daily \par - If hemoptysis recurs contact provider\par \par ID: Chronic MRSA and Burkholderia multivor. \par - Discussed targeting MRSA as likely causative agent for exacerbations pt amenable to nebulized antibiotics MRSA greater quantity on recent cultures. Burkholderia (S) to [place] can consider if needed. \par - Start inhaled Vancomycin (pt says itchy throat was mild) can consider further dilution if necessary \par - Will have 1st dose in office with post administration observation \par - Advised annual audiometry testing \par \par CXR:10/10/20 Reticular opacities and bronchiectasis slight improvement in L mid to lower lung periphery but other wise no significant internal change \par \par CFRD: Last A1C 6.4. Poor compliance/adherance with Freeestyle yonathan and consistent insulin use. IIs doing 2 Units novolog with lunch and dinner. Pending Omnipod insulin pump but high out of pocket cost. \par - must f/u with Dr. Salazar, discussed importance of managing CFRD and potential complications, discussed other CGM options and possible insulin pump therapy to minimize his poor compliance with insulin and checking FSBG\par \par Left eye pain with blurry vision resolved\par - pt has been advised multiple times to schedule f/u with opthalmology \par \par Nutrition: Using Creon as ordered 5-6 with meals, 3-4 with snacks, no issue.  No GI complaints today\par \par Sleep-DIS using Ambien 5mg 2x/week and benefitting. Endorsing 15-30 minutes with Ambien to initiate sleep. \par \par ENT: Hasn't f/u with ENT, or repeated CT sinus.\par \par Bone Density 11/28/16' normal T score-1.3\par - repeat in 2021\par \par Urology/Conception: no Vas Deferens Pt has postponed his sperm extraction until next spring (2021) pt prefers to be more optimized and will have large deductible in the new year. Pt aware he will have to be off modulator for minimum of 3 months prior to procedure. Not actively trying to conceive still using condoms and protection. Wife had genetic testing done. \par - f/u Dr Josue urology \par \par Health Maintenance:\par Flu vaccine 10/2020 left im delt\par Pneumococcal 23 to be administered in 2020\par BD up to date repeat in 5 years\par CXR 2020 \par \par CARDIO Echo wnl mild mitral regurg\par \par discussed taking necessary precautions when at work and out of the house during Covid19 pandemic, recommended N95 mask and social distancing, good hand hygiene. avoid crowds. \par \par f/u in 1 month

## 2020-10-19 NOTE — HISTORY OF PRESENT ILLNESS
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 [Pseudomonas] : Pseudomonas [MRSA] : MRSA [Other: ___] : [unfilled] [___] : the last positive B.Cepacia result was [unfilled] [Last AFB Date: ___] : the AFB was performed  [unfilled] [None] : no cough reported [FVC ___] : the FVC was [unfilled] liters [] : vest daily [FEV1: ___] : the FEV1 was [unfilled] liters [Other ___] : exercise [unfilled] [Fair] : fair [Pancreatic Insufficiency] : pancreatic insufficiency [Pancreatic Enzyme Supp.] : uses pancreatic enzyme supplements [CFRD] : CFRD [Date: ___] : The last HgA1C was performed on [unfilled] [HgA1C Value: ___] : HgA1C value was [unfilled]  [Doing Well] : doing well [AFB] : the patient had a MAC negative AFB [Headache] : no headache [Congestion] : no nasal congestion [Pancreatitis] : no pancreatitis [Sinus Pain] : no sinus pain [Diarrhea] : no diarrhea [Constipation] : no constipation [Steatorrhea] : no steatorrhea [de-identified] : This is a 29 y/o male CF pt who was dx'ed CF at 6 mo old, + sweat, + 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 Pt also has chronic sinusitis and PI.\par \par Pt is here today for research visit Week #48 [id number]. Pt had discontinued study drug on 9/21/2020-10/7/20. And resumed on 10/8/20. Patient was planning for sperm extraction now delayed until Spring of 2021. Pt was seen last week for CF exacerbation with associated hemoptysis while off of study drug. Pt self discontinued PO Bactrim after 7 days because he was feeling better. Still has not been doing ACT regularly. Appetite improving. Feels almost back to respiratory baseline. No recurrence of hemoptysis. Mucous thinner and cough is rare since being back on study drug Denies fevers, chills, sob, wheezing, abd pain, n/v/d.joint pains, bodyaches PND, sinus congestion, sore throat..still yellow. Working since covid19 pandemic.Thrush improved with nystatin using advair with spacer and rinsing mouth seems to help\par \par Subject completed [id number] on 11/13/19. \par Research [id number]. Pt reports no adverse events or side effects from current study drug. Subject had lapse in treatment and was off of study drug from 9/21/2020-10/7/2020. Resumed triple therapy on 10/8/20.\par Influenza A 1/2020\par \par Nutrition: Using Creon as ordered, no issue.  No GI complaints today. 1-2 BMs daily\par \par Sleep-DIS using Ambien 5mg 2x/week and benefitting. Endorsing 15-30 minutes with Ambien to initiate sleep. \par \par ENT: Hasn't f/u with ENT, or repeated CT sinus,. Thrush resolved with spacer and nystatin \par \par Bone Density 11/28/16' normal T score-1.3\par \par Urology/Conception: no Vas Deferens Pt has postponed his sperm extraction until next spring (2021) pt prefers to be more optimized and will have large deductible in the new year. Pt aware he will have to be off modulator for minimum of 3 months prior to procedure. Not actively trying to conceive still using condoms and protection. Wife had genetic testing done. \par \par Social:  2019, not eligible for [facility] due to income. vilma Castañeda.

## 2020-10-19 NOTE — END OF VISIT
[Time Spent: ___ minutes] : I have spent [unfilled] minutes of time on the encounter. [>50% of the face to face encounter time was spent on counseling and/or coordination of care for ___] : Greater than 50% of the face to face encounter time was spent on counseling and/or coordination of care for [unfilled] [FreeTextEntry3] : I agree with the physician assistant's history, physical examination and plan of care.\par d/w patient at length regarding pursuing sperm extraction which he would now like to delay until Spring 2021 given limitations of insurance and how he became ill within 2 weeks of stopping modulator drug. at that point, would need to again stop drug 90 days prior to procedure.f/u sputum cultures. d/w pt regarding inhaled vancomycin, pt believes it was tolerable. as pt has MRSA persistent. B. mulitovarans is another bacteria that could be targeted for consideration. f/u in OV for post-nebulization monitoring of inh vanco.\par

## 2020-10-20 ENCOUNTER — NON-APPOINTMENT (OUTPATIENT)
Age: 30
End: 2020-10-20

## 2021-01-05 ENCOUNTER — APPOINTMENT (OUTPATIENT)
Dept: DISASTER EMERGENCY | Facility: CLINIC | Age: 31
End: 2021-01-05

## 2021-01-07 LAB — SARS-COV-2 N GENE NPH QL NAA+PROBE: NOT DETECTED

## 2021-01-08 ENCOUNTER — APPOINTMENT (OUTPATIENT)
Dept: PULMONOLOGY | Facility: CLINIC | Age: 31
End: 2021-01-08

## 2021-01-08 ENCOUNTER — APPOINTMENT (OUTPATIENT)
Dept: PULMONOLOGY | Facility: CLINIC | Age: 31
End: 2021-01-08
Payer: COMMERCIAL

## 2021-01-08 ENCOUNTER — NON-APPOINTMENT (OUTPATIENT)
Age: 31
End: 2021-01-08

## 2021-01-08 VITALS
TEMPERATURE: 98.9 F | RESPIRATION RATE: 16 BRPM | SYSTOLIC BLOOD PRESSURE: 139 MMHG | HEIGHT: 65 IN | HEART RATE: 97 BPM | OXYGEN SATURATION: 99 % | WEIGHT: 135 LBS | BODY MASS INDEX: 22.49 KG/M2 | DIASTOLIC BLOOD PRESSURE: 80 MMHG

## 2021-01-08 DIAGNOSIS — J45.909 UNSPECIFIED ASTHMA, UNCOMPLICATED: ICD-10-CM

## 2021-01-08 PROCEDURE — 99215 OFFICE O/P EST HI 40 MIN: CPT

## 2021-01-08 PROCEDURE — 99072 ADDL SUPL MATRL&STAF TM PHE: CPT

## 2021-01-08 RX ORDER — FLUTICASONE PROPIONATE AND SALMETEROL XINAFOATE 230; 21 UG/1; UG/1
230-21 AEROSOL, METERED RESPIRATORY (INHALATION)
Qty: 3 | Refills: 3 | Status: DISCONTINUED | COMMUNITY
Start: 2019-02-27 | End: 2021-01-08

## 2021-01-08 RX ORDER — NEBULIZER
EACH MISCELLANEOUS
Qty: 1 | Refills: 0 | Status: ACTIVE | COMMUNITY
Start: 2021-01-08 | End: 1900-01-01

## 2021-01-08 NOTE — END OF VISIT
[>50% of the face to face encounter time was spent on counseling and/or coordination of care for ___] : Greater than 50% of the face to face encounter time was spent on counseling and/or coordination of care for [unfilled] [FreeTextEntry3] : I agree with the advanced clinical provider's history, physical examination and plan of care. I personally elicited a history and examined the patient. See above attestation.\par \par Still trying to appeal to insurance to obtain coverage for inhaled vancomycin. In the meantime, will order [place] for GN coverage, start as soon as he gest it. He is agreeable to postponing sperm extraction procedure by 1 month to optimize his respiratory status. Other measures could be to do as needed PO antibiotics if he developed Pex while off trikafta. He did not finish a PO antibiotic course last Pex and went back to trikafta. Pt wishes to hold trikafta prior to procedure. D/w wife on phone as well. Again, reinforced importance of DM control, checking FS; resume Advair, resume ACT BID to TID..  [Time Spent: ___ minutes] : I have spent [unfilled] minutes of time on the encounter.

## 2021-01-08 NOTE — ASSESSMENT
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 Pt also has chronic sinusitis and PI. Dx'ed CFRD recently, pt last saw Endo March 2019 and started Novolog AC 2units.\par \par Pt is here today for research visit Week #60 [id number] and clinic visit. Pt had discontinued study drug on 9/21/2020-10/7/20. And resumed on 10/8/20. Patient was planning for sperm extraction Spring of 2021. Doing well at respiratory baseline. \par \par Subject completed [id number] on 11/13/19.\par Research enrolled in [id number] - Pt would like to continue to participate in the study at this time and feels greater benefit while being on study drug\par - Pt Will notify study team and sponsor when planning to stop study drug 90 days prior to procedure\par - Would like to continue participating in study \par - discussed risk and benefits for patient and potential fetus of being on modulator during sperm extraction and advised to hold triple therapy 90 days before\par \par ACT: albuterol > HS 3% >Pulmozyme, Using Aerobika less than daily but more amenable to using it compared to his Vest Says he does not have time for it. Active at work walking up and down stairs.\par - Fev1 84% (1/8/20), 78% (10/16/20),  81% (7/21/20), 79% (1/13/20), 81% (12/11/19), 81% (11/26/19), 81% (11/13/19), 84% (6/2019)\par - REINFORCED TODAY the importance of pt performing ACT daily and up to TID \par - Restart Advair \par \par ID: Chronic MRSA and Burkholderia multivor. \par - Discussed targeting MRSA as likely causative agent for exacerbations pt amenable to nebulized antibiotics MRSA greater quantity on recent cultures. Burkholderia (S) to [place] can consider if needed. \par Tolerated Bethkis and tobramycin in past, did not tolerate TOBIpod, [place] was also tolerated. \par - Start inhaled Vancomycin (pt says itchy throat was mild) can consider further dilution if necessary \par - Will have 1st dose in office with post administration observation \par - Advised annual audiometry testing \par - Start [place] \par - Vancomycin inh not covered - will continue to try and get \par \par CXR:10/10/20 Reticular opacities and bronchiectasis slight improvement in L mid to lower lung periphery but other wise no significant internal change \par \par CFRD: Last A1C 6.4. Poor compliance/adherance with Freestyle yonathan and consistent insulin NOT using 2 Units novolog with lunch and dinner. Pending Omnipod insulin pump but high out of pocket cost. \par - must f/u with Dr. Salazar, discussed importance of managing CFRD and potential complications, discussed other CGM options and possible insulin pump therapy to minimize his poor compliance with insulin and checking FSBG\par - discussed in dept with pt about long term complications of uncontrolled CFRD. \par - MUST f/u with Dr Salazar\par \par Nutrition: Using Creon as ordered 5-6 with meals, 3-4 with snacks, no issue.  No GI complaints today\par \par Sleep-DIS using Ambien 5mg 2x/week and benefitting. Endorsing 15-30 minutes with Ambien to initiate sleep. \par \par ENT: Hasn't f/u with ENT, or repeated CT sinus. \par - Sinus rinses and flonase PRN \par \par Bone Density 11/28/16' normal T score-1.3\par - repeat in 2021 rx given \par \par Urology/Conception: no Vas Deferens Pt has postponed his sperm extraction until next spring (2021) pt prefers to be more optimized and will have large deductible in the new year. Pt aware he will have to be off modulator for minimum of 3 months prior to procedure. Not actively trying to conceive still using condoms and protection. Wife had genetic testing done. \par - f/u Dr Josue urology discuss potential risk to spermatogenesis with different iv and po antibiotics\par \par Health Maintenance:\par Flu vaccine 10/2020 left im delt\par Pneumococcal 23 to be administered in 2020\par BD up to date repeat in 5 years\par CXR 2020 \par \par CARDIO Echo wnl mild mitral regurg\par \par discussed taking necessary precautions when at work and out of the house during Covid19 pandemic, recommended N95 mask and social distancing, good hand hygiene. avoid crowds. \par \par Pt was also consented to SERA trials today for blood draw and sputum collection. Discussed all risks and benefits and that participation was voluntary. Pt agrees he would like to participate in the trial today. \par \par f/u in 3 months

## 2021-01-08 NOTE — HISTORY OF PRESENT ILLNESS
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 [Pseudomonas] : Pseudomonas [Other: ___] : [unfilled] [___] : the last positive Pseudomonas result was [unfilled] [Last AFB Date: ___] : the AFB was performed  [unfilled] [None] : ~He/She~ has no hemoptysis [FVC ___] : the FVC was [unfilled] liters [FEV1: ___] : the FEV1 was [unfilled] liters [] : vest daily [Other ___] : exercise [unfilled] [Fair] : fair [Pancreatic Insufficiency] : pancreatic insufficiency [Pancreatic Enzyme Supp.] : uses pancreatic enzyme supplements [CFRD] : CFRD [Date: ___] : The last HgA1C was performed on [unfilled] [HgA1C Value: ___] : HgA1C value was [unfilled]  [AFB] : the patient had a MAC negative AFB [Headache] : no headache [Congestion] : no nasal congestion [Sinus Pain] : no sinus pain [Pancreatitis] : no pancreatitis [Diarrhea] : no diarrhea [Constipation] : no constipation [Steatorrhea] : no steatorrhea [de-identified] : 31 y/o male CF pt who was dx'ed CF at 6 mo old, + sweat, + 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 Pt also has chronic sinusitis and PI.\par \par Pt is here today for research visit Week #60 [id number] and clinic visit. Pt had discontinued study drug on 9/21/2020-10/7/20. And resumed on 10/8/20. Patient was planning for sperm extraction now delayed until Spring of 2021. Pt is here today feeling well and anticipating this procedure in the coming months. Reported increased cough and mucous production for 3 days 2 weeks again which self resolved. + runny nose two weeks with mask wearing. Working long hours. Reports his cough and mucous production are at baseline. No sob, wheeze, crackles, n/v/d/c. Self stopped advair feels no difference without it. Has not been managing Diabetes and has not used insulin or checked his BS in months. \par \par Subject completed [id number] on 11/13/19. \par Research [id number]. Pt reports no adverse events or side effects from current study drug. Subject had lapse in treatment and was off of study drug from 9/21/2020-10/7/2020. Resumed triple therapy on 10/8/20.\par Influenza A 1/2020\par \par PULM: ACT alb/HS/pulm QD with Vest/Aerobika. Doing less often due to heavy work schedule. \par \par ENT: No recent recurrence of thrush. No Tinnitus, sore throat, no acute sinus sxs. Runny nose with mask wearing\par \par ENDO: Bone Density 11/28/16' normal T score-1.3, CFRD supposed to be on insulin pending omnipod has not used insulin or check FSBGs in months. Dr Salazar. Denies polys\par \par Urology/Conception: no Vas Deferens Pt has postponed his sperm extraction untilspring (2021) pt prefers to be more optimized and will have large deductible in the new year. Pt aware he will have to be off modulator for minimum of 3 months prior to procedure. Not actively trying to conceive still using condoms and protection. Wife had genetic testing done. \par \par Nutrition: Using Creon as ordered, no issue.  No GI complaints today. 1-2 BMs daily. Good appetite weight has been stable\par \par Sleep-DIS using Ambien 5mg 2x/week and benefitting. Endorsing 15-30 minutes with Ambien to initiate sleep. \par \par Left eye pain with blurry vision resolved - pt has been advised multiple times to schedule f/u with opthalmology \par \par Social:  2019, not eligible for [place] due to income. Maddy, [employer].

## 2021-01-15 ENCOUNTER — NON-APPOINTMENT (OUTPATIENT)
Age: 31
End: 2021-01-15

## 2021-01-25 ENCOUNTER — TRANSCRIPTION ENCOUNTER (OUTPATIENT)
Age: 31
End: 2021-01-25

## 2021-02-04 ENCOUNTER — NON-APPOINTMENT (OUTPATIENT)
Age: 31
End: 2021-02-04

## 2021-02-09 ENCOUNTER — NON-APPOINTMENT (OUTPATIENT)
Age: 31
End: 2021-02-09

## 2021-02-16 ENCOUNTER — APPOINTMENT (OUTPATIENT)
Dept: PULMONOLOGY | Facility: CLINIC | Age: 31
End: 2021-02-16
Payer: COMMERCIAL

## 2021-02-16 ENCOUNTER — NON-APPOINTMENT (OUTPATIENT)
Age: 31
End: 2021-02-16

## 2021-02-16 ENCOUNTER — APPOINTMENT (OUTPATIENT)
Dept: ENDOCRINOLOGY | Facility: CLINIC | Age: 31
End: 2021-02-16
Payer: COMMERCIAL

## 2021-02-16 VITALS
WEIGHT: 134 LBS | HEIGHT: 65 IN | SYSTOLIC BLOOD PRESSURE: 120 MMHG | BODY MASS INDEX: 22.33 KG/M2 | TEMPERATURE: 97.2 F | DIASTOLIC BLOOD PRESSURE: 80 MMHG

## 2021-02-16 VITALS
TEMPERATURE: 97.7 F | SYSTOLIC BLOOD PRESSURE: 131 MMHG | HEART RATE: 78 BPM | BODY MASS INDEX: 22.16 KG/M2 | WEIGHT: 133 LBS | OXYGEN SATURATION: 97 % | RESPIRATION RATE: 16 BRPM | HEIGHT: 65 IN | DIASTOLIC BLOOD PRESSURE: 73 MMHG

## 2021-02-16 DIAGNOSIS — Z51.81 ENCOUNTER FOR THERAPEUTIC DRUG LVL MONITORING: ICD-10-CM

## 2021-02-16 LAB
GLUCOSE BLDC GLUCOMTR-MCNC: 102
HBA1C MFR BLD HPLC: 6.7

## 2021-02-16 PROCEDURE — 99072 ADDL SUPL MATRL&STAF TM PHE: CPT

## 2021-02-16 PROCEDURE — 99215 OFFICE O/P EST HI 40 MIN: CPT

## 2021-02-16 PROCEDURE — 99214 OFFICE O/P EST MOD 30 MIN: CPT

## 2021-02-16 NOTE — HISTORY OF PRESENT ILLNESS
[FreeTextEntry1] : Patient is a 29-year-old male with history of cystic fibrosis diagnosed at age 6 months, with positive sweat, positive gene for delta 508 and 3905insT. patient also with a history of chronic sinusitis.\par \par Patient was followed by Dr. coello male, was last seen in the office in September 2017.  Establish care with me in March 2019.  Had not been following up for a while.\par \par Patient reported that he was diagnosed with elevated glucose since age 17.  He was treated with mealtime insulin only.  Currently he is only taking NovoLog 2 units with meal.  He reported that he had not been taking the insulin for a long time.  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  He has been out and about most of the day.  Therefore often forgetting to bring his insulin NovoLog pen with him.  He is interested to find out if there is an insulin pump that is available for him which will make his adherence to insulin therapy better.  He is also under research protocol for CF medication Trikafta.  \par \par He utilizes a freestyle yonathan sensor.\par \par Regarding bones density, was diagnosed with osteopenia with a T score of -1.3, bone density was last done in November 26, 2016. Patient is using Creon as ordered.  No GI issues.\par \par Going off Trikala, trying to conceive with his wife.  \par \par \par  [Continuous Glucose Monitoring] : Continuous Glucose Monitoring: Yes [Joshua] : Joshua [FreeTextEntry2] : 57 [FreeTextEntry4] : 0 [FreeTextEntry3] : 33

## 2021-02-16 NOTE — ASSESSMENT
[FreeTextEntry1] : Mr. SOCORRO STEPHEN is 28 year old male here for evaluation of CFRD, on meal time insulin only \par \par #1 CFRD\par A1C 6.7% which is at goal. \par Patient is currently utilizing the freestyle sensor.  Which he really likes.  However he has not been very adherent with his mealtime insulin dose, NovoLog 2 units with meals, because he has been working outside mostly.  Sometimes forget to bring his insulin pen with him.  He is interested to find out about Omni pod system.  I think he will benefits from this as it can do 0 basal units on the system.  He really just needs mealtime coverage.  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  Recommend patient to follow-up with ophthalmologist.  Referral for ophthalmology was given for patient today.  Once patient obtained Omni pod system, he was set up an appointment with our certified diabetes educator for training.  We will set for basal of 0.0 units, (or lowest dosage every other hour)  manual bolus of 2 units with meals.  Will set up a correction factor of 1: 100 above 200 mg/dL.  Active insulin time 4 hours.  Glucose target 150\par We will look into Omnipod for this patient.  \par He hasn't been to eye doctor. He has to renew license with DMV soon. \par \par \par #2 Vitamin D Deficiency\par Vitamin D in June 2019 was 27.9.  Recommend to bring the level above 30 ng/mm for bone health.  Continue with supplementations.\par Most recent level was Aug 2020, level was 33.8 ng/mL. \par

## 2021-02-17 ENCOUNTER — NON-APPOINTMENT (OUTPATIENT)
Age: 31
End: 2021-02-17

## 2021-02-17 PROBLEM — Z51.81 THERAPEUTIC DRUG MONITORING: Status: ACTIVE | Noted: 2021-02-17

## 2021-02-17 NOTE — END OF VISIT
[Time Spent: ___ minutes] : I have spent [unfilled] minutes of time on the encounter. [FreeTextEntry3] : I agree with the advanced clinical provider's history, physical examination and plan of care. I personally elicited a history and examined the patient. See above attestation.\par \par Still trying to appeal to insurance to obtain coverage for inhaled vancomycin. In the meantime, will order [place] for GN coverage, start as soon as he gest it. He is agreeable to postponing sperm extraction procedure by 1 month to optimize his respiratory status. Other measures could be to do as needed PO antibiotics if he developed Pex while off trikafta. He did not finish a PO antibiotic course last Pex and went back to trikafta. Pt wishes to hold trikafta prior to procedure. D/w wife on phone as well. Again, reinforced importance of DM control, checking FS; resume Advair, resume ACT BID to TID..

## 2021-02-17 NOTE — ASSESSMENT
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 Pt also has chronic sinusitis and PI. Dx'ed CFRD recently, pt last saw Endo March 2019 and started Novolog AC 2units.\par \par Subject completed research visit Week #60 [id number] and clinic visit. Pt had discontinued study drug on 9/21/2020-10/7/20. And resumed on 10/8/20. Patient was planning for sperm extraction Spring of 2021. Here today for first dose of nebulized Vancomycin pt tolerated well and was observed in office for 1 hour post administration without adverse events. Pt was educated on Epi pen use verbalized understanding\par \par Subject completed [id number] on 11/13/19.\par Research enrolled in [id number] - Pt would like to continue to participate in the study at this time and feels greater benefit while being on study drug\par - Pt Will notify study team and sponsor when planning to stop study drug 90 days prior to procedure\par - Would like to continue participating in study \par - discussed risk and benefits for patient and potential fetus of being on modulator during sperm extraction and advised to hold triple therapy 90 days before\par \par ACT: albuterol > HS 3% >Pulmozyme, Using Aerobika less than daily but more amenable to using it compared to his Vest Says he does not have time for it. Active at work walking up and down stairs.\par - Fev1 84% (1/8/20), 78% (10/16/20),  81% (7/21/20), 79% (1/13/20), 81% (12/11/19), 81% (11/26/19), 81% (11/13/19), 84% (6/2019)\par - REINFORCED TODAY the importance of pt performing ACT daily and up to TID \par - Cont Advair \par \par ID: Chronic MRSA and Burkholderia multivor. \par - Discussed targeting MRSA as likely causative agent for exacerbations pt amenable to nebulized antibiotics MRSA greater quantity on recent cultures. Burkholderia (S) to [place] can consider if needed. \par Tolerated Bethkis and tobramycin in past, did not tolerate TOBIpod, [place] was also tolerated. \par - Tolerated inhaled Vancomycin (pt says itchy throat was mild) with post administration observation \par - Advised annual audiometry testing \par - Start [place] pending appeal pt has to call insurance and give permission for PA to repesent him for appeal \par - Cont inhaled Vancomycin \par \par CXR:10/10/20 Reticular opacities and bronchiectasis slight improvement in L mid to lower lung periphery but other wise no significant internal change \par \par CFRD: Last A1C 6.4. Poor compliance/adherance with Freestyle yonathan and consistent insulin NOT using 2 Units novolog with lunch and dinner. Pending Omnipod insulin pump but high out of pocket cost. \par - must f/u with Dr. Salazar, discussed importance of managing CFRD and potential complications, discussed other CGM options and possible insulin pump therapy to minimize his poor compliance with insulin and checking FSBG\par - discussed in dept with pt about long term complications of uncontrolled CFRD. \par - Has f/u with Dr Salazar today\par \par Nutrition: Using Creon as ordered 5-6 with meals, 3-4 with snacks, no issue.  No GI complaints today\par \par Sleep-DIS using Ambien 5mg 2x/week and benefitting. Endorsing 15-30 minutes with Ambien to initiate sleep. \par \par ENT: Hasn't f/u with ENT, or repeated CT sinus. Sinus rinses and flonase PRN \par \par Bone Density 11/28/16' normal T score-1.3\par - repeat in 2021 has RX \par \par Urology/Conception: no Vas Deferens Pt has postponed his sperm extraction until next spring (2021) pt prefers to  Pt aware he will have to be off modulator for minimum of 3 months prior to procedure. Not actively trying to conceive still using condoms and protection. Wife had genetic testing done. \par - f/u Dr Josue urology discuss potential risk to spermatogenesis with different iv and po antibiotics\par - pt planning on Spring for extraction \par \par Health Maintenance:\par Flu vaccine 10/2020 left im delt\par Pneumococcal 23 to be administered in 2020\par BD up to date repeat in 5 years\par CXR 2020 \par \par CARDIO Echo wnl mild mitral regurg\par \par discussed taking necessary precautions when at work and out of the house during Covid19 pandemic, recommended N95 mask and social distancing, good hand hygiene. avoid crowds. \par \par Pt participated in Sera trials \par \par f/u in 1 month

## 2021-02-17 NOTE — PHYSICAL EXAM
[General Appearance - Well Developed] : well developed [Normal Appearance] : normal appearance [Well Groomed] : well groomed [General Appearance - In No Acute Distress] : no acute distress [Normal Conjunctiva] : the conjunctiva exhibited no abnormalities [Normal Oral Mucosa] : normal oral mucosa [No Oral Pallor] : no oral pallor [Normal Oropharynx] : normal oropharynx [Neck Appearance] : the appearance of the neck was normal [Neck Cervical Mass (___cm)] : no neck mass was observed [Heart Rate And Rhythm] : heart rate was normal and rhythm regular [Heart Sounds] : normal S1 and S2 [Heart Sounds Gallop] : no gallops [Murmurs] : no murmurs [Heart Sounds Pericardial Friction Rub] : no pericardial rub [Exaggerated Use Of Accessory Muscles For Inspiration] : no accessory muscle use [Respiration, Rhythm And Depth] : normal respiratory rhythm and effort [Bowel Sounds] : normal bowel sounds [Abdomen Soft] : soft [Abdomen Tenderness] : non-tender [Abdomen Mass (___ Cm)] : no abdominal mass palpated [Abnormal Walk] : normal gait [Musculoskeletal - Swelling] : no joint swelling seen [Cyanosis, Localized] : no localized cyanosis [Skin Color & Pigmentation] : normal skin color and pigmentation [] : no rash [No Focal Deficits] : no focal deficits [Oriented To Time, Place, And Person] : oriented to person, place, and time [Impaired Insight] : insight and judgment were intact [Affect] : the affect was normal [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

## 2021-02-17 NOTE — HISTORY OF PRESENT ILLNESS
[Doing Well] : doing well [0  -  Nothing at all] : 0, nothing at all [MSSA] : MSSA [B. Cepacia] : Burholderia Cepacia [MRSA] : MRSA [Pseudomonas] : Pseudomonas [Other: ___] : [unfilled] [___] : the last positive Pseudomonas result was [unfilled] [Last AFB Date: ___] : the AFB was performed  [unfilled] [None] : ~He/She~ has no hemoptysis [FVC ___] : the FVC was [unfilled] liters [FEV1: ___] : the FEV1 was [unfilled] liters [] : vest daily [Other ___] : exercise [unfilled] [Fair] : fair [Pancreatic Insufficiency] : pancreatic insufficiency [Pancreatic Enzyme Supp.] : uses pancreatic enzyme supplements [CFRD] : CFRD [Date: ___] : The last HgA1C was performed on [unfilled] [HgA1C Value: ___] : HgA1C value was [unfilled]  [AFB] : the patient had a MAC negative AFB [Headache] : no headache [Congestion] : no nasal congestion [Sinus Pain] : no sinus pain [Pancreatitis] : no pancreatitis [Diarrhea] : no diarrhea [Constipation] : no constipation [Steatorrhea] : no steatorrhea [de-identified] : 31 y/o male CF pt who was dx'ed CF at 6 mo old, + sweat, + 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 Pt also has chronic sinusitis and PI.\par \par Subject completed Week #60 [id number].\par Patient seen today for first dose of inhaled vancomycin. Pt had reported history of rash and itchiness with inhaled and IV vancomycin. Pt performed albuterol nebulizer and inhaled Vancomycin and was observed in office. Pt here today also reports 4 days of increased mucous production green and thicker. Planning for sperm extraction in Spring of 2021. No sob, wheeze, crackles, n/v/d/c, rash, swelling, sore Back on Advair. Has endo apt today.\par \par Subject completed [id number] on 11/13/19. \par Research [id number]. Pt reports no adverse events or side effects from current study drug. Subject had lapse in treatment and was off of study drug from 9/21/2020-10/7/2020. Resumed triple therapy on 10/8/20.\par \par PULM: ACT alb/HS/pulm QD with Vest/Aerobika. Increased frequency trying to find time for it. \par \par ENT: No recent recurrence of thrush. No Tinnitus, sore throat, no acute sinus sxs.\par \par ENDO: Bone Density 11/28/16' normal T score-1.3, CFRD supposed to be on insulin pending omnipod has not used insulin or check FSBGs in months. Dr Salazar. Denies polys. One episode of hypoglycemia last week 59 after insulin dosing for dinner he was very active that day had glucose tabs and it resolved. No LOC. \par \par Urology/Conception: no Vas Deferens Pt has postponed his sperm extraction untilspring (2021) pt prefers to be more optimized. Pt aware he will have to be off modulator for minimum of 3 months prior to procedure. Not actively trying to conceive still using condoms and protection. Wife had genetic testing done. \par \par Nutrition: Using Creon as ordered, no issue.  No GI complaints today. 1-2 BMs daily. Good appetite weight has been stable\par \par Sleep-DIS using Ambien 5mg 2x/week and benefitting. Endorsing 15-30 minutes with Ambien to initiate sleep. \par \par Left eye pain with blurry vision resolved - pt has been advised multiple times to schedule f/u with opthalmology \par \par Social:  2019, not eligible for [place] due to income. vilma Cast[employer]eda.

## 2021-02-22 ENCOUNTER — NON-APPOINTMENT (OUTPATIENT)
Age: 31
End: 2021-02-22

## 2021-02-28 ENCOUNTER — TRANSCRIPTION ENCOUNTER (OUTPATIENT)
Age: 31
End: 2021-02-28

## 2021-03-01 ENCOUNTER — NON-APPOINTMENT (OUTPATIENT)
Age: 31
End: 2021-03-01

## 2021-03-06 NOTE — PATIENT PROFILE ADULT. - HEALTH/HEALTHCARE ANXIETIES, PROFILE
none Opioid Counseling: I discussed with the patient the potential side effects of opioids including but not limited to addiction, altered mental status, and depression. I stressed avoiding alcohol, benzodiazepines, muscle relaxants and sleep aids unless specifically okayed by a physician. The patient verbalized understanding of the proper use and possible adverse effects of opioids. All of the patient's questions and concerns were addressed. They were instructed to flush the remaining pills down the toilet if they did not need them for pain.

## 2021-03-08 ENCOUNTER — NON-APPOINTMENT (OUTPATIENT)
Age: 31
End: 2021-03-08

## 2021-03-10 ENCOUNTER — TRANSCRIPTION ENCOUNTER (OUTPATIENT)
Age: 31
End: 2021-03-10

## 2021-03-16 ENCOUNTER — APPOINTMENT (OUTPATIENT)
Dept: ENDOCRINOLOGY | Facility: CLINIC | Age: 31
End: 2021-03-16

## 2021-03-26 ENCOUNTER — NON-APPOINTMENT (OUTPATIENT)
Age: 31
End: 2021-03-26

## 2021-03-27 ENCOUNTER — APPOINTMENT (OUTPATIENT)
Dept: DISASTER EMERGENCY | Facility: CLINIC | Age: 31
End: 2021-03-27

## 2021-03-28 LAB — SARS-COV-2 N GENE NPH QL NAA+PROBE: NOT DETECTED

## 2021-03-29 ENCOUNTER — NON-APPOINTMENT (OUTPATIENT)
Age: 31
End: 2021-03-29

## 2021-03-30 ENCOUNTER — APPOINTMENT (OUTPATIENT)
Dept: PULMONOLOGY | Facility: CLINIC | Age: 31
End: 2021-03-30
Payer: SUBSIDIZED

## 2021-03-30 VITALS
SYSTOLIC BLOOD PRESSURE: 125 MMHG | DIASTOLIC BLOOD PRESSURE: 68 MMHG | HEIGHT: 65 IN | WEIGHT: 136 LBS | TEMPERATURE: 98.4 F | RESPIRATION RATE: 16 BRPM | OXYGEN SATURATION: 99 % | BODY MASS INDEX: 22.66 KG/M2 | HEART RATE: 68 BPM

## 2021-03-30 PROCEDURE — 99215 OFFICE O/P EST HI 40 MIN: CPT

## 2021-03-30 PROCEDURE — 99072 ADDL SUPL MATRL&STAF TM PHE: CPT

## 2021-03-30 RX ORDER — NYSTATIN 100000 [USP'U]/ML
100000 SUSPENSION ORAL 3 TIMES DAILY
Qty: 150 | Refills: 2 | Status: DISCONTINUED | COMMUNITY
Start: 2020-10-05 | End: 2021-03-30

## 2021-03-30 RX ORDER — CETIRIZINE HYDROCHLORIDE 10 MG/1
10 TABLET, FILM COATED ORAL DAILY
Qty: 30 | Refills: 0 | Status: DISCONTINUED | COMMUNITY
Start: 2018-11-02 | End: 2021-03-30

## 2021-03-30 RX ORDER — PHYTONADIONE 5 MG/1
5 TABLET ORAL DAILY
Qty: 30 | Refills: 0 | Status: DISCONTINUED | COMMUNITY
Start: 2020-10-05 | End: 2021-03-30

## 2021-03-30 RX ORDER — SULFAMETHOXAZOLE AND TRIMETHOPRIM 800; 160 MG/1; MG/1
800-160 TABLET ORAL
Qty: 27 | Refills: 0 | Status: DISCONTINUED | COMMUNITY
Start: 2020-10-05 | End: 2021-03-30

## 2021-03-30 NOTE — HISTORY OF PRESENT ILLNESS
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 Pt also has chronic sinusitis and PI.\par \par Subject completed [id number].\par Patient seen today for clinic and research visit. Pt has been off of study drug for several weeks, last dose was 2/22/21. Pt was positive for influenza earlier this month, had 3 negative covid swabs. Finished 2 week course of Bactrim. Has increased cough, mucous production, crackles from baseline on study drug. Lower energy levels. Otherwise feeling well today, has good exercise tolerance. Tolerated 2 weeks of inhaled Vanco will be starting [facility] today.  No sob, wheeze, hemoptysis, n/v/d/c, rash, swelling, sore throat, eye pain, ear pain, thrush. \par \par Subject completed [id number] on 11/13/19. \par Research [id number]. Pt reports no adverse events or side effects from current study drug. Subject had lapse in treatment and was off of study drug from 9/21/2020-10/7/2020. Resumed triple therapy on 10/8/20. Stopped on 2/23/21 pending sperm extraction. \par \par PULM: ACT alb/HS/pulm <QD with Vest/Aerobika. 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 \par \par ENT: No recent recurrence of thrush. No Tinnitus, sore throat, no acute sinus sxs.\par \par ENDO: Bone Density 11/28/16' normal T score-1.3, CFRD supposed to be on insulin pending omnipod still. Saw Dr. Salazar FSBGs avg 150s, 2 hypos in last month <60 resolves with glucose. On Novolog 1-2Units with meals. \par \par Urology/Conception: no Vas Deferens Pt has postponed his sperm extraction after 5/24/21 Pt has been off modulator since 2/23/21 and will be for minimum of 3 months prior to procedure. Not actively trying to conceive still using condoms and protection. Wife had genetic testing done. \par \par Nutrition: Using Creon as ordered, no issue.  No GI complaints today. 1-2 BMs daily. Good appetite weight has been stable\par \par Sleep-DIS using Ambien 5mg < weekly. Endorsing 15-30 minutes with Ambien to initiate sleep. \par \par Cardio: biphasic P waves on EKG, wnl ECHO 2/2020 mild mitral regurg\par \par Social:  2019, not eligible for [employer] due to income. [employer], [employer] Program.

## 2021-03-30 NOTE — ASSESSMENT
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 Pt also has chronic sinusitis and PI. 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 \par \par Subject here today for clinic and research visit  [id number]. Pt had discontinued study drug on 9/21/2020-10/7/20. And resumed on 10/8/20. Now off again since 2/23/21 pending sperm extraction around 5/24/21. Tolerating inhaled Vancomycin\par \par Subject completed [id number] on 11/13/19.\par Research enrolled in [id number] - Pt would like to continue to participate in the study at this time and feels greater benefit while being on study drug. Interested in  and substudy for Sweat Chloride home testing. Recently completed 2 weeks course of Bactrim PO. \par - discussed risk and benefits for patient and potential fetus of being on modulator during sperm extraction and advised to hold triple therapy 90 days before\par \par ACT: albuterol > HS 3% >Pulmozyme, Using Aerobika less than daily\par - Fev1 78% (3/30/21 off study drug), 84% (1/8/20), 78% (10/16/20),  81% (7/21/20), 79% (1/13/20), 81% (12/11/19), 81% (11/26/19), 81% (11/13/19), 84% (6/2019)\par - REINFORCED AGAIN TODAY the importance of pt performing ACT daily and up to TID \par - Cont Advair \par - Amenable to Mannitol tolerance test. \par \par ID: Chronic MRSA and Burkholderia multivor. \par - Discussed targeting MRSA as likely causative agent for exacerbations pt amenable to nebulized antibiotics MRSA greater quantity on recent cultures. Burkholderia (S) to [place] can consider if needed. \par Tolerated Bethkis and tobramycin in past, did not tolerate TOBIpod, [place] was also tolerated. \par - Tolerated inhaled Vancomycin (pt says itchy throat was mild) no side effects during recent administration \par - Cont [place] and Vanco inh QOM \par - Advised annual audiometry testing \par \par CXR:10/10/20 Reticular opacities and bronchiectasis slight improvement in L mid to lower lung periphery but other wise no significant internal change \par \par CFRD: Poor compliance/adherance with Freestyle yonathan and consistent insulin. On 1-2 Units novolog with meals. Pending Omnipod insulin pump but high out of pocket cost. \par - f/u with Dr Salazar as recommended \par - Cont insulin regimen and monitoring FSBGs \par - Discussed health risks for poorly controlled DM again\par \par Nutrition: Using Creon as ordered 5-6 with meals, 3-4 with snacks, no issue.  No GI complaints today\par \par Sleep-DIS using Ambien 5mg 2x/week and benefitting. Endorsing 15-30 minutes with Ambien to initiate sleep. \par \par ENT: Hasn't f/u with ENT, or repeated CT sinus. Sinus rinses and Flonase PRN \par \par Bone Density 11/28/16' normal T score-1.3\par - repeat in 2021 given RX AGAIN \par \par Urology/Conception: no Vas Deferens. Pt aware he will have to be off modulator for minimum of 3 months prior to procedure. Not actively trying to conceive still using condoms and protection. Wife had genetic testing done. \par - schedule Sperm extraction for after 5/24/21 \par - Discussed importance of continuing to use barrier protection even once wife is pregnant. \par - Recommended pt come in for pulmonary clearance prior to procedure if anaesthesia other than local is being administered. \par \par Health Maintenance:\par Annual BW due next OV\par Flu vaccine 10/2020 left im delt\par Pneumococcal 23 to be administered in 2020\par DEXA due 2021 has rx\par CXR 10/ 2020 \par Agreeable to COVID19 vaccine after his procedure \par \par CARDIO Echo wnl mild mitral regurg\par \par discussed taking necessary precautions when at work and out of the house during Covid19 pandemic, recommended N95 mask and social distancing, good hand hygiene. avoid crowds. \par \par Pt participated in Sera trials \par \par f/u as scheduled for research 6/22 sooner if needs clearance

## 2021-03-30 NOTE — END OF VISIT
[FreeTextEntry3] : I agree with the advanced clinical provider's history, physical examination and plan of care. I personally elicited a history and examined the patient. See above attestation. Strongly reinforced with pt about increased ACT to at least daily. He is interested in mannitol, will need to schedule a challenge test.\par 45 minutes time spent for patient education related to comorbidities and medications, medical records/labs/radiology reviews, preventative care, documentation.\par \par \par Still trying to appeal to insurance to obtain coverage for inhaled vancomycin. In the meantime, will order cayston for GN coverage, start as soon as he gest it. He is agreeable to postponing sperm extraction procedure by 1 month to optimize his respiratory status. Other measures could be to do as needed PO antibiotics if he developed Pex while off trikafta. He did not finish a PO antibiotic course last Pex and went back to trikafta. Pt wishes to hold trikafta prior to procedure. D/w wife on phone as well. Again, reinforced importance of DM control, checking FS; resume Advair, resume ACT BID to TID..

## 2021-03-31 LAB
COVID-19 SPIKE DOMAIN ANTIBODY INTERPRETATION: NEGATIVE
SARS-COV-2 AB SERPL IA-ACNC: 0.4 U/ML

## 2021-04-05 ENCOUNTER — NON-APPOINTMENT (OUTPATIENT)
Age: 31
End: 2021-04-05

## 2021-04-05 PROBLEM — Z00.00 ENCOUNTER FOR PREVENTIVE HEALTH EXAMINATION: Noted: 2021-04-05

## 2021-04-05 LAB — BACTERIA SPT CF RESP CULT: ABNORMAL

## 2021-04-19 ENCOUNTER — APPOINTMENT (OUTPATIENT)
Dept: UROLOGY | Facility: CLINIC | Age: 31
End: 2021-04-19
Payer: COMMERCIAL

## 2021-04-19 PROCEDURE — 99214 OFFICE O/P EST MOD 30 MIN: CPT | Mod: 95

## 2021-04-19 NOTE — ASSESSMENT
[FreeTextEntry1] : The patient returns for a telehealth appointment to review his recent blood studies and to discuss options for therapy.  He would like to proceed with surgery for bilateral testis biopsy for diagnosis, sperm retrieval and cryopreservation at the end of May..  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 .  Blood tests were essentially normal except for hemoglobin A1c which is elevated at 6.7.  He is under treatment for his diabetes.  He had come off his medications in anticipation of surgery however became ill and needed to be treated.  He will need medical evaluation prior to surgery.  He will also need to consider a local or regional anesthesia rather than a general anesthesia.\par \par I have placed the booking form and will have him get in touch with my .\par \par Telehealth Consultation: 30 minutes  10 minutes reviewing his history and discussing prior results.  20 minutes discussing various treatment options, writing his note. There was also additional time in preparing for the visit and assisting the patient with technology issues he was having with the telehealth platform.

## 2021-04-19 NOTE — HISTORY OF PRESENT ILLNESS
[FreeTextEntry1] : The patient-doctor. relationship has been established in a face-to-face fashion on real-time video audio HIPAA compliant communication using telemedicine software.  The patient's identity has been confirmed.  The patient was previously emailed a copy of the telemedicine consent.  The patient has had a chance to review and has now given verbal consent and has requested care to be assessed and treated through telemedicine. The patient understands there may be limitations in this process and that they need not need further follow-up care in the office and/or hospital settings. We were unable to use the [employer] platform and an alternative platform was utilized.\par \par Verbal consent was given on Wednesday, September 16, 2020 at 1:10 PM by the patient.  It was requested by the physician.  A written consent was previously sent for the patient to sign and return.\par \par The patient returns for a telehealth appointment to review his recent blood studies and to discuss options for therapy.  He would like to proceed with surgery for bilateral testis biopsy for diagnosis, sperm retrieval and cryopreservation at the end of May..  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 \par \par HPI: Patient presented with the chief complaint of history of cystic fibrosis and questioned about his fertility potential for evaluation. His girlfriend has not had any prior pregnancies. He reports that they have not been trying to have children, but have had unprotected intercourse had no pregnancies. .  His past medical history demonstrates a history of cystic fibrosis mild diabetes and asthma (see patient questionnaire).  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  He does not smoke and drinks only socially.  He has an allergy to fentanyl.  His review of systems is non-contributory. His family history is not significant.\par \par The patient reports that he has heard at cystic fibrosis can be associated with reproductive problems and would like to be evaluated. the patient is currently on an ambulatory intravenous antibiotic regimen for a recent pulmonary infection he reports he gets these types of infections approximately 4 times a year\par \par  [Home] : at home, [unfilled] , at the time of the visit. [Medical Office: ([facility])___] : at the medical office located in  [Verbal consent obtained from patient] : the patient, [unfilled]

## 2021-04-22 ENCOUNTER — NON-APPOINTMENT (OUTPATIENT)
Age: 31
End: 2021-04-22

## 2021-04-30 RX ORDER — LANCETS 28 GAUGE
EACH MISCELLANEOUS
Qty: 4 | Refills: 3 | Status: ACTIVE | COMMUNITY
Start: 2021-01-09 | End: 1900-01-01

## 2021-05-03 ENCOUNTER — RX RENEWAL (OUTPATIENT)
Age: 31
End: 2021-05-03

## 2021-05-10 ENCOUNTER — APPOINTMENT (OUTPATIENT)
Dept: UROLOGY | Facility: CLINIC | Age: 31
End: 2021-05-10
Payer: COMMERCIAL

## 2021-05-10 PROCEDURE — 99214 OFFICE O/P EST MOD 30 MIN: CPT | Mod: 95

## 2021-05-10 NOTE — ASSESSMENT
[FreeTextEntry1] : The patient returns for a telehealth appointment he would like to proceed with a bilateral testis biopsy for diagnosis, sperm retrieval and cryopreservation.  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  He is on an investigational medication and is off of it awaiting his surgery.  He is hoping to schedule procedure the first or second week of June.\par \par We again discussed the proposed procedure with the relative risks and benefits including the possibility that sperm may not be retrieved in any sperm retrieved might not be successfully used in an IVF procedure. Recent blood tests were essentially normal except for hemoglobin A1c which is elevated at 6.7.  He is under treatment for his diabetes.  He had come off his medications in anticipation of surgery however became ill and needed to be treated.  He will need medical evaluation prior to surgery.  He will also need to consider a local or regional anesthesia rather than a general anesthesia.\par \par I have spoken with my  and we are looking for time for the first or second week of June.  My  will be in touch with him each week to keep him up-to-date on the progress.\par \par Telehealth Consultation: 30 minutes  10 minutes reviewing his history and discussing prior results.  20 minutes discussing various treatment options, writing his note. There was also additional time in preparing for the visit and assisting the patient with technology issues he was having with the telehealth platform.

## 2021-05-10 NOTE — HISTORY OF PRESENT ILLNESS
[Home] : at home, [unfilled] , at the time of the visit. [Medical Office: ([facility])___] : at the medical office located in  [Verbal consent obtained from patient] : the patient, [unfilled] [FreeTextEntry1] : The patient-doctor. relationship has been established in a face-to-face fashion on real-time video audio HIPAA compliant communication using telemedicine software.  The patient's identity has been confirmed.  The patient was previously emailed a copy of the telemedicine consent.  The patient has had a chance to review and has now given verbal consent and has requested care to be assessed and treated through telemedicine. The patient understands there may be limitations in this process and that they need not need further follow-up care in the office and/or hospital settings. We were unable to use the [employer] platform and an alternative platform was utilized.\par \par Verbal consent was given on Wednesday, September 16, 2020 at 1:10 PM by the patient.  It was requested by the physician.  A written consent was previously sent for the patient to sign and return.\par \par The patient returns for a telehealth appointment he would like to proceed with a bilateral testis biopsy for diagnosis, sperm retrieval and cryopreservation.  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  He is on an investigational medication and is off of it awaiting his surgery.  He is hoping to schedule procedure the first or second week of June.\par \par HPI: Patient presented with the chief complaint of history of cystic fibrosis and questioned about his fertility potential for evaluation. His girlfriend has not had any prior pregnancies. He reports that they have not been trying to have children, but have had unprotected intercourse had no pregnancies. .  His past medical history demonstrates a history of cystic fibrosis mild diabetes and asthma (see patient questionnaire).  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  He does not smoke and drinks only socially.  He has an allergy to fentanyl.  His review of systems is non-contributory. His family history is not significant.\par \par The patient reports that he has heard at cystic fibrosis can be associated with reproductive problems and would like to be evaluated. the patient is currently on an ambulatory intravenous antibiotic regimen for a recent pulmonary infection he reports he gets these types of infections approximately 4 times a year\par \par

## 2021-05-14 ENCOUNTER — NON-APPOINTMENT (OUTPATIENT)
Age: 31
End: 2021-05-14

## 2021-05-19 LAB — RHODAMINE-AURAMINE STN SPEC: NORMAL

## 2021-05-20 ENCOUNTER — APPOINTMENT (OUTPATIENT)
Dept: PULMONOLOGY | Facility: CLINIC | Age: 31
End: 2021-05-20
Payer: COMMERCIAL

## 2021-05-20 VITALS
WEIGHT: 130 LBS | BODY MASS INDEX: 21.66 KG/M2 | HEIGHT: 65 IN | SYSTOLIC BLOOD PRESSURE: 120 MMHG | TEMPERATURE: 97.4 F | HEART RATE: 78 BPM | RESPIRATION RATE: 16 BRPM | OXYGEN SATURATION: 97 % | DIASTOLIC BLOOD PRESSURE: 68 MMHG

## 2021-05-20 DIAGNOSIS — Z86.14 PERSONAL HISTORY OF METHICILLIN RESISTANT STAPHYLOCOCCUS AUREUS INFECTION: ICD-10-CM

## 2021-05-20 DIAGNOSIS — Z91.19 PATIENT'S NONCOMPLIANCE WITH OTHER MEDICAL TREATMENT AND REGIMEN: ICD-10-CM

## 2021-05-20 LAB
ALBUMIN SERPL ELPH-MCNC: 4.3 G/DL
ALP BLD-CCNC: 130 U/L
ALT SERPL-CCNC: 36 U/L
ANION GAP SERPL CALC-SCNC: 13 MMOL/L
AST SERPL-CCNC: 20 U/L
BASOPHILS # BLD AUTO: 0.04 K/UL
BASOPHILS NFR BLD AUTO: 0.5 %
BILIRUB SERPL-MCNC: 0.3 MG/DL
BUN SERPL-MCNC: 13 MG/DL
CALCIUM SERPL-MCNC: 9.2 MG/DL
CHLORIDE SERPL-SCNC: 100 MMOL/L
CO2 SERPL-SCNC: 25 MMOL/L
CREAT SERPL-MCNC: 0.9 MG/DL
EOSINOPHIL # BLD AUTO: 0.13 K/UL
EOSINOPHIL NFR BLD AUTO: 1.6 %
GLUCOSE SERPL-MCNC: 154 MG/DL
HCT VFR BLD CALC: 44.6 %
HGB BLD-MCNC: 15 G/DL
IMM GRANULOCYTES NFR BLD AUTO: 0.2 %
LYMPHOCYTES # BLD AUTO: 1.52 K/UL
LYMPHOCYTES NFR BLD AUTO: 19 %
MAN DIFF?: NORMAL
MCHC RBC-ENTMCNC: 29.1 PG
MCHC RBC-ENTMCNC: 33.6 GM/DL
MCV RBC AUTO: 86.6 FL
MONOCYTES # BLD AUTO: 0.56 K/UL
MONOCYTES NFR BLD AUTO: 7 %
NEUTROPHILS # BLD AUTO: 5.75 K/UL
NEUTROPHILS NFR BLD AUTO: 71.7 %
PLATELET # BLD AUTO: 379 K/UL
POTASSIUM SERPL-SCNC: 5.1 MMOL/L
PROT SERPL-MCNC: 7.9 G/DL
RBC # BLD: 5.15 M/UL
RBC # FLD: 11.8 %
SODIUM SERPL-SCNC: 138 MMOL/L
WBC # FLD AUTO: 8.02 K/UL

## 2021-05-20 PROCEDURE — 99215 OFFICE O/P EST HI 40 MIN: CPT

## 2021-05-20 RX ORDER — MINOCYCLINE HYDROCHLORIDE 100 MG/1
100 TABLET ORAL TWICE DAILY
Qty: 20 | Refills: 0 | Status: DISCONTINUED | COMMUNITY
Start: 2021-04-05 | End: 2021-05-20

## 2021-05-20 NOTE — PHYSICAL EXAM
[General Appearance - Well Developed] : well developed [Normal Appearance] : normal appearance [Well Groomed] : well groomed [General Appearance - In No Acute Distress] : no acute distress [Normal Conjunctiva] : the conjunctiva exhibited no abnormalities [Normal Oral Mucosa] : normal oral mucosa [No Oral Pallor] : no oral pallor [Normal Oropharynx] : normal oropharynx [Neck Appearance] : the appearance of the neck was normal [Neck Cervical Mass (___cm)] : no neck mass was observed [Heart Rate And Rhythm] : heart rate was normal and rhythm regular [Heart Sounds] : normal S1 and S2 [Heart Sounds Gallop] : no gallops [Murmurs] : no murmurs [Heart Sounds Pericardial Friction Rub] : no pericardial rub [Respiration, Rhythm And Depth] : normal respiratory rhythm and effort [Exaggerated Use Of Accessory Muscles For Inspiration] : no accessory muscle use [Bowel Sounds] : normal bowel sounds [Abdomen Soft] : soft [Abdomen Tenderness] : non-tender [Abdomen Mass (___ Cm)] : no abdominal mass palpated [Abnormal Walk] : normal gait [Musculoskeletal - Swelling] : no joint swelling seen [Cyanosis, Localized] : no localized cyanosis [Skin Color & Pigmentation] : normal skin color and pigmentation [] : no rash [No Focal Deficits] : no focal deficits [Oriented To Time, Place, And Person] : oriented to person, place, and time [Impaired Insight] : insight and judgment were intact [Affect] : the affect was normal [FreeTextEntry1] : fingernail clubbing noted

## 2021-05-20 NOTE — END OF VISIT
[FreeTextEntry3] : I agree with the advanced clinical provider's history, physical examination and plan of care. I personally elicited a history and examined the patient. See above attestation. 45 minutes time spent for patient education related to comorbidities and medications, medical records/labs/radiology reviews, preventative care, documentation.\par This is patient's 3rd pulmonary exacerbation since stopping trikafta in anticipation of sperm extraction procedure. Patient recovered FEV1 post 2nd Pex. But baseline clinical status does not last long post-antibiotic course as pt was only recently on PO minocycline about 2 weeks ago. Having diarrhea now on linezolid day 5. check stool for c. diff. if negative, switch PO antibiotic to bactrim TID plus probiotics. D/w pt about IV course within next month if he is not better and to consider time off from work, increasing TST as he only gets 5 hours of sleep but can sleep 10 hours minimum on days off which would help him recover sooner. Losing weight, advised to increase caloric intake. I will reach out to urology to try to move up procedure, as long as it is post 5/24/21 (90 days off Trikafta). \par \par  [Time Spent: ___ minutes] : I have spent [unfilled] minutes of time on the encounter.

## 2021-05-20 NOTE — ASSESSMENT
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 Pt also has chronic sinusitis and PI. 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 \par \par Patient here today acutely ill Day 7 of symptoms and Day 5 of PO Linezolid with watery diarrhea 3-4x a day. Improvement in respiratory symptoms about 50%. \par \par Subject completed [id number] on 11/13/19.\par Pt had discontinued study drug on 9/21/2020-10/7/20. And resumed on 10/8/20. Now off again since 2/23/21 pending sperm extraction around 5/24/21. \par Research enrolled in [id number] - Pt would like to continue to participate in the study at this time and feels greater benefit while being on study drug. Interested in  and substudy for Sweat Chloride home testing.\par - discussed risk and benefits for patient and potential fetus of being on modulator during sperm extraction and advised to hold triple therapy 90 days before\par \par ACT: albuterol > HS 3% >Pulmozyme, Using Aerobika less than daily\par - Fev1 78% (3/30/21 off study drug), 84% (1/8/20), 78% (10/16/20),  81% (7/21/20), 79% (1/13/20), 81% (12/11/19), 81% (11/26/19), 81% (11/13/19), 84% (6/2019)\par - REINFORCED AGAIN TODAY the importance of pt performing ACT daily and up to TID \par - Cont Advair \par - Amenable to Mannitol tolerance test. \par - STOP Linezolid \par - Sputum CS today \par - RVP today \par - CBC with diff, CMP and STOOL Cdiff PCR and Culture \par - Advised pt drop of stool sample to lab asap, pending results if negative can start Bactrim \par \par ID: Chronic MRSA and Burkholderia multivor. \par - Discussed targeting MRSA as likely causative agent for exacerbations pt amenable to nebulized antibiotics MRSA greater quantity on recent cultures. Burkholderia (S) to [place] can consider if needed. \par Tolerated Bethkis and tobramycin in past, did not tolerate TOBIpod, [place] was also tolerated. \par - Tolerated inhaled Vancomycin (pt says itchy throat was mild) no side effects during recent administration \par - Cont [place] and Vanco inh QOM \par - Advised annual audiometry testing \par \par CXR:10/10/20 Reticular opacities and bronchiectasis slight improvement in L mid to lower lung periphery but other wise no significant internal change \par \par CFRD: Poor compliance/adherence with Freestyle yonathan and consistent insulin. On 1-2 Units novolog with meals. Pending Omnipod insulin pump but high out of pocket cost. \par - f/u with Dr Salazar as recommended \par - Cont insulin regimen and monitoring FSBGs \par - Discussed health risks for poorly controlled DM again\par \par Nutrition: Using Creon as ordered 5-6 with meals, 3-4 with snacks, no issue.  No GI complaints today\par \par Sleep-DIS using Ambien 5mg 2x/week and benefitting. Endorsing 15-30 minutes with Ambien to initiate sleep. TST 5-6hours\par - Reinforced improving sleep habits, go to bed earlier\par \par ENT: Hasn't f/u with ENT, or repeated CT sinus. Sinus rinses and Flonase PRN \par \par Bone Density 11/28/16' normal T score-1.3\par - repeat in 2021 given RX AGAIN \par \par Urology/Conception: no Vas Deferens. Pt aware he will have to be off modulator for minimum of 3 months prior to procedure. Not actively trying to conceive still using condoms and protection. Wife had genetic testing done. \par - schedule Sperm extraction for after 5/24/21 \par - Discussed importance of continuing to use barrier protection even once wife is pregnant. \par - Recommended pt come in for pulmonary clearance prior to procedure if anaesthesia other than local is being administered. \par - No date scheduled as of yet, Dr Wesley reached out to Dr Josue\par \par Health Maintenance:\par Annual BW due next OV\par Flu vaccine 10/2020 left im delt\par Pneumococcal 23 to be administered in 2020\par DEXA due 2021 has rx\par CXR 10/ 2020 \par Agreeable to COVID19 vaccine after his procedure \par \par CARDIO Echo wnl mild mitral regurg\par \par discussed taking necessary precautions when at work and out of the house during Covid19 pandemic, recommended N95 mask and social distancing, good hand hygiene. avoid crowds. \par \par Pt participated in Sera trials \par \par f/u as scheduled for research 6/22 sooner if needs clearance

## 2021-05-20 NOTE — HISTORY OF PRESENT ILLNESS
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 [Pseudomonas] : Pseudomonas [Other: ___] : [unfilled] [___] : the last positive Pseudomonas result was [unfilled] [Last AFB Date: ___] : the AFB was performed  [unfilled] [None] : ~He/She~ has no hemoptysis [FVC ___] : the FVC was [unfilled] liters [FEV1: ___] : the FEV1 was [unfilled] liters [] : vest daily [Other ___] : exercise [unfilled] [Fair] : fair [Pancreatic Insufficiency] : pancreatic insufficiency [Pancreatic Enzyme Supp.] : uses pancreatic enzyme supplements [CFRD] : CFRD [Date: ___] : The last HgA1C was performed on [unfilled] [HgA1C Value: ___] : HgA1C value was [unfilled]  [AFB] : the patient had a MAC negative AFB [Headache] : no headache [Congestion] : no nasal congestion [Sinus Pain] : no sinus pain [Pancreatitis] : no pancreatitis [Diarrhea] : no diarrhea [Constipation] : no constipation [Steatorrhea] : no steatorrhea [de-identified] : 29 y/o male CF pt who was dx'ed CF at 6 mo old, + sweat, + 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 Pt also has chronic sinusitis and PI.\par \par Subject completed [id number].\par Patient seen today for acute sick visit called office last week endorsing increased cough and thick dark green mucous production. On Linezolid day 5 with some improvement. evening cough is better. Pt has been off of study drug for about 3 months, last dose was 2/22/21. Still having increased cough, mucous production, crackles from baseline on study drug. Lower energy levels. Itchy watery eyes, scratchy throat, sneezing. Took Claritin D over the weekend for seasonal  allergies. Endorsing foul smelling Diarrhea, bloating and gas. 3-4 BMs watery daily since starting Linezolid. + nausea  Decreased energy. Appetite baseline. No sob, wheeze, hemoptysis, chest tightness, constipation, sinus congestion, rash, swelling, sore throat, eye pain, ear pain, thrush.\par \par Subject completed [id number] on 11/13/19. \par Research [id number]. Pt reports no adverse events or side effects from current study drug. Subject had lapse in treatment and was off of study drug from 9/21/2020-10/7/2020. Resumed triple therapy on 10/8/20. Stopped on 2/23/21 pending sperm extraction. \par \par PULM: ACT alb/HS/pulm <QD with Vest/Aerobika. 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 \par Advair \par \par ENT: No recent recurrence of thrush. No Tinnitus, sore throat, no acute sinus sxs.\par \par ENDO: Bone Density 11/28/16' normal T score-1.3, CFRD supposed to be on insulin pending omnipod still. Saw Dr. Salazar FSBGs avg 150s, 2 hypos in last month <60 resolves with glucose. On Novolog 1-2Units with meals. \par \par Urology/Conception: no Vas Deferens Pt has postponed his sperm extraction after 5/24/21 Pt has been off modulator since 2/23/21 and will be for minimum of 3 months prior to procedure. Not actively trying to conceive still using condoms and protection. Wife had genetic testing done. \par \par Nutrition: Using Creon as ordered, no issue.  No GI complaints today. 1-2 BMs daily. Good appetite weight has been stable\par \par Sleep-DIS using Ambien 5mg < weekly. Endorsing 15-30 minutes with Ambien to initiate sleep. Poor sleep 5-6 hours a night\par \par Cardio: biphasic P waves on EKG, wnl ECHO 2/2020 mild mitral regurg\par \par Social:  2019, not eligible for [employer] due to income. [employer], [employer].

## 2021-05-21 ENCOUNTER — NON-APPOINTMENT (OUTPATIENT)
Age: 31
End: 2021-05-21

## 2021-05-24 LAB
B PERT DNA SPEC QL NAA+PROBE: NOT DETECTED
C PNEUM DNA SPEC QL NAA+PROBE: NOT DETECTED
FLUAV SUBTYP SPEC NAA+PROBE: NOT DETECTED
FLUBV RNA SPEC QL NAA+PROBE: NOT DETECTED
HADV DNA SPEC QL NAA+PROBE: NOT DETECTED
HCOV 229E RNA SPEC QL NAA+PROBE: NOT DETECTED
HCOV HKU1 RNA SPEC QL NAA+PROBE: NOT DETECTED
HCOV NL63 RNA SPEC QL NAA+PROBE: NOT DETECTED
HCOV OC43 RNA SPEC QL NAA+PROBE: NOT DETECTED
HMPV RNA SPEC QL NAA+PROBE: NOT DETECTED
HPIV1 RNA SPEC QL NAA+PROBE: NOT DETECTED
HPIV2 RNA SPEC QL NAA+PROBE: NOT DETECTED
HPIV3 RNA SPEC QL NAA+PROBE: NOT DETECTED
HPIV4 RNA SPEC QL NAA+PROBE: NOT DETECTED
RAPID RVP RESULT: NOT DETECTED
RSV RNA SPEC QL NAA+PROBE: NOT DETECTED
RV+EV RNA SPEC QL NAA+PROBE: NOT DETECTED
SARS-COV-2 RNA PNL RESP NAA+PROBE: NOT DETECTED

## 2021-05-25 ENCOUNTER — APPOINTMENT (OUTPATIENT)
Dept: PULMONOLOGY | Facility: CLINIC | Age: 31
End: 2021-05-25
Payer: COMMERCIAL

## 2021-05-25 ENCOUNTER — OUTPATIENT (OUTPATIENT)
Dept: OUTPATIENT SERVICES | Facility: HOSPITAL | Age: 31
LOS: 1 days | End: 2021-05-25

## 2021-05-25 VITALS
TEMPERATURE: 98 F | RESPIRATION RATE: 16 BRPM | DIASTOLIC BLOOD PRESSURE: 70 MMHG | OXYGEN SATURATION: 99 % | HEIGHT: 66 IN | SYSTOLIC BLOOD PRESSURE: 120 MMHG | WEIGHT: 132.06 LBS | HEART RATE: 77 BPM

## 2021-05-25 VITALS
HEIGHT: 65 IN | HEART RATE: 65 BPM | TEMPERATURE: 98.3 F | WEIGHT: 130 LBS | OXYGEN SATURATION: 99 % | BODY MASS INDEX: 21.66 KG/M2

## 2021-05-25 VITALS — SYSTOLIC BLOOD PRESSURE: 120 MMHG | DIASTOLIC BLOOD PRESSURE: 75 MMHG

## 2021-05-25 DIAGNOSIS — N46.01 ORGANIC AZOOSPERMIA: ICD-10-CM

## 2021-05-25 DIAGNOSIS — Z87.898 PERSONAL HISTORY OF OTHER SPECIFIED CONDITIONS: ICD-10-CM

## 2021-05-25 DIAGNOSIS — Z98.890 OTHER SPECIFIED POSTPROCEDURAL STATES: Chronic | ICD-10-CM

## 2021-05-25 PROCEDURE — 94726 PLETHYSMOGRAPHY LUNG VOLUMES: CPT

## 2021-05-25 PROCEDURE — 94729 DIFFUSING CAPACITY: CPT

## 2021-05-25 PROCEDURE — ZZZZZ: CPT

## 2021-05-25 PROCEDURE — 99213 OFFICE O/P EST LOW 20 MIN: CPT | Mod: 25

## 2021-05-25 PROCEDURE — 94010 BREATHING CAPACITY TEST: CPT

## 2021-05-25 RX ORDER — SODIUM CHLORIDE 9 MG/ML
1 INJECTION INTRAMUSCULAR; INTRAVENOUS; SUBCUTANEOUS
Qty: 0 | Refills: 0 | DISCHARGE

## 2021-05-25 RX ORDER — CETIRIZINE HYDROCHLORIDE 10 MG/1
1 TABLET ORAL
Qty: 0 | Refills: 0 | DISCHARGE

## 2021-05-25 RX ORDER — DORNASE ALFA 1 MG/ML
1 SOLUTION RESPIRATORY (INHALATION)
Qty: 0 | Refills: 0 | DISCHARGE

## 2021-05-25 NOTE — H&P PST ADULT - NSICDXPASTMEDICALHX_GEN_ALL_CORE_FT
PAST MEDICAL HISTORY:  Bronchiectasis     Cystic fibrosis with pulmonary manifestations     H/O male infertility

## 2021-05-25 NOTE — H&P PST ADULT - LYMPH NODES
Please call the patient regarding labs - thyroid function tests normal - she has antibodies which can cause inflammation of thyroid - however the thyroid gland is functioning ok now so will monitor detailed exam

## 2021-05-25 NOTE — H&P PST ADULT - NSANTHOSAYNRD_GEN_A_CORE
No. QUIQUE screening performed.  STOP BANG Legend: 0-2 = LOW Risk; 3-4 = INTERMEDIATE Risk; 5-8 = HIGH Risk

## 2021-05-25 NOTE — PHYSICAL EXAM
[General Appearance - Well Developed] : well developed [Normal Appearance] : normal appearance [Well Groomed] : well groomed [General Appearance - In No Acute Distress] : no acute distress [Normal Conjunctiva] : the conjunctiva exhibited no abnormalities [Normal Oral Mucosa] : normal oral mucosa [No Oral Pallor] : no oral pallor [Normal Oropharynx] : normal oropharynx [Neck Appearance] : the appearance of the neck was normal [Neck Cervical Mass (___cm)] : no neck mass was observed [Heart Rate And Rhythm] : heart rate was normal and rhythm regular [Heart Sounds] : normal S1 and S2 [Heart Sounds Gallop] : no gallops [Murmurs] : no murmurs [Heart Sounds Pericardial Friction Rub] : no pericardial rub [] : no respiratory distress [Exaggerated Use Of Accessory Muscles For Inspiration] : no accessory muscle use [Respiration, Rhythm And Depth] : normal respiratory rhythm and effort [Bowel Sounds] : normal bowel sounds [Abdomen Soft] : soft [Abdomen Tenderness] : non-tender [Abdomen Mass (___ Cm)] : no abdominal mass palpated [Abnormal Walk] : normal gait [Musculoskeletal - Swelling] : no joint swelling seen [Cyanosis, Localized] : no localized cyanosis [Skin Color & Pigmentation] : normal skin color and pigmentation [No Focal Deficits] : no focal deficits [Oriented To Time, Place, And Person] : oriented to person, place, and time [Impaired Insight] : insight and judgment were intact [Affect] : the affect was normal [FreeTextEntry1] : fingernail clubbing noted

## 2021-05-25 NOTE — H&P PST ADULT - NEGATIVE MUSCULOSKELETAL SYMPTOMS
no arthralgia/no arthritis/no joint swelling/no myalgia/no muscle weakness/no stiffness/no neck pain

## 2021-05-25 NOTE — H&P PST ADULT - RESPIRATORY AND THORAX COMMENTS
Exacerbation of CF started last week, with usual symptoms including productive cough with dark green mucous,  joint pain, fatigue.  Pt started on Bactrim 5/21/21 x 2 week course .  Was seen by Pulmonologist today fo rpre-op clearance Pt reports he was doing well on experimental drug for CF, but he had to come off this  drug for at least 90 days prior to sperm retrieval.. Exacerbation of CF started last week, with usual symptoms including productive cough with dark green mucous,  joint pain, fatigue.  Pt started on Bactrim 5/21/21 x 2 week course .  Was seen by Pulmonologist today fo pre-op clearance.

## 2021-05-25 NOTE — H&P PST ADULT - OTHER CARE PROVIDERS
Dr. Dilcia Wesley,  or Dr. Cortez Pulmonologist [phone] Dr. Dilcia Wesley,  or Dr. Cortez, Pulmonologists [phone]

## 2021-05-25 NOTE — HISTORY OF PRESENT ILLNESS
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 [Pseudomonas] : Pseudomonas [Other: ___] : [unfilled] [___] : the last positive Pseudomonas result was [unfilled] [Last AFB Date: ___] : the AFB was performed  [unfilled] [None] : ~He/She~ has no hemoptysis [FVC ___] : the FVC was [unfilled] liters [FEV1: ___] : the FEV1 was [unfilled] liters [] : vest daily [Other ___] : exercise [unfilled] [Fair] : fair [Pancreatic Insufficiency] : pancreatic insufficiency [Pancreatic Enzyme Supp.] : uses pancreatic enzyme supplements [CFRD] : CFRD [Date: ___] : The last HgA1C was performed on [unfilled] [HgA1C Value: ___] : HgA1C value was [unfilled]  [AFB] : the patient had a MAC negative AFB [Headache] : no headache [Congestion] : no nasal congestion [Sinus Pain] : no sinus pain [Pancreatitis] : no pancreatitis [Diarrhea] : no diarrhea [Constipation] : no constipation [Steatorrhea] : no steatorrhea [de-identified] : 31 y/o male CF pt who was dx'ed CF at 6 mo old, + sweat, + 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 Pt also has chronic sinusitis and PI.\par \par Subject completed [id number].\par Patient seen today for acute sick visit currently on Bactrim day 5. Stopped Linezolid after 5 days due to diarrhea. Feeling improvement in cough, still having thick dark green mucous production. Still hears crackles in his chest. Pt has been off of study drug for about 3 months, last dose was 2/22/21. Lower energy levels. Itchy watery eyes, scratchy throat, sneezing. Took Claritin D over the weekend for seasonal  allergies. Decreased energy. Appetite baseline. Rash on left sided groin and thrush in mouth. Started Nystatin cream and rinse, with Culturelle. No sob, wheeze, hemoptysis, chest tightness, constipation, sinus congestion, swelling, sore throat, eye pain, ear pain. Scheduled for Sperm extraction on 6/2 with Dr Josue pending clearance and preop testing. \par \par Subject completed [id number] on 11/13/19. \par Research [id number]. Pt reports no adverse events or side effects from current study drug. Subject had lapse in treatment and was off of study drug from 9/21/2020-10/7/2020. Resumed triple therapy on 10/8/20. Stopped on 2/23/21 pending sperm extraction. \par \par PULM: ACT alb/HS/pulm <QD with Vest/Aerobika. 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 \par Advair \par \par ENT: No recent recurrence of thrush. No Tinnitus, sore throat, no acute sinus sxs.\par \par ENDO: Bone Density 11/28/16' normal T score-1.3, CFRD supposed to be on insulin pending omnipod still. Saw Dr. Salazar FSBGs avg 150s, 2 hypos in last month <60 resolves with glucose. On Novolog 1-2Units with meals. \par \par Urology/Conception: no Vas Deferens Pt has postponed his sperm extraction after 5/24/21 Pt has been off modulator since 2/23/21 and will be for minimum of 3 months prior to procedure. Not actively trying to conceive still using condoms and protection. Wife had genetic testing done. \par \par Nutrition: Using Creon as ordered, no issue.  No GI complaints today. 1-2 BMs daily. Good appetite weight has been stable\par \par Sleep-DIS using Ambien 5mg < weekly. Endorsing 15-30 minutes with Ambien to initiate sleep. Poor sleep 5-6 hours a night\par \par Cardio: biphasic P waves on EKG, wnl ECHO 2/2020 mild mitral regurg\par \par Social:  2019, not eligible for [employer] due to income. [employer], [employer].

## 2021-05-25 NOTE — H&P PST ADULT - GENITOURINARY COMMENTS
hx of Cystic Fibrosis with infertility.  Scheduled for Bilateral Testis Biopsy for diagnosis sperm retrieval and cryopreservation.

## 2021-05-25 NOTE — H&P PST ADULT - NEUROLOGICAL DETAILS
Pt weigh in before surgery    tanita scale: 248.4lb wt           43.3bmi           56.9% fat           141.4lb fat mass           107lb ffm           78.2lb tbw    Pt successfully completed hydration protocol with nurse.   alert and oriented x 3/sensation intact/cranial nerves intact/normal strength

## 2021-05-25 NOTE — H&P PST ADULT - ASSESSMENT
29 y/o male with hx of Cystic Fibrosis with infertility.  Scheduled for Bilateral Testis Biopsy for diagnosis sperm retrieval and cryopreservation.

## 2021-05-25 NOTE — H&P PST ADULT - NSICDXPROBLEM_GEN_ALL_CORE_FT
PROBLEM DIAGNOSES  Problem: H/O male infertility  Assessment and Plan: Bilateral Testis Biopsy for diagnosis sperm retrieval and cryopreservation.  CBC CMP EKG on chart  Pre-op instructions given and explained    Problem: H/O cystic fibrosis  Assessment and Plan: Pt reports that he saw his Pulmonologist today for pre-op  Pulmonary clearance, requested on chart.  PFT's done today, on chart  Pt scheduled for surgery at [facility], to discuss with Anesthesia

## 2021-05-25 NOTE — END OF VISIT
[Time Spent: ___ minutes] : I have spent [unfilled] minutes of time on the encounter. [FreeTextEntry3] : I agree with the advanced clinical provider's history, physical examination and plan of care. I personally elicited a history and examined the patient. See above attestation. 45 minutes time spent for patient education related to comorbidities and medications, medical records/labs/radiology reviews, preventative care, documentation.\par This is patient's 3rd pulmonary exacerbation since stopping trikafta in anticipation of sperm extraction procedure. Patient recovered FEV1 post 2nd Pex. But baseline clinical status does not last long post-antibiotic course as pt was only recently on PO minocycline about 2 weeks ago. Having diarrhea now on linezolid day 5. check stool for c. diff. if negative, switch PO antibiotic to bactrim TID plus probiotics. D/w pt about IV course within next month if he is not better and to consider time off from work, increasing TST as he only gets 5 hours of sleep but can sleep 10 hours minimum on days off which would help him recover sooner. Losing weight, advised to increase caloric intake. I will reach out to urology to try to move up procedure, as long as it is post 5/24/21 (90 days off Trikafta). \par \par

## 2021-05-25 NOTE — H&P PST ADULT - HISTORY OF PRESENT ILLNESS
31 y/o male with hx of Cystic Fibrosis with infertility.  Scheduled for Bilateral Testis Biopsy for diagnosis sperm retrieval and cryopreservation.

## 2021-05-25 NOTE — H&P PST ADULT - MUSCULOSKELETAL COMMENTS
pt reports joint pain with exacerbation of CF symptoms last week Pt reports joint pain with exacerbation of CF symptoms last week, now resolved

## 2021-05-25 NOTE — ASSESSMENT
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 Pt also has chronic sinusitis and PI. 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 \par \par Patient here today acutely ill stopped Day 5 of PO Linezolid due to diarrhea which has since resolved. Now on Day 5 of PO Bactrim feeling improvement. Has thrush and fungal rash in groin on Nystatin. Needs clearance for sperm extraction on 6/2 with Dr Josue. Pt had significant drop in FEV1 today 59% from 78% (also off of study drug for about 5 weeks). Prior baseline on study drug was 84-79%.\par \par Subject completed [id number] on 11/13/19.\par Pt had discontinued study drug on 9/21/2020-10/7/20. And resumed on 10/8/20. Now off again since 2/23/21 pending sperm extraction around 5/24/21. \par Research enrolled in [id number] - Pt would like to continue to participate in the study at this time and feels greater benefit while being on study drug. Interested in  and substudy for Sweat Chloride home testing.\par - discussed risk and benefits for patient and potential fetus of being on modulator during sperm extraction and advised to hold triple therapy 90 days before\par \par ACT: albuterol > HS 3% >Pulmozyme, Using Aerobika less than daily\par - Fev1 59% (5/25/21), 78% (3/30/21 off study drug), 84% (1/8/20), 78% (10/16/20),  81% (7/21/20), 79% (1/13/20), 81% (12/11/19), 81% (11/26/19), 81% (11/13/19), 84% (6/2019)\par - REINFORCED AGAIN TODAY the importance of pt performing ACT daily and up to TID at minimum twice a day\par - Cont Advair \par - Amenable to Mannitol tolerance test. \par - Cont Bactrim\par - Cont Nystatin rinse and cream, probiotic\par - Discussed in depth with pt the importance of him performing his maintenance medications and airway clearance. If he does not improve significantly and get his lung function up we will not be able to safely clear him for his procedure. Pt verbalized understanding.\par - MUST send home aisha today for comparison, will also need to send on on Monday \par \par ID: Chronic MRSA and Burkholderia multivor. \par - Discussed targeting MRSA as likely causative agent for exacerbations pt amenable to nebulized antibiotics MRSA greater quantity on recent cultures. Burkholderia (S) to [place] can consider if needed. \par Tolerated Bethkis and tobramycin in past, did not tolerate TOBIpod, [place] was also tolerated. \par - Tolerated inhaled Vancomycin (pt says itchy throat was mild) no side effects during recent administration \par - Cont [place] and Vanco inh QOM \par - Advised annual audiometry testing \par \par CXR:10/10/20 Reticular opacities and bronchiectasis slight improvement in L mid to lower lung periphery but other wise no significant internal change \par \par CFRD: Poor compliance/adherence with Freestyle yonathan and consistent insulin. On 1-2 Units novolog with meals. Pending Omnipod insulin pump but high out of pocket cost. \par - f/u with Dr Salazar as recommended \par - Cont insulin regimen and monitoring FSBGs \par \par Nutrition: Using Creon as ordered 5-6 with meals, 3-4 with snacks, no issue.  No GI complaints today\par \par Sleep-DIS using Ambien 5mg 2x/week and benefitting. Endorsing 15-30 minutes with Ambien to initiate sleep. TST 5-6hours\par - Reinforced improving sleep habits, go to bed earlier\par \par ENT: Hasn't f/u with ENT, or repeated CT sinus. Sinus rinses and Flonase PRN \par \par Bone Density 11/28/16' normal T score-1.3\par - repeat in 2021 given RX AGAIN \par \par Urology/Conception: no Vas Deferens. Pt aware he will have to be off modulator for minimum of 3 months prior to procedure. Not actively trying to conceive still using condoms and protection. Wife had genetic testing done. \par - schedule Sperm extraction for after 5/24/21 \par - Discussed importance of continuing to use barrier protection even once wife is pregnant. \par - Recommended pt come in for pulmonary clearance prior to procedure if anaesthesia other than local is being administered. \par - Tentative Procedure for 6/2 pending pulmonary clearance from CF provider and preop testing\par - Advised pt to make Dr Josue aware of his fungal rash in groin immediately. \par \par \par Health Maintenance:\par Annual BW due next OV\par Flu vaccine 10/2020 left im delt\par Pneumococcal 23 to be administered in 2020\par DEXA due 2021 has rx\par CXR 10/ 2020 \par Agreeable to COVID19 vaccine after his procedure \par \par CARDIO Echo wnl mild mitral regurg\par \par Pt participated in Sera trials \par \par f/u Tuesday and as scheduled for research 6/22

## 2021-05-27 PROBLEM — Z87.898 PERSONAL HISTORY OF OTHER SPECIFIED CONDITIONS: Chronic | Status: ACTIVE | Noted: 2021-05-25

## 2021-05-29 PROBLEM — N46.01 AZOOSPERMIA: Status: ACTIVE | Noted: 2020-08-18

## 2021-05-30 ENCOUNTER — APPOINTMENT (OUTPATIENT)
Dept: DISASTER EMERGENCY | Facility: CLINIC | Age: 31
End: 2021-05-30

## 2021-06-01 ENCOUNTER — TRANSCRIPTION ENCOUNTER (OUTPATIENT)
Age: 31
End: 2021-06-01

## 2021-06-01 ENCOUNTER — APPOINTMENT (OUTPATIENT)
Dept: PULMONOLOGY | Facility: CLINIC | Age: 31
End: 2021-06-01
Payer: COMMERCIAL

## 2021-06-01 VITALS
OXYGEN SATURATION: 99 % | WEIGHT: 132.06 LBS | HEART RATE: 73 BPM | SYSTOLIC BLOOD PRESSURE: 117 MMHG | DIASTOLIC BLOOD PRESSURE: 79 MMHG | RESPIRATION RATE: 16 BRPM | TEMPERATURE: 97 F | HEIGHT: 66 IN

## 2021-06-01 DIAGNOSIS — Z01.818 ENCOUNTER FOR OTHER PREPROCEDURAL EXAMINATION: ICD-10-CM

## 2021-06-01 PROCEDURE — 99214 OFFICE O/P EST MOD 30 MIN: CPT | Mod: 95

## 2021-06-01 NOTE — HISTORY OF PRESENT ILLNESS
[FreeTextEntry1] : The patient presents for a bilateral testis biopsy for diagnosis, sperm retrieval and cryopreservation.  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  He is on an investigational medication and is off of it for greater than 90 days  awaiting his surgery. \par \par HPI: Patient presented initially with the chief complaint of history of cystic fibrosis and questioned about his fertility potential for evaluation. His girlfriend has not had any prior pregnancies. He reports that they have not been trying to have children, but have had unprotected intercourse had no pregnancies. .  His past medical history demonstrates a history of cystic fibrosis mild diabetes and asthma (see patient questionnaire).  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  He does not smoke and drinks only socially.  He has an allergy to fentanyl.  His review of systems is non-contributory. His family history is not significant.\par \par The patient reports that he has heard at cystic fibrosis can be associated with reproductive problems and would like to be evaluated. the patient is currently on an ambulatory intravenous antibiotic regimen for a recent pulmonary infection he reports he gets these types of infections approximately 4 times a year\par \par

## 2021-06-01 NOTE — ASSESSMENT
[FreeTextEntry1] : The patient presents for a bilateral testis biopsy for diagnosis, sperm retrieval and cryopreservation.  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  He is on an investigational medication and is off of it for greater than 90 days  awaiting his surgery. He understands that this drug might have an effect on sperm production, the quality of the sperm and the potential fertilizing ability of his sperm as well as the potential for hindering an ongoing pregnancy even though he has been off the drug for greater than 90 days. This has been discussed with him on several occasions by his  doctor and myself.\par \par We have also discussed the proposed procedure with the relative risks and benefits including the possibility that sperm may not be retrieved in any sperm retrieved might not be successfully used in an IVF procedure. Recent blood tests were essentially normal except for hemoglobin A1c which is elevated at 6.7.  He is under treatment for his diabetes.  He had come off his investigational medications in anticipation of surgery however became ill and needed to be treated. We will prefer local or regional anesthesia rather than a general anesthesia.He asked questions which were answered to his satisfaction. He wishes to proceed.\par

## 2021-06-01 NOTE — PHYSICAL EXAM
[General Appearance - Well Developed] : well developed [General Appearance - Well Nourished] : well nourished [Normal Appearance] : normal appearance [Well Groomed] : well groomed [General Appearance - In No Acute Distress] : no acute distress [Oriented To Time, Place, And Person] : oriented to person, place, and time [Affect] : the affect was normal [Mood] : the mood was normal [Not Anxious] : not anxious [Abdomen Soft] : soft [Urethral Meatus] : meatus normal [Urinary Bladder Findings] : the bladder was normal on palpation [Scrotum] : the scrotum was normal [Testes Mass (___cm)] : there were no testicular masses [FreeTextEntry1] : no palpable vas deferens bilaterally [Skin Turgor] : supple [Heart Rate And Rhythm] : Heart rate and rhythm were normal [] : no respiratory distress [Respiration, Rhythm And Depth] : normal respiratory rhythm and effort [Normal Station and Gait] : the gait and station were normal for the patient's age [No Focal Deficits] : no focal deficits [No Palpable Adenopathy] : no palpable adenopathy

## 2021-06-01 NOTE — HISTORY OF PRESENT ILLNESS
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 [Pseudomonas] : Pseudomonas [Other: ___] : [unfilled] [___] : the last positive Pseudomonas result was [unfilled] [Last AFB Date: ___] : the AFB was performed  [unfilled] [None] : ~He/She~ has no hemoptysis [FVC ___] : the FVC was [unfilled] liters [FEV1: ___] : the FEV1 was [unfilled] liters [] : vest daily [Other ___] : exercise [unfilled] [Fair] : fair [Pancreatic Insufficiency] : pancreatic insufficiency [Pancreatic Enzyme Supp.] : uses pancreatic enzyme supplements [CFRD] : CFRD [Date: ___] : The last HgA1C was performed on [unfilled] [HgA1C Value: ___] : HgA1C value was [unfilled]  [AFB] : the patient had a MAC negative AFB [Headache] : no headache [Congestion] : no nasal congestion [Sinus Pain] : no sinus pain [Pancreatitis] : no pancreatitis [Diarrhea] : no diarrhea [Constipation] : no constipation [Steatorrhea] : no steatorrhea [de-identified] : 29 y/o male CF pt who was dx'ed CF at 6 mo old, + sweat, + 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 Pt also has chronic sinusitis and PI.\par \par Subject completed [id number].\par Patient seen today for pulmonary clearance. Currently on Bactrim PO Day 12. Feeling much better, improvement in cough and mucous is thinner and clear. Pt has been off of study drug for about 3 months, last dose was 2/22/21. Rash  in groin and oral thrush has resolved. Performing ACT BID. No fevers, sob, wheeze, hemoptysis, chest tightness, constipation, sinus congestion, swelling, sore throat, eye pain, ear pain. Sperm Extraction 6/2/21 with urologist Dr. Jah Josue\par \par Subject completed [id number] on 11/13/19. \par Research [id number]. Pt reports no adverse events or side effects from current study drug. Subject had lapse in treatment and was off of study drug from 9/21/2020-10/7/2020. Resumed triple therapy on 10/8/20. Stopped on 2/23/21 pending sperm extraction. \par \par PULM: ACT alb/HS/pulm BID with Vest/Aerobika. 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 \par Advair \par \par ENT: No Tinnitus, sore throat, no acute sinus sxs. Thrush resolved. \par \par ENDO: Bone Density 11/28/16' normal T score-1.3, CFRD supposed to be on insulin pending omnipod still. Saw Dr. Salazar FSBGs avg 150s, 2 hypos in last month <60 resolves with glucose. On Novolog 1-2Units with meals. \par \par Urology/Conception: no Vas Deferens Pt has postponed his sperm extraction after 5/24/21 Pt has been off modulator since 2/23/21 and will be for minimum of 3 months prior to procedure. Not actively trying to conceive still using condoms and protection. Wife had genetic testing done. \par \par Nutrition: Using Creon as ordered, no issue.  No GI complaints today. 1-2 BMs daily. Good appetite weight has been stable\par \par Sleep-DIS using Ambien 5mg < weekly. Endorsing 15-30 minutes with Ambien to initiate sleep. Poor sleep 5-6 hours a night\par \par Cardio: biphasic P waves on EKG, wnl ECHO 2/2020 mild mitral regurg\par \par Social:  2019, not eligible for DVTel due to income. [employer], [employer].

## 2021-06-01 NOTE — REASON FOR VISIT
[Follow-Up] : a follow-up visit [Home] : at home, [unfilled] , at the time of the visit. [Medical Office: ([facility])___] : at the medical office located in  [Verbal consent obtained from patient] : the patient, [unfilled] [FreeTextEntry1] : CF

## 2021-06-01 NOTE — HISTORY OF PRESENT ILLNESS
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 [Pseudomonas] : Pseudomonas [Other: ___] : [unfilled] [___] : the last positive Pseudomonas result was [unfilled] [Last AFB Date: ___] : the AFB was performed  [unfilled] [None] : ~He/She~ has no hemoptysis [FVC ___] : the FVC was [unfilled] liters [FEV1: ___] : the FEV1 was [unfilled] liters [] : vest daily [Other ___] : exercise [unfilled] [Fair] : fair [Pancreatic Insufficiency] : pancreatic insufficiency [Pancreatic Enzyme Supp.] : uses pancreatic enzyme supplements [CFRD] : CFRD [Date: ___] : The last HgA1C was performed on [unfilled] [HgA1C Value: ___] : HgA1C value was [unfilled]  [AFB] : the patient had a MAC negative AFB [Headache] : no headache [Congestion] : no nasal congestion [Sinus Pain] : no sinus pain [Diarrhea] : no diarrhea [Pancreatitis] : no pancreatitis [Constipation] : no constipation [Steatorrhea] : no steatorrhea [de-identified] : 31 y/o male CF pt who was dx'ed CF at 6 mo old, + sweat, + 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 Pt also has chronic sinusitis and PI.\par \par Subject completed [id number].\par Patient seen today for pulmonary clearance. Currently on Bactrim PO Day 12. Feeling much better, improvement in cough and mucous is thinner and clear. Pt has been off of study drug for about 3 months, last dose was 2/22/21. Rash  in groin and oral thrush has resolved. Performing ACT BID. No fevers, sob, wheeze, hemoptysis, chest tightness, constipation, sinus congestion, swelling, sore throat, eye pain, ear pain. Sperm Extraction 6/2/21 with urologist Dr. Jah Josue\par \par Subject completed [id number] on 11/13/19. \par Research [id number]. Pt reports no adverse events or side effects from current study drug. Subject had lapse in treatment and was off of study drug from 9/21/2020-10/7/2020. Resumed triple therapy on 10/8/20. Stopped on 2/23/21 pending sperm extraction. \par \par PULM: ACT alb/HS/pulm BID with Vest/Aerobika. 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 \par Advair \par \par ENT: No Tinnitus, sore throat, no acute sinus sxs. Thrush resolved. \par \par ENDO: Bone Density 11/28/16' normal T score-1.3, CFRD supposed to be on insulin pending omnipod still. Saw Dr. Salazar FSBGs avg 150s, 2 hypos in last month <60 resolves with glucose. On Novolog 1-2Units with meals. \par \par Urology/Conception: no Vas Deferens Pt has postponed his sperm extraction after 5/24/21 Pt has been off modulator since 2/23/21 and will be for minimum of 3 months prior to procedure. Not actively trying to conceive still using condoms and protection. Wife had genetic testing done. \par \par Nutrition: Using Creon as ordered, no issue.  No GI complaints today. 1-2 BMs daily. Good appetite weight has been stable\par \par Sleep-DIS using Ambien 5mg < weekly. Endorsing 15-30 minutes with Ambien to initiate sleep. Poor sleep 5-6 hours a night\par \par Cardio: biphasic P waves on EKG, wnl ECHO 2/2020 mild mitral regurg\par \par Social:  2019, not eligible for Dailyevent due to income. [employer], [employer].

## 2021-06-01 NOTE — ASSESSMENT
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 Pt also has chronic sinusitis and PI. 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 \par \par Patient seen today on telehealth video for Pulmonary Clearance for Sperm Extraction on 6/2/21 with Dr. Jah Josue. Noticeable improvement in respiratory symptoms. Home spirometer improved since last week up today to 76%. Prior baseline on study drug was 84-79%.\par \par Subject completed [id number] on 11/13/19.\par Pt had discontinued study drug on 9/21/2020-10/7/20. And resumed on 10/8/20. Now off again since 2/23/21 pending sperm extraction around 5/24/21. \par Research enrolled in [id number] - Pt would like to continue to participate in the study at this time and feels greater benefit while being on study drug. Interested in  and substudy for Sweat Chloride home testing.\par - discussed risk and benefits for patient and potential fetus of being on modulator during sperm extraction and advised to hold triple therapy 90 days before\par \par ACT: albuterol > HS 3% >Pulmozyme, Using Aerobika less than daily\par - Fev1 76% (home aisha 6/1/21), 59% (5/25/21), 78% (3/30/21 off study drug), 84% (1/8/20), 78% (10/16/20),  81% (7/21/20), 79% (1/13/20), 81% (12/11/19), 81% (11/26/19), 81% (11/13/19), 84% (6/2019)\par - REINFORCED AGAIN TODAY the importance of pt performing ACT daily and up to TID at minimum twice a day\par - Cont Advair \par - Amenable to Mannitol tolerance test. \par - Cont Bactrim\par - Discussed in depth with pt the importance of him performing his maintenance medications and airway clearance.\par \par ID: Chronic MRSA and Burkholderia multivor. \par - Discussed targeting MRSA as likely causative agent for exacerbations pt amenable to nebulized antibiotics MRSA greater quantity on recent cultures. Burkholderia (S) to [place] can consider if needed. \par Tolerated Bethkis and tobramycin in past, did not tolerate TOBIpod, [place] was also tolerated. \par - Tolerated inhaled Vancomycin (pt says itchy throat was mild) no side effects during recent administration \par - Cont [place] and Vanco inh QOM resume after completion of PO antibx\par - Advised annual audiometry testing \par \par CXR:10/10/20 Reticular opacities and bronchiectasis slight improvement in L mid to lower lung periphery but other wise no significant internal change \par \par CFRD: Poor compliance/adherence with Freestyle yonathan and consistent insulin. On 1-2 Units novolog with meals. Pending Omnipod insulin pump but high out of pocket cost. \par - f/u with Dr Salazar as recommended \par - Cont insulin regimen and monitoring FSBGs\par - only on short acting pre-meal insulin (will hold as well NPO after midnight)\par \par Nutrition: Using Creon as ordered 5-6 with meals, 3-4 with snacks, no issue.  No GI complaints today\par - Hold Creon NPO after midnight for procedure\par \par Sleep-DIS using Ambien 5mg 2x/week and benefitting. Endorsing 15-30 minutes with Ambien to initiate sleep. TST 5-6hours\par \par ENT: Hasn't f/u with ENT, or repeated CT sinus. Sinus rinses and Flonase PRN \par \par Bone Density 11/28/16' normal T score-1.3\par - repeat in 2021 given RX AGAIN \par \par Urology/Conception: no Vas Deferens. Pt aware he will have to be off modulator for minimum of 3 months prior to procedure. Not actively trying to conceive still using condoms and protection. Wife had genetic testing done. Schedule Sperm extraction for after 5/24/21 now tomorrow 6/2/21\par - Discussed importance of continuing to use barrier protection even once wife is pregnant. \par \par Health Maintenance:\par Annual BW due next OV\par Flu vaccine 10/2020 left im delt\par Pneumococcal 23 to be administered in 2020\par DEXA due 2021 has rx\par CXR 10/ 2020 \par Agreeable to COVID19 vaccine after his procedure \par \par CARDIO Echo wnl mild mitral regurg\par \par Pt participated in Sera trials \par \par Preprocedure Respiratory Clearance - Patient is at Mild risk for pulmonary complications from Sperm Extraction PROCEDURE. Local with regional anaesthesia is preferred to further minimize respiratory risk. We recommended patient to continue ACT and maintenance medications after procedure.\par \par f/u next week

## 2021-06-01 NOTE — REASON FOR VISIT
[Follow-Up] : a follow-up visit [Home] : at home, [unfilled] , at the time of the visit. [Medical Office: ([facility])___] : at the medical office located in  [Verbal consent obtained from patient] : the patient, [unfilled] [FreeTextEntry1] : CF

## 2021-06-01 NOTE — END OF VISIT
[FreeTextEntry3] : I agree with the advanced clinical provider's history, physical examination and plan of care. I personally elicited a history and examined the patient. See above attestation. 35 minutes time spent for patient education related to comorbidities and medications, medical records/labs/radiology reviews, preventative care, documentation.\par \par \par  [Time Spent: ___ minutes] : I have spent [unfilled] minutes of time on the encounter.

## 2021-06-01 NOTE — ASSESSMENT
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 Pt also has chronic sinusitis and PI. 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 \par \par Patient seen today on telehealth video for Pulmonary Clearance for Sperm Extraction on 6/2/21 with Dr. Jah Josue. Noticeable improvement in respiratory symptoms. Home spirometer improved since last week up today to 76%. Prior baseline on study drug was 84-79%.\par \par Subject completed [id number] on 11/13/19.\par Pt had discontinued study drug on 9/21/2020-10/7/20. And resumed on 10/8/20. Now off again since 2/23/21 pending sperm extraction around 5/24/21. \par Research enrolled in [id number] - Pt would like to continue to participate in the study at this time and feels greater benefit while being on study drug. Interested in  and substudy for Sweat Chloride home testing.\par - discussed risk and benefits for patient and potential fetus of being on modulator during sperm extraction and advised to hold triple therapy 90 days before\par \par ACT: albuterol > HS 3% >Pulmozyme, Using Aerobika less than daily\par - Fev1 76% (home aisha 6/1/21), 59% (5/25/21), 78% (3/30/21 off study drug), 84% (1/8/20), 78% (10/16/20),  81% (7/21/20), 79% (1/13/20), 81% (12/11/19), 81% (11/26/19), 81% (11/13/19), 84% (6/2019)\par - REINFORCED AGAIN TODAY the importance of pt performing ACT daily and up to TID at minimum twice a day\par - Cont Advair \par - Amenable to Mannitol tolerance test. \par - Cont Bactrim\par - Discussed in depth with pt the importance of him performing his maintenance medications and airway clearance.\par \par ID: Chronic MRSA and Burkholderia multivor. \par - Discussed targeting MRSA as likely causative agent for exacerbations pt amenable to nebulized antibiotics MRSA greater quantity on recent cultures. Burkholderia (S) to [place] can consider if needed. \par Tolerated Bethkis and tobramycin in past, did not tolerate TOBIpod, [place] was also tolerated. \par - Tolerated inhaled Vancomycin (pt says itchy throat was mild) no side effects during recent administration \par - Cont [place] and Vanco inh QOM resume after completion of PO antibx\par - Advised annual audiometry testing \par \par CXR:10/10/20 Reticular opacities and bronchiectasis slight improvement in L mid to lower lung periphery but other wise no significant internal change \par \par CFRD: Poor compliance/adherence with Freestyle yonathan and consistent insulin. On 1-2 Units novolog with meals. Pending Omnipod insulin pump but high out of pocket cost. \par - f/u with Dr Salazar as recommended \par - Cont insulin regimen and monitoring FSBGs\par - only on short acting pre-meal insulin (will hold as well NPO after midnight)\par \par Nutrition: Using Creon as ordered 5-6 with meals, 3-4 with snacks, no issue.  No GI complaints today\par - Hold Creon NPO after midnight for procedure\par \par Sleep-DIS using Ambien 5mg 2x/week and benefitting. Endorsing 15-30 minutes with Ambien to initiate sleep. TST 5-6hours\par \par ENT: Hasn't f/u with ENT, or repeated CT sinus. Sinus rinses and Flonase PRN \par \par Bone Density 11/28/16' normal T score-1.3\par - repeat in 2021 given RX AGAIN \par \par Urology/Conception: no Vas Deferens. Pt aware he will have to be off modulator for minimum of 3 months prior to procedure. Not actively trying to conceive still using condoms and protection. Wife had genetic testing done. Schedule Sperm extraction for after 5/24/21 now tomorrow 6/2/21\par - Discussed importance of continuing to use barrier protection even once wife is pregnant. \par \par Health Maintenance:\par Annual BW due next OV\par Flu vaccine 10/2020 left im delt\par Pneumococcal 23 to be administered in 2020\par DEXA due 2021 has rx\par CXR 10/ 2020 \par Agreeable to COVID19 vaccine after his procedure \par \par CARDIO Echo wnl mild mitral regurg\par \par Pt participated in Sera trials \par \par Preprocedure Respiratory Clearance - Patient is at Mild risk for pulmonary complications from Sperm Extraction PROCEDURE. Local with regional anaesthesia is preferred to further minimize respiratory risk. We recommended patient to continue ACT and maintenance medications after procedure.\par \par f/u next week

## 2021-06-02 ENCOUNTER — APPOINTMENT (OUTPATIENT)
Dept: UROLOGY | Facility: AMBULATORY SURGERY CENTER | Age: 31
End: 2021-06-02

## 2021-06-02 ENCOUNTER — RESULT REVIEW (OUTPATIENT)
Age: 31
End: 2021-06-02

## 2021-06-02 ENCOUNTER — OUTPATIENT (OUTPATIENT)
Dept: OUTPATIENT SERVICES | Facility: HOSPITAL | Age: 31
LOS: 1 days | Discharge: HOME | End: 2021-06-02
Payer: COMMERCIAL

## 2021-06-02 ENCOUNTER — NON-APPOINTMENT (OUTPATIENT)
Age: 31
End: 2021-06-02

## 2021-06-02 VITALS — HEART RATE: 72 BPM | RESPIRATION RATE: 16 BRPM | TEMPERATURE: 98 F | OXYGEN SATURATION: 100 %

## 2021-06-02 DIAGNOSIS — N46.01 ORGANIC AZOOSPERMIA: ICD-10-CM

## 2021-06-02 DIAGNOSIS — Z98.890 OTHER SPECIFIED POSTPROCEDURAL STATES: Chronic | ICD-10-CM

## 2021-06-02 LAB — GLUCOSE BLDC GLUCOMTR-MCNC: 151 MG/DL — HIGH (ref 70–99)

## 2021-06-02 PROCEDURE — 88307 TISSUE EXAM BY PATHOLOGIST: CPT | Mod: 26

## 2021-06-02 PROCEDURE — 54505 BIOPSY OF TESTIS: CPT | Mod: 50,22

## 2021-06-02 RX ORDER — AZTREONAM 2 G
1 VIAL (EA) INJECTION
Qty: 0 | Refills: 0 | DISCHARGE

## 2021-06-02 RX ORDER — CELECOXIB 200 MG/1
1 CAPSULE ORAL
Qty: 9 | Refills: 0
Start: 2021-06-02 | End: 2021-06-04

## 2021-06-02 RX ORDER — DORNASE ALFA 1 MG/ML
1 SOLUTION RESPIRATORY (INHALATION)
Qty: 0 | Refills: 0 | DISCHARGE

## 2021-06-02 RX ORDER — LIPASE/PROTEASE/AMYLASE 16-48-48K
0 CAPSULE,DELAYED RELEASE (ENTERIC COATED) ORAL
Qty: 0 | Refills: 0 | DISCHARGE

## 2021-06-02 RX ORDER — ALBUTEROL 90 UG/1
3 AEROSOL, METERED ORAL
Qty: 0 | Refills: 0 | DISCHARGE

## 2021-06-02 RX ORDER — FLUTICASONE PROPIONATE AND SALMETEROL 50; 250 UG/1; UG/1
1 POWDER ORAL; RESPIRATORY (INHALATION)
Qty: 0 | Refills: 0 | DISCHARGE

## 2021-06-02 NOTE — ASU DISCHARGE PLAN (ADULT/PEDIATRIC) - CALL YOUR DOCTOR IF YOU HAVE ANY OF THE FOLLOWING:
Bleeding that does not stop/Swelling that gets worse/Pain not relieved by Medications/Fever greater than (need to indicate Fahrenheit or Celsius)/Wound/Surgical Site with redness, or foul smelling discharge or pus/Nausea and vomiting that does not stop Bleeding that does not stop/Swelling that gets worse/Pain not relieved by Medications/Fever greater than (need to indicate Fahrenheit or Celsius)/Wound/Surgical Site with redness, or foul smelling discharge or pus/Nausea and vomiting that does not stop/Inability to tolerate liquids or foods

## 2021-06-02 NOTE — ASU DISCHARGE PLAN (ADULT/PEDIATRIC) - ASU DC SPECIAL INSTRUCTIONSFT
You may resume your home medications.  Please take celebrex for pain every 8 hours, as needed. If this is not sufficient, please call your urologist.  You may notice swelling and bruising of your scrotum, this is normal and will resolve. However if this continues to worsen or is associated with significant pain and discoloration, please contact your urologist.  You may remove the dressing in 48 hours and shower. Do not submerge incision in water/bath tub until after you are seen for you post-operative appointment.  Please call your urologist's office today or tomorrow to set up a follow up appointment.

## 2021-06-02 NOTE — ASU DISCHARGE PLAN (ADULT/PEDIATRIC) - CARE PROVIDER_API CALL
Jah Josue)  Urology  [address] [place] [address], [address] [id number]  Phone: [phone]  Fax: [phone]  Follow Up Time:

## 2021-06-04 ENCOUNTER — APPOINTMENT (OUTPATIENT)
Dept: PULMONOLOGY | Facility: CLINIC | Age: 31
End: 2021-06-04

## 2021-06-04 VITALS
SYSTOLIC BLOOD PRESSURE: 126 MMHG | WEIGHT: 134 LBS | HEIGHT: 65 IN | HEART RATE: 78 BPM | DIASTOLIC BLOOD PRESSURE: 74 MMHG | RESPIRATION RATE: 17 BRPM | OXYGEN SATURATION: 97 % | BODY MASS INDEX: 22.33 KG/M2 | TEMPERATURE: 97.5 F

## 2021-06-05 LAB — SARS-COV-2 N GENE NPH QL NAA+PROBE: NOT DETECTED

## 2021-06-09 LAB — SURGICAL PATHOLOGY STUDY: SIGNIFICANT CHANGE UP

## 2021-06-10 ENCOUNTER — NON-APPOINTMENT (OUTPATIENT)
Age: 31
End: 2021-06-10

## 2021-06-10 LAB — BACTERIA SPT CF RESP CULT: ABNORMAL

## 2021-06-16 ENCOUNTER — NON-APPOINTMENT (OUTPATIENT)
Age: 31
End: 2021-06-16

## 2021-06-19 ENCOUNTER — APPOINTMENT (OUTPATIENT)
Dept: DISASTER EMERGENCY | Facility: CLINIC | Age: 31
End: 2021-06-19

## 2021-06-20 LAB — SARS-COV-2 N GENE NPH QL NAA+PROBE: NOT DETECTED

## 2021-06-22 ENCOUNTER — APPOINTMENT (OUTPATIENT)
Dept: PULMONOLOGY | Facility: CLINIC | Age: 31
End: 2021-06-22

## 2021-06-22 ENCOUNTER — APPOINTMENT (OUTPATIENT)
Dept: PULMONOLOGY | Facility: CLINIC | Age: 31
End: 2021-06-22
Payer: COMMERCIAL

## 2021-06-22 ENCOUNTER — LABORATORY RESULT (OUTPATIENT)
Age: 31
End: 2021-06-22

## 2021-06-22 VITALS
OXYGEN SATURATION: 99 % | HEART RATE: 76 BPM | RESPIRATION RATE: 16 BRPM | SYSTOLIC BLOOD PRESSURE: 125 MMHG | DIASTOLIC BLOOD PRESSURE: 78 MMHG | BODY MASS INDEX: 22.33 KG/M2 | TEMPERATURE: 98.2 F | WEIGHT: 134 LBS | HEIGHT: 65 IN

## 2021-06-22 DIAGNOSIS — E84.9 CYSTIC FIBROSIS, UNSPECIFIED: ICD-10-CM

## 2021-06-22 PROCEDURE — 99215 OFFICE O/P EST HI 40 MIN: CPT

## 2021-06-22 RX ORDER — NYSTATIN 100000 U/G
100000 OINTMENT TOPICAL 3 TIMES DAILY
Qty: 1 | Refills: 0 | Status: DISCONTINUED | COMMUNITY
Start: 2021-05-24 | End: 2021-06-22

## 2021-06-22 RX ORDER — SULFAMETHOXAZOLE AND TRIMETHOPRIM 800; 160 MG/1; MG/1
800-160 TABLET ORAL
Qty: 42 | Refills: 0 | Status: DISCONTINUED | COMMUNITY
Start: 2017-02-08 | End: 2021-06-22

## 2021-06-22 RX ORDER — NYSTATIN 100000 [USP'U]/ML
100000 SUSPENSION ORAL 3 TIMES DAILY
Qty: 150 | Refills: 2 | Status: DISCONTINUED | COMMUNITY
Start: 2021-05-24 | End: 2021-06-22

## 2021-06-22 RX ORDER — LINEZOLID 600 MG/1
600 TABLET, FILM COATED ORAL
Qty: 28 | Refills: 0 | Status: DISCONTINUED | COMMUNITY
Start: 2021-05-14 | End: 2021-06-22

## 2021-06-22 NOTE — ASSESSMENT
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 Pt also has chronic sinusitis and PI. 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 \par \par Patient seen today back on study drug with Noticeable improvement in respiratory symptoms. Improvement in Fev1 to 93%. Prior baseline on study drug was 84-79%.\par \par Subject completed [id number] on 11/13/19.\par Pt had discontinued study drug on 9/21/2020-10/7/20. And resumed on 10/8/20. Now off again since 2/23/21 and restarted 6/4/21. Re-consented 6/22/21\par Research enrolled in [id number] - Pt would like to continue to participate in the study at this time and feels greater benefit while being on study drug. Interested in  and substudy for Sweat Chloride home testing.\par - discussed risk and benefits for patient and potential fetus of being on modulator during sperm extraction \par \par ACT: albuterol > HS 3% >Pulmozyme, Using Aerobika less than daily\par - Fev1 93%, 76% (home aisha 6/1/21), 59% (5/25/21), 78% (3/30/21 off study drug), 84% (1/8/20), 78% (10/16/20),  81% (7/21/20), 79% (1/13/20), 81% (12/11/19), 81% (11/26/19), 81% (11/13/19), 84% (6/2019)\par - REINFORCED AGAIN TODAY the importance of pt performing ACT daily and up to TID at minimum twice a day\par - Cont Advair \par - Discussed in depth with pt the importance of him performing his maintenance medications and airway clearance.\par - significant improvement in lung function attributable to several factors including study drug, inhaled antibiotics exercising more, and oral antibiotics. \par - Send home aisha report today and in 6 weeks. \par \par ID: Chronic MRSA and Burkholderia multivor. \par - Discussed targeting MRSA as likely causative agent for exacerbations pt amenable to nebulized antibiotics MRSA greater quantity on recent cultures. Burkholderia (S) to [place] can consider if needed. \par Tolerated Bethkis and tobramycin in past, did not tolerate TOBIpod.\par - Tolerated inhaled Vancomycin (pt says itchy throat was mild) no side effects during recent administration \par - Tolerated [place] and Vanco inh insurance will not cover\par - Advised annual audiometry testing \par \par CXR:10/10/20 Reticular opacities and bronchiectasis slight improvement in L mid to lower lung periphery but other wise no significant internal change \par \par CFRD: Poor compliance/adherence with Freestyle yonathan and consistent insulin. On 1-2 Units novolog with meals. Pending Omnipod insulin pump but high out of pocket cost. \par - f/u with Dr Salazar as recommended \par - Cont insulin regimen and monitoring FSBGs\par \par Nutrition: Using Creon as ordered 5-6 with meals, 3-4 with snacks, no issue.  No GI complaints today\par - f/u rd\par \par Sleep-DIS using Ambien 5mg 2x/week and benefitting. Endorsing 15-30 minutes with Ambien to initiate sleep. TST 5-6hours\par \par ENT: Hasn't f/u with ENT, or repeated CT sinus. Sinus rinses and Flonase PRN \par \par Bone Density 11/28/16' normal T score-1.3\par - repeat in 2021 given RX AGAIN \par \par Urology/Conception: no Vas Deferens. Not actively trying to conceive still using condoms and protection. Wife had genetic testing done.Sperm extraction 6/2/21\par - Discussed importance of continuing to use barrier protection even once wife is pregnant. \par \par Health Maintenance:\par Annual BW  today\par Flu vaccine 10/2020 left im delt\par Pneumococcal 23 to be administered in 2020\par DEXA due 2021 has rx\par CXR 10/ 2020 \par Agreeable to COVID19 vaccine after his procedure \par \par CARDIO Echo wnl mild mitral regurg\par \par Pt participated in Sera trials \par \par f/u 3 months

## 2021-06-22 NOTE — REASON FOR VISIT
[Follow-Up] : a follow-up visit [Home] : at home, [unfilled] , at the time of the visit. [Medical Office: ([facility])___] : at the medical office located in  [Verbal consent obtained from patient] : the patient, [unfilled] [FreeTextEntry1] : CF

## 2021-06-22 NOTE — PHYSICAL EXAM
[General Appearance - Well Developed] : well developed [Well Groomed] : well groomed [General Appearance - Well Nourished] : well nourished [General Appearance - In No Acute Distress] : no acute distress [Normal Conjunctiva] : the conjunctiva exhibited no abnormalities [Normal Oral Mucosa] : normal oral mucosa [Normal Oropharynx] : normal oropharynx [Neck Appearance] : the appearance of the neck was normal [Apical Impulse] : the apical impulse was normal [Heart Rate And Rhythm] : heart rate was normal and rhythm regular [Heart Sounds] : normal S1 and S2 [Murmurs] : no murmurs [Respiration, Rhythm And Depth] : normal respiratory rhythm and effort [Exaggerated Use Of Accessory Muscles For Inspiration] : no accessory muscle use [Bowel Sounds] : normal bowel sounds [Abdomen Soft] : soft [Abdomen Tenderness] : non-tender [Abnormal Walk] : normal gait [Musculoskeletal - Swelling] : no joint swelling seen [Skin Color & Pigmentation] : normal skin color and pigmentation [] : no rash [No Focal Deficits] : no focal deficits [Oriented To Time, Place, And Person] : oriented to person, place, and time [Impaired Insight] : insight and judgment were intact [Affect] : the affect was normal [FreeTextEntry1] : + digital clubbing

## 2021-06-22 NOTE — END OF VISIT
[Time Spent: ___ minutes] : I have spent [unfilled] minutes of time on the encounter. [FreeTextEntry3] : I agree with the advanced clinical provider's history, physical examination and plan of care. I personally elicited a history and examined the patient. See above attestation. 45 minutes time spent for patient education related to comorbidities and medications, medical records/labs/radiology reviews, preventative care, documentation.\par \par \par

## 2021-06-22 NOTE — HISTORY OF PRESENT ILLNESS
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 [Pseudomonas] : Pseudomonas [Other: ___] : [unfilled] [___] : the last positive Pseudomonas result was [unfilled] [Last AFB Date: ___] : the AFB was performed  [unfilled] [None] : ~He/She~ has no hemoptysis [FVC ___] : the FVC was [unfilled] liters [FEV1: ___] : the FEV1 was [unfilled] liters [] : vest daily [Other ___] : exercise [unfilled] [Fair] : fair [Pancreatic Insufficiency] : pancreatic insufficiency [Pancreatic Enzyme Supp.] : uses pancreatic enzyme supplements [CFRD] : CFRD [Date: ___] : The last HgA1C was performed on [unfilled] [HgA1C Value: ___] : HgA1C value was [unfilled]  [AFB] : the patient had a MAC negative AFB [Headache] : no headache [Congestion] : no nasal congestion [Sinus Pain] : no sinus pain [Pancreatitis] : no pancreatitis [Diarrhea] : no diarrhea [Constipation] : no constipation [Steatorrhea] : no steatorrhea [de-identified] : 29 y/o male CF pt who was dx'ed CF at 6 mo old, + sweat, + 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 Pt also has chronic sinusitis and PI.\par \par Subject completed [id number].\par Pt has been back on study drug for 2.5 weeks restarted 6/4/21. Healing well from sperm extraction. Felt like he had a cold on Saturday lasted about 12 hours more likely allergies was sneezing, watery eyes, pnd, runny nose took claritin and it resolved. Feeling much better on study drug almost back to his baseline prior to stopping cough is less frequent and sputum is thinner. No fevers, sob, wheeze, hemoptysis, chest tightness, constipation, sinus congestion, swelling, sore throat, eye pain, ear pain. \par \par Subject completed [id number] on 11/13/19. \par Research [id number]. Pt reports no adverse events or side effects from current study drug. Subject had lapse in treatment and was off of study drug from 9/21/2020-10/7/2020. Resumed triple therapy on 10/8/20. Stopped on 2/23/21 restarted 6/4/21\par \par PULM: ACT alb/HS/pulm with Vest/Aerobika. 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 \par Advair \par \par ENT: No Tinnitus, sore throat, no acute sinus sxs. Thrush resolved. \par \par ENDO: Bone Density 11/28/16' normal T score-1.3, CFRD supposed to be on insulin pending omnipod still. Saw Dr. Salazar FSBGs avg 150s, 2 hypos in last month <60 resolves with glucose. On Novolog 1-2Units with meals. \par \par Urology/Conception: no Vas Deferens Wife had genetic testing done. Sperm Extraction 6/2/21 with urologist Dr. Jah Josue\par \par Nutrition: Using Creon as ordered, no issue.  No GI complaints today. 1-2 BMs daily. Good appetite weight has been stable\par \par Sleep-DIS using Ambien 5mg < weekly. Endorsing 15-30 minutes with Ambien to initiate sleep. Poor sleep 5-6 hours a night\par \par Cardio: biphasic P waves on EKG, wnl ECHO 2/2020 mild mitral regurg\par \par Social:  2019, not eligible for [employer] due to income. [employer], Attributor.

## 2021-06-25 LAB
25(OH)D3 SERPL-MCNC: 30.3 NG/ML
APPEARANCE: ABNORMAL
APTT BLD: 31.1 SEC
BILIRUBIN URINE: NEGATIVE
BLOOD URINE: NEGATIVE
CHOLEST SERPL-MCNC: 137 MG/DL
COLOR: YELLOW
CREAT SPEC-SCNC: 210 MG/DL
ESTIMATED AVERAGE GLUCOSE: 186 MG/DL
GLUCOSE QUALITATIVE U: NEGATIVE
HBA1C MFR BLD HPLC: 8.1 %
HDLC SERPL-MCNC: 48 MG/DL
INR PPP: 1.06 RATIO
KETONES URINE: NEGATIVE
LDLC SERPL CALC-MCNC: 81 MG/DL
LEUKOCYTE ESTERASE URINE: NEGATIVE
MICROALBUMIN 24H UR DL<=1MG/L-MCNC: <1.2 MG/DL
MICROALBUMIN/CREAT 24H UR-RTO: NORMAL MG/G
NITRITE URINE: NEGATIVE
NONHDLC SERPL-MCNC: 90 MG/DL
PH URINE: 5.5
PROTEIN URINE: NEGATIVE
PT BLD: 12.5 SEC
SPECIFIC GRAVITY URINE: 1.03
TRIGL SERPL-MCNC: 44 MG/DL
UROBILINOGEN URINE: NORMAL

## 2021-07-02 LAB
A FLAVUS AB FLD QL: NEGATIVE
A FUMIGATUS AB FLD QL: NEGATIVE
A NIGER AB FLD QL: NEGATIVE
A-TOCOPHEROL VIT E SERPL-MCNC: 4.8 MG/L
BETA+GAMMA TOCOPHEROL SERPL-MCNC: 1.5 MG/L
TOTAL IGE SMQN RAST: 21 KU/L
VIT A SERPL-MCNC: 27 UG/DL

## 2021-07-06 LAB
BACTERIA SPT CF RESP CULT: ABNORMAL
MENADIONE SERPL-MCNC: 0.11 NG/ML

## 2021-07-08 ENCOUNTER — NON-APPOINTMENT (OUTPATIENT)
Age: 31
End: 2021-07-08

## 2021-07-27 ENCOUNTER — NON-APPOINTMENT (OUTPATIENT)
Age: 31
End: 2021-07-27

## 2021-08-12 ENCOUNTER — NON-APPOINTMENT (OUTPATIENT)
Age: 31
End: 2021-08-12

## 2021-08-16 ENCOUNTER — APPOINTMENT (OUTPATIENT)
Dept: ENDOCRINOLOGY | Facility: CLINIC | Age: 31
End: 2021-08-16
Payer: COMMERCIAL

## 2021-08-16 VITALS
HEIGHT: 65 IN | HEART RATE: 81 BPM | BODY MASS INDEX: 22.66 KG/M2 | DIASTOLIC BLOOD PRESSURE: 74 MMHG | OXYGEN SATURATION: 99 % | TEMPERATURE: 97.7 F | SYSTOLIC BLOOD PRESSURE: 120 MMHG | WEIGHT: 136 LBS

## 2021-08-16 PROCEDURE — 99214 OFFICE O/P EST MOD 30 MIN: CPT | Mod: GC

## 2021-08-16 NOTE — PHYSICAL EXAM
[Alert] : alert [Well Nourished] : well nourished [No Acute Distress] : no acute distress [Well Developed] : well developed [Normal Sclera/Conjunctiva] : normal sclera/conjunctiva [EOMI] : extra ocular movement intact [No Proptosis] : no proptosis [Normal Oropharynx] : the oropharynx was normal [Thyroid Not Enlarged] : the thyroid was not enlarged [No Thyroid Nodules] : no palpable thyroid nodules [No Respiratory Distress] : no respiratory distress [No Accessory Muscle Use] : no accessory muscle use [Clear to Auscultation] : lungs were clear to auscultation bilaterally [Normal S1, S2] : normal S1 and S2 [Normal Rate] : heart rate was normal [Regular Rhythm] : with a regular rhythm [No Edema] : no peripheral edema [Pedal Pulses Normal] : the pedal pulses are present [Normal Bowel Sounds] : normal bowel sounds [Not Tender] : non-tender [Not Distended] : not distended [Soft] : abdomen soft [Normal Anterior Cervical Nodes] : no anterior cervical lymphadenopathy [No Spinal Tenderness] : no spinal tenderness [Spine Straight] : spine straight [No Stigmata of Cushings Syndrome] : no stigmata of Cushings Syndrome [Normal Gait] : normal gait [Normal Strength/Tone] : muscle strength and tone were normal [No Rash] : no rash [Normal Reflexes] : deep tendon reflexes were 2+ and symmetric [No Tremors] : no tremors [Oriented x3] : oriented to person, place, and time [Normal Voice/Communication] : normal voice communication [Normal Hearing] : hearing was normal [Supple] : the neck was supple [Normal Insight/Judgement] : insight and judgment were intact [Acanthosis Nigricans] : no acanthosis nigricans

## 2021-08-16 NOTE — ASSESSMENT
[FreeTextEntry1] : Mr. SOCORRO STEPHEN is 31 year old male here for evaluation of CFRD, on meal time insulin only \par \par 1.  Cystic fibrosis related diabetes mellitus\par A1C 8.1% in 6/2021\par Recent hyperglycemia, possibly due to having been off Trikafta between 2/21-4/21 for fertility reasons, recently restarted on the medication\par - Continue with Novolog 4-6 units TIDAC/Premeal\par - Continue utilizing the freestyle yonathan CGM sensor - data downloaded in office today: Device active 41% of the time. BG levels in target range 74% of the time; Very high 7%, high 16%, low 2% and very low 1%. \par - Patient is interested to find out about Omni pod system and will likely benefit from this as it can do 0 basal units on the system.  He really just needs mealtime coverage.  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  \par - Once patient obtains Omni pod system, he will need an appointment with our certified diabetes educator for training.  We will set for basal of 0.0 units, (or lowest dosage every other hour)  manual bolus of 2 units with meals.  Will set up a correction factor of 1: 100 above 200 mg/dL.  Active insulin time 4 hours.  Glucose target 150\par We will look into Omnipod for this patient.  \par - Recommend patient to follow-up with ophthalmologist. last seen within the past year, no issues per pt. \par - No evidence of DM nephropathy, microalbumin undetectable in 6/2021\par \par 2.  Vitamin D deficiency\par Vitamin D in June 2019 was 27.9.  Recommend to bring the level above 30 ng/mm for bone health.  Continue with supplementations.\par Most recent level was Aug 2020, level was 33.8 ng/mL. \par \par 3.  Osteopenia\par Last DEXA in 2016\par Has requisition for repeat DEXA scan this year per pt, from PMD\par - Advised to have repeat DEXA scan done asap for monitoring of BMD\par \par RTC in 4 months. \par Discussed with Dr. Salazar. \par

## 2021-08-16 NOTE — END OF VISIT
[] : Fellow [FreeTextEntry3] : Patient is a 31-year-old man with history of cystic fibrosis related diabetes mellitus.  He was treated with elevated glucose since the age of 17.  Patient is currently on NovoLog 4 to 6 units 3 times daily with meals.  He does not require basal insulin at this time as his fasting glucose has been within targets.  Patient admits to missing NovoLog dosing about 4 times a week.  Resulting in postprandial hyperglycemia.  Patient is interested in starting insulin pump with the OmniPod.  Previously he had not met the deductible but with the recent procedure in June 2021, he had met all his medical deductible.  I will sign a task to our RN/CDE  to follow-up on the insulin pump status.  He goes to [facility] pharmacy. \par For now recommend patient to resume NovoLog 46 units 3 times daily with meals depending on the size of the meal.\par Follow-up in 4 months.

## 2021-08-16 NOTE — HISTORY OF PRESENT ILLNESS
[Continuous Glucose Monitoring] : Continuous Glucose Monitoring: Yes [Joshua] : Joshua [FreeTextEntry1] : 31-year-old male with history of cystic fibrosis diagnosed at age 6 months, with positive sweat, positive gene for delta 508 and 3905insT. patient also with a history of chronic sinusitis presenting to office for follow up of Cystic Fibrosis Related Diabetes (Type 3c). \par \par Last seen in office in 2/2021. Established care with me in March 2019.  \par Still having trouble obtaining omnipod. Pharmacy & insurance company issues regarding deductible. Recently med deductible 1 month prior but has not been able to obtain the device. Currently only on bolus insulin regimen and feels he would benefit from omnipod as he often forgets to administer bolus injections as he works outside during the day. \par \par He currently utilizes a freestyle yonathan sensor. Having some mild skin sensitivity issues w/ adhesive. Free stye yonathan CGM data downloaded in office today: Device active 41% of the time. BG levels in target range 74% of the time; Very high 7%, high 16%, low 2% and very low 1%. Has noticed that his sugars have been running higher and so increased his Novolog from 2 units TIDAC to 4 to 6 units TIDAC depending on meal size/carb amount. Misses approximately 4 premeal injections per week. \par A1c 8.1% in 6/2021\par Undetectable microalbumin \par LDL 81\par \par Patient reported that he was diagnosed with elevated glucose since age 17.  He was treated with mealtime insulin only.  Currently he is only taking NovoLog 2 units with meal.  He reported that he had not been taking the insulin for a long time.  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  He has been out and about most of the day.  Therefore often forgetting to bring his insulin NovoLog pen with him.  He is interested to find out if there is an insulin pump that is available for him which will make his adherence to insulin therapy better.  He is also under research protocol for CF medication Trikafta. Was off the medication briefly in February, March, April for fertility reasons & went back on June 2021. Recent hyperglycemia also possibly related to having been off Trikafta briefly? \par \par Regarding bones density, was diagnosed with osteopenia with a T score of -1.3, bone density was last done in November 26, 2016. Patient is using Creon as ordered.  No GI issues. Was advised to have repeat BMD DEXA scan done this year by PMD & states that he has the requisition form, planning to make appt soon. Advised to do so. \par \par No other concerns or complaints expressed by patient.  [FreeTextEntry2] : 57 [FreeTextEntry3] : 33 [FreeTextEntry4] : 0

## 2021-08-17 ENCOUNTER — NON-APPOINTMENT (OUTPATIENT)
Age: 31
End: 2021-08-17

## 2021-08-18 ENCOUNTER — LABORATORY RESULT (OUTPATIENT)
Age: 31
End: 2021-08-18

## 2021-08-20 ENCOUNTER — NON-APPOINTMENT (OUTPATIENT)
Age: 31
End: 2021-08-20

## 2021-08-26 ENCOUNTER — APPOINTMENT (OUTPATIENT)
Dept: INFECTIOUS DISEASE | Facility: CLINIC | Age: 31
End: 2021-08-26
Payer: COMMERCIAL

## 2021-08-26 VITALS
SYSTOLIC BLOOD PRESSURE: 130 MMHG | WEIGHT: 137 LBS | OXYGEN SATURATION: 99 % | HEIGHT: 65 IN | TEMPERATURE: 97.6 F | HEART RATE: 70 BPM | DIASTOLIC BLOOD PRESSURE: 78 MMHG | BODY MASS INDEX: 22.82 KG/M2

## 2021-08-26 DIAGNOSIS — Z83.3 FAMILY HISTORY OF DIABETES MELLITUS: ICD-10-CM

## 2021-08-26 DIAGNOSIS — A53.0 LATENT SYPHILIS, UNSPECIFIED AS EARLY OR LATE: ICD-10-CM

## 2021-08-26 DIAGNOSIS — J47.9 BRONCHIECTASIS, UNCOMPLICATED: ICD-10-CM

## 2021-08-26 PROCEDURE — 99203 OFFICE O/P NEW LOW 30 MIN: CPT

## 2021-08-26 NOTE — PHYSICAL EXAM
[General Appearance - Alert] : alert [General Appearance - In No Acute Distress] : in no acute distress [Sclera] : the sclera and conjunctiva were normal [Oropharynx] : the oropharynx was normal with no thrush [Neck Appearance] : the appearance of the neck was normal [] : no respiratory distress [Auscultation Breath Sounds / Voice Sounds] : lungs were clear to auscultation bilaterally [Heart Sounds] : normal S1 and S2 [Edema] : there was no peripheral edema [Bowel Sounds] : normal bowel sounds [Abdomen Soft] : soft [Abdomen Tenderness] : non-tender [Costovertebral Angle Tenderness] : no CVA tenderness [No Palpable Adenopathy] : no palpable adenopathy [Musculoskeletal - Swelling] : no joint swelling [No Focal Deficits] : no focal deficits [Affect] : the affect was normal

## 2021-08-26 NOTE — ASSESSMENT
[FreeTextEntry1] : 31 m with cystic fibrosis and DM had tests done at a fertility clinic and syphilis IgG was positive so he was sent here, He is  and monogamous and was never diagnosed or treated for syphilis before\par HIV, GC and hep C were negative\par \par positive syphilis serology outside\par \par * it was resulted as syphilis IgG, not sure if it was false\par * will check RPR and syphilis screen \par *  if it is suggestive of syphilis then will treat with Bicillin

## 2021-08-26 NOTE — HISTORY OF PRESENT ILLNESS
[FreeTextEntry1] : 31 m with cystic fibrosis and DM had tests done at a fertility clinic and syphilis IgG was positive so he was sent here, He is  and monogamous and was never diagnosed or treated for syphilis before\par HIV, GC and hep C were negative

## 2021-08-26 NOTE — REVIEW OF SYSTEMS
[Fever] : no fever [Chills] : no chills [Eye Pain] : no eye pain [Red Eyes] : eyes not red [Earache] : no earache [Loss Of Hearing] : no hearing loss [Chest Pain] : no chest pain [Palpitations] : no palpitations [Shortness Of Breath] : no shortness of breath [Abdominal Pain] : no abdominal pain [Vomiting] : no vomiting [Dysuria] : no dysuria [Joint Pain] : no joint pain [Skin Lesions] : no skin lesions [Confused] : no confusion [Convulsions] : no convulsions [Suicidal] : not suicidal [Easy Bleeding] : no tendency for easy bleeding [Easy Bruising] : no tendency for easy bruising [Negative] : Heme/Lymph

## 2021-08-30 ENCOUNTER — NON-APPOINTMENT (OUTPATIENT)
Age: 31
End: 2021-08-30

## 2021-08-30 LAB
RPR SER-TITR: NORMAL
T PALLIDUM AB SER QL IA: NEGATIVE

## 2021-09-01 ENCOUNTER — APPOINTMENT (OUTPATIENT)
Dept: HUMAN REPRODUCTION | Facility: CLINIC | Age: 31
End: 2021-09-01

## 2021-09-18 ENCOUNTER — APPOINTMENT (OUTPATIENT)
Dept: DISASTER EMERGENCY | Facility: CLINIC | Age: 31
End: 2021-09-18

## 2021-09-19 LAB — SARS-COV-2 N GENE NPH QL NAA+PROBE: NOT DETECTED

## 2021-09-20 ENCOUNTER — APPOINTMENT (OUTPATIENT)
Dept: PULMONOLOGY | Facility: CLINIC | Age: 31
End: 2021-09-20

## 2021-09-20 ENCOUNTER — MED ADMIN CHARGE (OUTPATIENT)
Age: 31
End: 2021-09-20

## 2021-09-20 ENCOUNTER — APPOINTMENT (OUTPATIENT)
Dept: PULMONOLOGY | Facility: CLINIC | Age: 31
End: 2021-09-20
Payer: COMMERCIAL

## 2021-09-20 ENCOUNTER — APPOINTMENT (OUTPATIENT)
Dept: ENDOCRINOLOGY | Facility: CLINIC | Age: 31
End: 2021-09-20
Payer: COMMERCIAL

## 2021-09-20 ENCOUNTER — NON-APPOINTMENT (OUTPATIENT)
Age: 31
End: 2021-09-20

## 2021-09-20 VITALS
SYSTOLIC BLOOD PRESSURE: 134 MMHG | HEIGHT: 65 IN | DIASTOLIC BLOOD PRESSURE: 76 MMHG | BODY MASS INDEX: 23.32 KG/M2 | HEART RATE: 76 BPM | TEMPERATURE: 98.2 F | OXYGEN SATURATION: 98 % | WEIGHT: 140 LBS | RESPIRATION RATE: 16 BRPM

## 2021-09-20 DIAGNOSIS — R19.7 DIARRHEA, UNSPECIFIED: ICD-10-CM

## 2021-09-20 PROCEDURE — 99215 OFFICE O/P EST HI 40 MIN: CPT

## 2021-09-20 PROCEDURE — P0004: CPT

## 2021-09-20 RX ORDER — VANCOMYCIN HYDROCHLORIDE 500 MG/10ML
500 INJECTION, POWDER, LYOPHILIZED, FOR SOLUTION INTRAVENOUS
Qty: 30 | Refills: 2 | Status: DISCONTINUED | COMMUNITY
Start: 2020-10-16 | End: 2021-09-20

## 2021-09-20 RX ORDER — INSULIN ASPART 100 [IU]/ML
100 INJECTION, SOLUTION INTRAVENOUS; SUBCUTANEOUS
Qty: 3 | Refills: 3 | Status: ACTIVE | COMMUNITY
Start: 2021-09-20

## 2021-09-20 RX ORDER — AZITHROMYCIN 250 MG/1
250 TABLET, FILM COATED ORAL
Qty: 1 | Refills: 0 | Status: DISCONTINUED | COMMUNITY
Start: 2021-08-18 | End: 2021-09-20

## 2021-09-21 ENCOUNTER — NON-APPOINTMENT (OUTPATIENT)
Age: 31
End: 2021-09-21

## 2021-09-21 NOTE — PHYSICAL EXAM
[General Appearance - Well Developed] : well developed [Well Groomed] : well groomed [General Appearance - Well Nourished] : well nourished [General Appearance - In No Acute Distress] : no acute distress [Normal Conjunctiva] : the conjunctiva exhibited no abnormalities [Normal Oral Mucosa] : normal oral mucosa [Normal Oropharynx] : normal oropharynx [Neck Appearance] : the appearance of the neck was normal [Apical Impulse] : the apical impulse was normal [Heart Rate And Rhythm] : heart rate was normal and rhythm regular [Heart Sounds] : normal S1 and S2 [Murmurs] : no murmurs [Respiration, Rhythm And Depth] : normal respiratory rhythm and effort [Exaggerated Use Of Accessory Muscles For Inspiration] : no accessory muscle use [Abdomen Soft] : soft [Bowel Sounds] : normal bowel sounds [Abdomen Tenderness] : non-tender [Abnormal Walk] : normal gait [Musculoskeletal - Swelling] : no joint swelling seen [Skin Color & Pigmentation] : normal skin color and pigmentation [] : no rash [No Focal Deficits] : no focal deficits [Oriented To Time, Place, And Person] : oriented to person, place, and time [Impaired Insight] : insight and judgment were intact [Affect] : the affect was normal [FreeTextEntry1] : + digital clubbing

## 2021-09-21 NOTE — END OF VISIT
[FreeTextEntry3] : I agree with the advanced clinical provider's history, physical examination and plan of care. 45 minutes time spent for patient education related to comorbidities and medications, medical records/labs/radiology reviews, preventative care, documentation.\par \par \par \par  [Time Spent: ___ minutes] : I have spent [unfilled] minutes of time on the encounter.

## 2021-09-21 NOTE — HISTORY OF PRESENT ILLNESS
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 [Pseudomonas] : Pseudomonas [Other: ___] : [unfilled] [___] : the last positive Pseudomonas result was [unfilled] [Last AFB Date: ___] : the AFB was performed  [unfilled] [None] : ~He/She~ has no hemoptysis [FVC ___] : the FVC was [unfilled] liters [FVC ___] : the FVC was [unfilled] liters [FEV1: ___] : the FEV1 was [unfilled] liters [] : vest daily [Other ___] : exercise [unfilled] [Fair] : fair [Pancreatic Insufficiency] : pancreatic insufficiency [Pancreatic Enzyme Supp.] : uses pancreatic enzyme supplements [CFRD] : CFRD [Date: ___] : The last HgA1C was performed on [unfilled] [HgA1C Value: ___] : HgA1C value was [unfilled]  [AFB] : the patient had a MAC negative AFB [Headache] : no headache [Congestion] : no nasal congestion [Sinus Pain] : no sinus pain [Pancreatitis] : no pancreatitis [Diarrhea] : no diarrhea [Constipation] : no constipation [Steatorrhea] : no steatorrhea [de-identified] : 30 y/o male CF pt who was dx'ed CF at 6 mo old, + sweat, + 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 Pt also has chronic sinusitis and PI.\par \par Subject [id number]. Here today feels at respiratory baseline. Recently traveled to [place]. Had diarrhea in early/mid Augsut with negative stool culture and cdiff neg. took zpack and it resolved. Now having diarrhea again x 1 week. Watery 3-4x a day with urgency to go. Does not seem to be associated with meals. No fevers, sob, wheeze, hemoptysis, chest tightness, constipation, abd pain, n/v, sinus congestion, swelling, sore throat, eye pain, ear pain. Here today for Visit Week #96. Has been poorly compliant with nebs/ACT/inhalers has not done any in 3 weeks\par \par Subject completed [id number] on 11/13/19. \par Research [id number]. Pt reports no adverse events or side effects from current study drug. Subject had lapse in treatment and was off of study drug from 9/21/2020-10/7/2020. Resumed triple therapy on 10/8/20. Stopped on 2/23/21 restarted 6/4/21\par \par PULM: ACT alb/HS/pulm with Vest/Aerobika. 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 \par Advair PRN\par \par ENT: No Tinnitus, sore throat, no acute sinus sxs. Thrush resolved. \par \par ENDO: Bone Density 11/28/16' normal T score-1.3, CFRD supposed to be on insulin pending omnipod still. Saw Dr. Salazar FSBGs avg 150s, 2 hypos in last month <60 resolves with glucose. On Novolog 1-2Units with meals. \par \par Urology/Conception: no Vas Deferens Wife had genetic testing done. Sperm Extraction 6/2/21 with urologist Dr. Jah Josue. Pending IVF apt with wife. \par \par Nutrition: Using Creon as ordered, no issue. Baseline 1-2 BMs daily. Good appetite weight has been stable\par \par Sleep-DIS using Ambien 5mg < weekly. Endorsing 15-30 minutes with Ambien to initiate sleep. Poor sleep 5-6 hours a night\par \par Cardio: biphasic P waves on EKG, wnl ECHO 2/2020 mild mitral regurg\par \par Social:  2019, not eligible for [employer] due to income. [employer], [employer].

## 2021-09-21 NOTE — ASSESSMENT
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 Pt also has chronic sinusitis and PI. 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 \par \par Patient seen today week #96 and Clinic visit, last dose of study drug was yesterday will start commercial Trikafta later today. At respiratory baseline poor compliance with nebs/act. Diarrhea x 1 week. \par \par Subject completed [id number] on 11/13/19.\par Pt had discontinued study drug on 9/21/2020-10/7/20. And resumed on 10/8/20. Now off again since 2/23/21 and restarted 6/4/21. Re-consented 6/22/21\par Research enrolled in [id number] - Pt would like to continue to participate in the study at this time and feels greater benefit while being on study drug. Interested in  and substudy for Sweat Chloride home testing.\par - discussed risk and benefits for patient and potential fetus of being on modulator during sperm extraction \par \par ACT: albuterol > HS 3% >Pulmozyme, Using Aerobika  poor compliance.\par - Fev1 84% (9/20/21), 93%, 76% (home aisha 6/1/21), 59% (5/25/21), 78% (3/30/21 off study drug), 84% (1/8/20), 78% (10/16/20),  81% (7/21/20), 79% (1/13/20), 81% (12/11/19), 81% (11/26/19), 81% (11/13/19), 84% (6/2019)\par - REINFORCED AGAIN TODAY the importance of pt performing ACT daily and up to TID at minimum twice a day\par - Cont Advair \par - Discussed in depth with pt the importance of him performing his maintenance medications and airway clearance.\par - Suptum CS and AFB swabs \par - Interested in Volara would benefit to increase compliance will order \par \par ID: Chronic MRSA and Burkholderia multivor. \par - Discussed targeting MRSA as likely causative agent for exacerbations pt amenable to nebulized antibiotics MRSA greater quantity on recent cultures. Burkholderia (S) to [place] can consider if needed. \par Tolerated Bethkis and tobramycin in past, did not tolerate TOBIpod.\par - Tolerated inhaled Vancomycin (pt says itchy throat was mild) no side effects during recent administration \par - Tolerated [place] and Vanco inh insurance will not cover\par \par CXR:10/10/20 Reticular opacities and bronchiectasis slight improvement in L mid to lower lung periphery but other wise no significant internal change \par \par CFRD: Just started Omnipod this past week, educated by Endo.\par - f/u with Dr Salazar as recommended \par - Cont insulin regimen and monitoring FSBGs\par \par Nutrition/GI: Using Creon as ordered 5-6 with meals, 3-4 with snacks, no issue.  No GI complaints today\par - f/u rd\par - Diarrhea: stool Cx, cdiff, and OP gave pt supplies and scripts \par \par Sleep-DIS using Ambien 5mg 2x/week and benefitting. Endorsing 15-30 minutes with Ambien to initiate sleep. TST 5-6hours\par \par ENT: Hasn't f/u with ENT, or repeated CT sinus. Sinus rinses and Flonase PRN \par \par Bone Density 11/28/16' normal T score-1.3\par - repeat in 2021 given RX AGAIN \par \par Urology/Conception: no Vas Deferens. Not actively trying to conceive still using condoms and protection. Wife had genetic testing done.Sperm extraction 6/2/21\par - Discussed importance of continuing to use barrier protection even once wife is pregnant. \par \par Health Maintenance:\par Flu vaccine today left Im 9/20/21\par Pneumococcal 23 to be administered in 2020\par DEXA due 2021 has rx\par CXR 10/ 2020 \par Agreeable to COVID19 vaccine after his procedure \par COVID vacc x 2- 2021 \par CARDIO Echo wnl mild mitral regurg\par \par Pt participated in Sera trials \par Agrees to particpate in Pall Qi \par \par f/u 3 months

## 2021-09-27 ENCOUNTER — NON-APPOINTMENT (OUTPATIENT)
Age: 31
End: 2021-09-27

## 2021-09-27 LAB — BACTERIA SPT CF RESP CULT: ABNORMAL

## 2021-10-14 ENCOUNTER — APPOINTMENT (OUTPATIENT)
Dept: HUMAN REPRODUCTION | Facility: CLINIC | Age: 31
End: 2021-10-14
Payer: COMMERCIAL

## 2021-10-14 PROCEDURE — 36415 COLL VENOUS BLD VENIPUNCTURE: CPT

## 2021-11-10 LAB — RHODAMINE-AURAMINE STN SPEC: NORMAL

## 2021-12-27 ENCOUNTER — TRANSCRIPTION ENCOUNTER (OUTPATIENT)
Age: 31
End: 2021-12-27

## 2021-12-31 ENCOUNTER — LABORATORY RESULT (OUTPATIENT)
Age: 31
End: 2021-12-31

## 2022-01-01 ENCOUNTER — TRANSCRIPTION ENCOUNTER (OUTPATIENT)
Age: 32
End: 2022-01-01

## 2022-01-03 ENCOUNTER — NON-APPOINTMENT (OUTPATIENT)
Age: 32
End: 2022-01-03

## 2022-02-16 ENCOUNTER — RX RENEWAL (OUTPATIENT)
Age: 32
End: 2022-02-16

## 2022-02-24 RX ORDER — INSULIN PUMP CART,CONT INF,RF
CARTRIDGE (EA) SUBCUTANEOUS
Qty: 30 | Refills: 3 | Status: ACTIVE | COMMUNITY
Start: 2022-02-24 | End: 1900-01-01

## 2022-03-03 ENCOUNTER — NON-APPOINTMENT (OUTPATIENT)
Age: 32
End: 2022-03-03

## 2022-03-10 ENCOUNTER — NON-APPOINTMENT (OUTPATIENT)
Age: 32
End: 2022-03-10

## 2022-03-30 ENCOUNTER — RX RENEWAL (OUTPATIENT)
Age: 32
End: 2022-03-30

## 2022-05-10 ENCOUNTER — APPOINTMENT (OUTPATIENT)
Dept: PULMONOLOGY | Facility: CLINIC | Age: 32
End: 2022-05-10
Payer: COMMERCIAL

## 2022-05-10 ENCOUNTER — APPOINTMENT (OUTPATIENT)
Dept: PULMONOLOGY | Facility: CLINIC | Age: 32
End: 2022-05-10

## 2022-05-10 VITALS
HEIGHT: 65 IN | SYSTOLIC BLOOD PRESSURE: 120 MMHG | DIASTOLIC BLOOD PRESSURE: 71 MMHG | OXYGEN SATURATION: 99 % | RESPIRATION RATE: 16 BRPM | BODY MASS INDEX: 25.99 KG/M2 | TEMPERATURE: 97.7 F | WEIGHT: 156 LBS | HEART RATE: 86 BPM

## 2022-05-10 DIAGNOSIS — E84.0 CYSTIC FIBROSIS WITH PULMONARY MANIFESTATIONS: ICD-10-CM

## 2022-05-10 DIAGNOSIS — E55.9 VITAMIN D DEFICIENCY, UNSPECIFIED: ICD-10-CM

## 2022-05-10 DIAGNOSIS — U07.1 COVID-19: ICD-10-CM

## 2022-05-10 PROCEDURE — 99215 OFFICE O/P EST HI 40 MIN: CPT | Mod: 25

## 2022-05-10 PROCEDURE — 99417 PROLNG OP E/M EACH 15 MIN: CPT

## 2022-05-10 RX ORDER — FLUTICASONE PROPIONATE 50 UG/1
50 SPRAY, METERED NASAL
Qty: 1 | Refills: 3 | Status: DISCONTINUED | COMMUNITY
Start: 2019-11-13 | End: 2022-05-10

## 2022-05-10 RX ORDER — AZTREONAM 75 MG/ML
75 KIT INHALATION
Qty: 1 | Refills: 3 | Status: DISCONTINUED | COMMUNITY
Start: 2021-01-08 | End: 2022-05-10

## 2022-05-10 RX ORDER — ZOLPIDEM TARTRATE 5 MG/1
5 TABLET ORAL
Qty: 60 | Refills: 0 | Status: DISCONTINUED | COMMUNITY
Start: 2017-05-16 | End: 2022-05-10

## 2022-05-10 RX ORDER — SULFAMETHOXAZOLE AND TRIMETHOPRIM 800; 160 MG/1; MG/1
800-160 TABLET ORAL
Qty: 42 | Refills: 0 | Status: DISCONTINUED | COMMUNITY
Start: 2021-12-29 | End: 2022-05-10

## 2022-05-12 LAB
25(OH)D3 SERPL-MCNC: 22.2 NG/ML
CHOLEST SERPL-MCNC: 96 MG/DL
HDLC SERPL-MCNC: 22 MG/DL
LDLC SERPL CALC-MCNC: 60 MG/DL
NONHDLC SERPL-MCNC: 75 MG/DL
TOTAL IGE SMQN RAST: 17 KU/L
TRIGL SERPL-MCNC: 72 MG/DL

## 2022-05-13 NOTE — ASSESSMENT
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 Pt also has chronic sinusitis and PI. 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 \par \par PULM:  At respiratory baseline, does not produce any sputum, denies cough, excercises routinely. Pt self- stopped ACT, inhaled antibiotics, advair. \par \par Prior to trikafta, used to routinely take albuterol > HS 3% >Pulmozyme.\par - Fev1 84% (9/20/21), 93%, 76% (home aisha 6/1/21), 59% (5/25/21), 78% (3/30/21 off study drug), 84% (1/8/20), 78% (10/16/20),  81% (7/21/20), 79% (1/13/20), 81% (12/11/19), 81% (11/26/19), 81% (11/13/19), 84% (6/2019)\par - Suptum CS and AFB swabs done.\par \par CXR:10/10/20 Reticular opacities and bronchiectasis slight improvement in L mid to lower lung periphery but other wise no significant internal change \par \par 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 \par - Pt was on inhaled vanco/[place] prior to sperm extraction with good tolerance. Currently not on any.\par Tolerated Bethkis and tobramycin in past, did not tolerate TOBIpod.\par \par ENT: Denies sinus symptoms, not needing rinses or nasal sprays.\par \par CARDIO Echo wnl mild mitral regurg\par \par ENDO - CFRD: f/u with Endo, on insulin. Reports glucose levels of 150-200s post meals, but peaks after protein shakes, but patient exercises right after. \par Bone Density 11/28/16' normal T score-1.3\par - overdue 2021 given RX again today. \par \par Nutrition/GI: Using Creon as ordered 5-6 with meals, 3-4 with snacks, no issue.  No GI complaints today\par - f/u rd\par \par Sleep- Denies persistent insomnia, no longer needing Ambien.\par \par Urology/Conception: absence of Vas Deferens. Pt reports embryo fertilization and now awaiting genetic analysis.  \par \par Health Maintenance:\par Flu vaccine 9/20/21\par DEXA due \par CXR 10/ 2020, repeat due \par COVID19 vaccine completed 2-dose series.\par \par

## 2022-05-13 NOTE — ADDENDUM
[FreeTextEntry1] : 5/13/22-I discussed resulting labs with the patient.  AST 58, ALT 89, creatinine kinase 346.  LFT elevation is new.  Patient reports exercising heavily daily which could be related to CK elevation, may have had a glass of wine the night before labs drawn.  Otherwise patient has no concerning symptoms.  We will repeat LFTs, CK on 6/1/2022 research visit, will give patient prescription for liver ultrasound.  Patient is agreeable and understands plan.

## 2022-05-13 NOTE — PHYSICAL EXAM
[General Appearance - Well Developed] : well developed [Well Groomed] : well groomed [General Appearance - Well Nourished] : well nourished [General Appearance - In No Acute Distress] : no acute distress [Normal Conjunctiva] : the conjunctiva exhibited no abnormalities [Normal Oral Mucosa] : normal oral mucosa [Normal Oropharynx] : normal oropharynx [Neck Appearance] : the appearance of the neck was normal [Apical Impulse] : the apical impulse was normal [Heart Rate And Rhythm] : heart rate was normal and rhythm regular [Heart Sounds] : normal S1 and S2 [Murmurs] : no murmurs [Respiration, Rhythm And Depth] : normal respiratory rhythm and effort [Exaggerated Use Of Accessory Muscles For Inspiration] : no accessory muscle use [Bowel Sounds] : normal bowel sounds [Abdomen Soft] : soft [Abdomen Tenderness] : non-tender [Abnormal Walk] : normal gait [Musculoskeletal - Swelling] : no joint swelling seen [Skin Color & Pigmentation] : normal skin color and pigmentation [] : no rash [No Focal Deficits] : no focal deficits [Oriented To Time, Place, And Person] : oriented to person, place, and time [Impaired Insight] : insight and judgment were intact [Affect] : the affect was normal [Normal Appearance] : normal appearance [Auscultation Breath Sounds / Voice Sounds] : lungs were clear to auscultation bilaterally [FreeTextEntry1] : + digital clubbing

## 2022-05-13 NOTE — HISTORY OF PRESENT ILLNESS
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 [Pseudomonas] : Pseudomonas [Other: ___] : [unfilled] [___] : the last positive Pseudomonas result was [unfilled] [Last AFB Date: ___] : the AFB was performed  [unfilled] [None] : ~He/She~ has no hemoptysis [FVC ___] : the FVC was [unfilled] liters [FEV1: ___] : the FEV1 was [unfilled] liters [] : vest daily [Other ___] : exercise [unfilled] [Fair] : fair [Pancreatic Insufficiency] : pancreatic insufficiency [Pancreatic Enzyme Supp.] : uses pancreatic enzyme supplements [CFRD] : CFRD [Date: ___] : The last HgA1C was performed on [unfilled] [HgA1C Value: ___] : HgA1C value was [unfilled]  [___ Month(s) Ago] : [unfilled] month(s) ago [AFB] : the patient had a MAC negative AFB [Headache] : no headache [Congestion] : no nasal congestion [Sinus Pain] : no sinus pain [Pancreatitis] : no pancreatitis [Diarrhea] : no diarrhea [Constipation] : no constipation [Steatorrhea] : no steatorrhea [de-identified] : 32 y/o male CF pt who was dx'ed CF at 6 mo old, + sweat, + 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 Pt also has chronic sinusitis and PI. Hx of COVID-19 12/2021. \par Pt reports to be doing well, denies any symptoms. Doesn't remember last time he took airway clearance nebs/therapies, inhaled antibiotics - all self-discontinued. \par

## 2022-05-19 LAB
A FLAVUS AB FLD QL: NEGATIVE
A FUMIGATUS AB FLD QL: NEGATIVE
A NIGER AB FLD QL: NEGATIVE
A-TOCOPHEROL VIT E SERPL-MCNC: 4.1 MG/L
BACTERIA SPT CF RESP CULT: NORMAL
BETA+GAMMA TOCOPHEROL SERPL-MCNC: 0.7 MG/L
VIT A SERPL-MCNC: 34.4 UG/DL

## 2022-05-20 ENCOUNTER — NON-APPOINTMENT (OUTPATIENT)
Age: 32
End: 2022-05-20

## 2022-05-27 LAB — MENADIONE SERPL-MCNC: <0.1 NG/ML

## 2022-06-01 ENCOUNTER — APPOINTMENT (OUTPATIENT)
Dept: PULMONOLOGY | Facility: CLINIC | Age: 32
End: 2022-06-01

## 2022-06-01 VITALS
HEIGHT: 65 IN | BODY MASS INDEX: 24.66 KG/M2 | RESPIRATION RATE: 16 BRPM | HEART RATE: 65 BPM | SYSTOLIC BLOOD PRESSURE: 139 MMHG | WEIGHT: 148 LBS | TEMPERATURE: 97.8 F | DIASTOLIC BLOOD PRESSURE: 81 MMHG | OXYGEN SATURATION: 99 %

## 2022-06-02 NOTE — END OF VISIT
[FreeTextEntry3] : I agree with the advanced clinical provider's documentation of history, physical examination and plan of care. \par \par

## 2022-06-02 NOTE — HISTORY OF PRESENT ILLNESS
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 [Pseudomonas] : Pseudomonas [Other: ___] : [unfilled] [___] : the last positive Pseudomonas result was [unfilled] [Last AFB Date: ___] : the AFB was performed  [unfilled] [None] : ~He/She~ has no hemoptysis [FVC ___] : the FVC was [unfilled] liters [FEV1: ___] : the FEV1 was [unfilled] liters [] : vest daily [Other ___] : exercise [unfilled] [Fair] : fair [Pancreatic Insufficiency] : pancreatic insufficiency [Pancreatic Enzyme Supp.] : uses pancreatic enzyme supplements [CFRD] : CFRD [Date: ___] : The last HgA1C was performed on [unfilled] [HgA1C Value: ___] : HgA1C value was [unfilled]  [AFB] : the patient had a MAC negative AFB [Headache] : no headache [Congestion] : no nasal congestion [Sinus Pain] : no sinus pain [Pancreatitis] : no pancreatitis [Diarrhea] : no diarrhea [Constipation] : no constipation [Steatorrhea] : no steatorrhea [de-identified] : 32 y/o male CF pt who was dx'ed CF at 6 mo old, + sweat, + 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 Pt also has chronic sinusitis and PI. Hx of COVID-19 12/2021. \par Pt reports to be doing well, denies any symptoms. Doesn't remember last time he took airway clearance nebs/therapies, inhaled antibiotics - all self-discontinued. \par \par Seen today for run in research visit. +Clear runny nose/stuffy nose. Loose stools which happens every so often. No abdominal pain, nausea or vomiting. No sinus congestion, change cough, sputum, hemoptysis, fevers, chills, sob, wheeze, cp, chest tightness. No recent antibiotics

## 2022-06-02 NOTE — PHYSICAL EXAM
[General Appearance - Well Developed] : well developed [Normal Appearance] : normal appearance [Well Groomed] : well groomed [General Appearance - Well Nourished] : well nourished [General Appearance - In No Acute Distress] : no acute distress [Normal Conjunctiva] : the conjunctiva exhibited no abnormalities [Normal Oral Mucosa] : normal oral mucosa [Normal Oropharynx] : normal oropharynx [Neck Appearance] : the appearance of the neck was normal [Apical Impulse] : the apical impulse was normal [Heart Rate And Rhythm] : heart rate was normal and rhythm regular [Heart Sounds] : normal S1 and S2 [Murmurs] : no murmurs [Respiration, Rhythm And Depth] : normal respiratory rhythm and effort [Exaggerated Use Of Accessory Muscles For Inspiration] : no accessory muscle use [Auscultation Breath Sounds / Voice Sounds] : lungs were clear to auscultation bilaterally [Bowel Sounds] : normal bowel sounds [Abdomen Soft] : soft [Abdomen Tenderness] : non-tender [Abnormal Walk] : normal gait [Musculoskeletal - Swelling] : no joint swelling seen [Skin Color & Pigmentation] : normal skin color and pigmentation [] : no rash [No Focal Deficits] : no focal deficits [Oriented To Time, Place, And Person] : oriented to person, place, and time [Impaired Insight] : insight and judgment were intact [Affect] : the affect was normal [FreeTextEntry1] : + digital clubbing

## 2022-06-02 NOTE — ASSESSMENT
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 Pt also has chronic sinusitis and PI. 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 \par \par PULM:  At respiratory baseline, does not produce any sputum, denies cough, exercises routinely. Pt self- stopped ACT, inhaled antibiotics, advair. 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 \par - Pt was on inhaled vanco/cayston prior to sperm extraction with good tolerance. Currently not on any.\par Tolerated Bethkis and tobramycin in past, did not tolerate TOBIpod.\par \par ENT: Denies sinus symptoms, not needing rinses or nasal sprays.\par \par CARDIO Echo wnl mild mitral regurg\par \par ENDO - CFRD: f/u with Endo, on insulin. Reports glucose levels of 150-200s post meals, but peaks after protein shakes, but patient exercises right after. Hypovitaminosis D\par Bone Density 11/28/16' normal T score-1.3\par - overdue 2021 given RX again today. \par - cont Vit D 5000 IUs daily \par \par Nutrition/GI: Using Creon as ordered 5-6 with meals, 3-4 with snacks, no issue. Stool cs/Cdiff if lose bms worsen/persist \par - f/u rd\par \par Sleep- Denies persistent insomnia, no longer needing Ambien.\par \par Urology/Conception: absence of Vas Deferens. Pt reports embryo fertilization and now awaiting genetic analysis. has 3 frozen embryos   \par \par HEP: AST 58, ALT 89, creatinine kinase 346. on research labs, redrawn today. patient has not been exercising as much.\par - Abd US rx given\par \par Health Maintenance:\par Flu vaccine 9/20/21\par DEXA Overdue Rx given again \par CXR 10/ 2020, Rx given again \par COVID19 vaccine completed 2-dose series.\par \par f/u in 2 weeks \par

## 2022-06-03 ENCOUNTER — NON-APPOINTMENT (OUTPATIENT)
Age: 32
End: 2022-06-03

## 2022-06-03 LAB
ALBUMIN SERPL ELPH-MCNC: 5 G/DL
ALP BLD-CCNC: 90 U/L
ALT SERPL-CCNC: 43 U/L
ANION GAP SERPL CALC-SCNC: 19 MMOL/L
AST SERPL-CCNC: 34 U/L
BILIRUB SERPL-MCNC: 0.6 MG/DL
BUN SERPL-MCNC: 22 MG/DL
C DIFF TOX GENS STL QL NAA+PROBE: NORMAL
CALCIUM SERPL-MCNC: 9.9 MG/DL
CDIFF BY PCR: NOT DETECTED
CHLORIDE SERPL-SCNC: 101 MMOL/L
CK SERPL-CCNC: 316 U/L
CO2 SERPL-SCNC: 22 MMOL/L
CREAT SERPL-MCNC: 0.92 MG/DL
EGFR: 115 ML/MIN/1.73M2
GLUCOSE SERPL-MCNC: 126 MG/DL
POTASSIUM SERPL-SCNC: 5.3 MMOL/L
PROT SERPL-MCNC: 8 G/DL
SODIUM SERPL-SCNC: 141 MMOL/L

## 2022-06-22 ENCOUNTER — APPOINTMENT (OUTPATIENT)
Dept: PULMONOLOGY | Facility: CLINIC | Age: 32
End: 2022-06-22

## 2022-06-22 VITALS
OXYGEN SATURATION: 99 % | RESPIRATION RATE: 16 BRPM | DIASTOLIC BLOOD PRESSURE: 80 MMHG | BODY MASS INDEX: 24.63 KG/M2 | HEART RATE: 68 BPM | TEMPERATURE: 98 F | SYSTOLIC BLOOD PRESSURE: 143 MMHG | WEIGHT: 148 LBS

## 2022-06-29 ENCOUNTER — APPOINTMENT (OUTPATIENT)
Dept: PULMONOLOGY | Facility: CLINIC | Age: 32
End: 2022-06-29

## 2022-06-29 VITALS
OXYGEN SATURATION: 98 % | HEIGHT: 65 IN | SYSTOLIC BLOOD PRESSURE: 116 MMHG | RESPIRATION RATE: 16 BRPM | WEIGHT: 146 LBS | BODY MASS INDEX: 24.32 KG/M2 | TEMPERATURE: 98.1 F | DIASTOLIC BLOOD PRESSURE: 73 MMHG | HEART RATE: 72 BPM

## 2022-06-29 LAB — RHODAMINE-AURAMINE STN SPEC: NORMAL

## 2022-06-29 NOTE — HISTORY OF PRESENT ILLNESS
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 [Pseudomonas] : Pseudomonas [Other: ___] : [unfilled] [___] : the last positive Pseudomonas result was [unfilled] [Last AFB Date: ___] : the AFB was performed  [unfilled] [None] : ~He/She~ has no hemoptysis [FVC ___] : the FVC was [unfilled] liters [FEV1: ___] : the FEV1 was [unfilled] liters [] : vest daily [Other ___] : exercise [unfilled] [Fair] : fair [Pancreatic Insufficiency] : pancreatic insufficiency [Pancreatic Enzyme Supp.] : uses pancreatic enzyme supplements [CFRD] : CFRD [Date: ___] : The last HgA1C was performed on [unfilled] [HgA1C Value: ___] : HgA1C value was [unfilled]  [AFB] : the patient had a MAC negative AFB [Headache] : no headache [Congestion] : no nasal congestion [Sinus Pain] : no sinus pain [Pancreatitis] : no pancreatitis [Diarrhea] : no diarrhea [Constipation] : no constipation [Steatorrhea] : no steatorrhea [de-identified] : 32 y/o male CF pt who was dx'ed CF at 6 mo old, + sweat, + 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 Pt also has chronic sinusitis and PI. Hx of COVID-19 12/2021. \par Pt reports to be doing well, denies any symptoms. Doesn't remember last time he took airway clearance nebs/therapies, inhaled antibiotics - all self-discontinued. \par \par Seen today for [id number] research visit. Reports being at respiratory baseline. Normal BMs No abdominal pain, nausea or vomiting. No sinus congestion, change cough, sputum, hemoptysis, fevers, chills, sob, wheeze, cp, chest tightness. No recent antibiotics. Taking insulin but has been without yonathan for a while and not checking FSBGs often. Not exercising, taking different protein shake.

## 2022-06-29 NOTE — ASSESSMENT
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 Pt also has chronic sinusitis and PI. 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 \par \par Pt seen for [id number] Research visit Day 1. All protocols and procedures explained. Pt would like to continue to participate in the study. \par \par PULM:  At respiratory baseline, does not produce any sputum, denies cough. Pt self- stopped ACT, inhaled antibiotics, advair. 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 \par - Pt was on inhaled vanco/cayston prior to sperm extraction with good tolerance. Currently not on any.\par Tolerated Bethkis and tobramycin in past, did not tolerate TOBIpod.\par - sputum swab today CS\par \par ENT: Denies sinus symptoms, not needing rinses or nasal sprays.\par \par CARDIO Echo wnl mild mitral regurg\par \par ENDO - CFRD: f/u with Endo, on insulin. Reports glucose levels of 150-200s post meals, but peaks after protein shakes, but patient exercises right after. Hypovitaminosis D\par Bone Density 11/28/16' normal T score-1.3\par - overdue 2021 given RX AGAIN\par - cont Vit D 5000 IUs daily \par \par Nutrition/GI: Using Creon as ordered 5-6 with meals, 3-4 with snacks \par - f/u rd\par - weight loss 2 lbs start Joanna Rubio gave sample will order to TC \par \par Sleep- Denies persistent insomnia, no longer needing Ambien.\par \par Urology/Conception: absence of Vas Deferens. Pt reports embryo fertilization and now awaiting genetic analysis. has 3 frozen embryos. 1 implanted Female embryo successful so far (2 male embryos on ice)\par \par HEP: AST 58, ALT 89, creatinine kinase 346. on research labs repeat  patient has not been exercising as much switched protein shake\par - Abd US rx given AGAIN\par - CMP and CK will be drawn with research labs today\par \par Health Maintenance:\par Flu vaccine 9/20/21\par DEXA Overdue Rx given again \par CXR 10/ 2020, Rx given again \par COVID19 vaccine completed 2-dose series.\par \par f/u in 2 weeks research\par

## 2022-07-06 LAB — BACTERIA SPT CF RESP CULT: ABNORMAL

## 2022-07-13 ENCOUNTER — APPOINTMENT (OUTPATIENT)
Dept: PULMONOLOGY | Facility: CLINIC | Age: 32
End: 2022-07-13

## 2022-07-13 VITALS
BODY MASS INDEX: 24.49 KG/M2 | RESPIRATION RATE: 16 BRPM | HEART RATE: 72 BPM | OXYGEN SATURATION: 99 % | WEIGHT: 147 LBS | TEMPERATURE: 98.5 F | HEIGHT: 65 IN | DIASTOLIC BLOOD PRESSURE: 71 MMHG | SYSTOLIC BLOOD PRESSURE: 125 MMHG

## 2022-07-27 ENCOUNTER — APPOINTMENT (OUTPATIENT)
Dept: ENDOCRINOLOGY | Facility: CLINIC | Age: 32
End: 2022-07-27

## 2022-07-29 ENCOUNTER — RESULT REVIEW (OUTPATIENT)
Age: 32
End: 2022-07-29

## 2022-07-29 ENCOUNTER — APPOINTMENT (OUTPATIENT)
Dept: PULMONOLOGY | Facility: CLINIC | Age: 32
End: 2022-07-29

## 2022-07-29 VITALS
WEIGHT: 144 LBS | OXYGEN SATURATION: 98 % | HEIGHT: 65 IN | BODY MASS INDEX: 23.99 KG/M2 | TEMPERATURE: 98.1 F | SYSTOLIC BLOOD PRESSURE: 137 MMHG | HEART RATE: 71 BPM | RESPIRATION RATE: 16 BRPM | DIASTOLIC BLOOD PRESSURE: 81 MMHG

## 2022-07-29 DIAGNOSIS — N63.0 UNSPECIFIED LUMP IN UNSPECIFIED BREAST: ICD-10-CM

## 2022-07-29 DIAGNOSIS — R14.0 ABDOMINAL DISTENSION (GASEOUS): ICD-10-CM

## 2022-07-29 DIAGNOSIS — N63.20 UNSPECIFIED LUMP IN THE LEFT BREAST, UNSPECIFIED QUADRANT: ICD-10-CM

## 2022-07-29 NOTE — ASSESSMENT
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 Pt also has chronic sinusitis and PI. 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 \par \par Pt seen for [id number] Research visit Week4. All protocols and procedures explained. Pt would like to continue to participate in the study. \par \par PULM:  At respiratory baseline, does not produce any sputum, denies cough. Pt self- stopped ACT, inhaled antibiotics, advair. 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 \par - Pt was on inhaled vanco/cayston prior to sperm extraction with good tolerance. Currently not on any.\par Tolerated Bethkis and tobramycin in past, did not tolerate TOBIpod.\par \par ENT: Denies sinus symptoms, not needing rinses or nasal sprays.\par \par CARDIO Echo wnl mild mitral regurg\par \par ENDO - CFRD on insulin. Reports glucose levels of 150-200s post meals. Hypovitaminosis D. Few hypoglycemic events.\par Bone Density 11/28/16' normal T score-1.3\par - overdue 2021 given RX AGAIN\par - cont Vit D 5000 IUs daily \par - F/u with Endo \par \par Nutrition/GI: Using Creon as ordered 5-6 with meals, 3-4 with snacks. Continued Weight loss KF from TC chocolate. Diarrhea/Bloating\par - f/u rd (consider swtich enzymes)\par - Cont [employer] increase to BID \par - Start Omeprazole \par - Stool CS/Cdiff/O&P ordered again given supplies \par - F/u GI Dr Chan \par \par Sleep- Denies persistent insomnia, no longer needing Ambien.\par \par Urology/Conception: absence of Vas Deferens. Pt reports embryo fertilization and now awaiting genetic analysis. has 3 frozen embryos. 1 implanted Female embryo successful so far (2 male embryos on ice) Wife 8 weeks pregnant\par \par HEP: AST 58, ALT 89, creatinine kinase 346. on research labs repeat  patient has not been exercising as much switched protein shake\par - Abd US rx given AGAIN\par - Repeat labs with research today \par \par Left breast mass \par - Left breast US\par \par Health Maintenance:\par Flu vaccine 9/20/21\par DEXA Overdue Rx given again \par CXR 10/ 2020, Rx given again \par COVID19 vaccine completed 2-dose series.\par \par f/u in 1 month research

## 2022-07-29 NOTE — PHYSICAL EXAM
[General Appearance - Well Developed] : well developed [Normal Appearance] : normal appearance [Well Groomed] : well groomed [General Appearance - Well Nourished] : well nourished [General Appearance - In No Acute Distress] : no acute distress [Normal Conjunctiva] : the conjunctiva exhibited no abnormalities [Normal Oral Mucosa] : normal oral mucosa [Normal Oropharynx] : normal oropharynx [Neck Appearance] : the appearance of the neck was normal [Apical Impulse] : the apical impulse was normal [Heart Rate And Rhythm] : heart rate was normal and rhythm regular [Heart Sounds] : normal S1 and S2 [Murmurs] : no murmurs [Respiration, Rhythm And Depth] : normal respiratory rhythm and effort [Exaggerated Use Of Accessory Muscles For Inspiration] : no accessory muscle use [Bowel Sounds] : normal bowel sounds [Abdomen Soft] : soft [Abnormal Walk] : normal gait [Abdomen Tenderness] : non-tender [Musculoskeletal - Swelling] : no joint swelling seen [Skin Color & Pigmentation] : normal skin color and pigmentation [] : no rash [No Focal Deficits] : no focal deficits [Oriented To Time, Place, And Person] : oriented to person, place, and time [Impaired Insight] : insight and judgment were intact [Affect] : the affect was normal [FreeTextEntry1] : + digital clubbing

## 2022-08-04 ENCOUNTER — NON-APPOINTMENT (OUTPATIENT)
Age: 32
End: 2022-08-04

## 2022-08-24 ENCOUNTER — APPOINTMENT (OUTPATIENT)
Dept: PULMONOLOGY | Facility: CLINIC | Age: 32
End: 2022-08-24

## 2022-08-24 VITALS
DIASTOLIC BLOOD PRESSURE: 83 MMHG | WEIGHT: 142 LBS | OXYGEN SATURATION: 98 % | HEIGHT: 65 IN | HEART RATE: 78 BPM | BODY MASS INDEX: 23.66 KG/M2 | RESPIRATION RATE: 16 BRPM | SYSTOLIC BLOOD PRESSURE: 132 MMHG | TEMPERATURE: 98.3 F

## 2022-08-26 ENCOUNTER — APPOINTMENT (OUTPATIENT)
Dept: RADIOLOGY | Facility: CLINIC | Age: 32
End: 2022-08-26

## 2022-08-26 ENCOUNTER — APPOINTMENT (OUTPATIENT)
Dept: ULTRASOUND IMAGING | Facility: CLINIC | Age: 32
End: 2022-08-26

## 2022-08-26 PROCEDURE — 77080 DXA BONE DENSITY AXIAL: CPT

## 2022-08-26 PROCEDURE — 71046 X-RAY EXAM CHEST 2 VIEWS: CPT

## 2022-08-26 PROCEDURE — 76700 US EXAM ABDOM COMPLETE: CPT

## 2022-08-29 ENCOUNTER — NON-APPOINTMENT (OUTPATIENT)
Age: 32
End: 2022-08-29

## 2022-09-01 ENCOUNTER — OUTPATIENT (OUTPATIENT)
Dept: OUTPATIENT SERVICES | Facility: HOSPITAL | Age: 32
LOS: 1 days | End: 2022-09-01
Payer: COMMERCIAL

## 2022-09-01 ENCOUNTER — APPOINTMENT (OUTPATIENT)
Dept: ULTRASOUND IMAGING | Facility: CLINIC | Age: 32
End: 2022-09-01

## 2022-09-01 ENCOUNTER — RESULT REVIEW (OUTPATIENT)
Age: 32
End: 2022-09-01

## 2022-09-01 ENCOUNTER — APPOINTMENT (OUTPATIENT)
Dept: MAMMOGRAPHY | Facility: CLINIC | Age: 32
End: 2022-09-01

## 2022-09-01 DIAGNOSIS — Z00.8 ENCOUNTER FOR OTHER GENERAL EXAMINATION: ICD-10-CM

## 2022-09-01 DIAGNOSIS — Z98.890 OTHER SPECIFIED POSTPROCEDURAL STATES: Chronic | ICD-10-CM

## 2022-09-01 PROCEDURE — 76642 ULTRASOUND BREAST LIMITED: CPT

## 2022-09-01 PROCEDURE — 76642 ULTRASOUND BREAST LIMITED: CPT | Mod: 26,LT

## 2022-09-02 ENCOUNTER — NON-APPOINTMENT (OUTPATIENT)
Age: 32
End: 2022-09-02

## 2022-09-02 LAB
C DIFF TOX GENS STL QL NAA+PROBE: NORMAL
CDIFF BY PCR: NOT DETECTED

## 2022-09-07 LAB
BACTERIA STL CULT: NORMAL
DEPRECATED O AND P PREP STL: ABNORMAL

## 2022-09-10 ENCOUNTER — TRANSCRIPTION ENCOUNTER (OUTPATIENT)
Age: 32
End: 2022-09-10

## 2022-09-21 ENCOUNTER — APPOINTMENT (OUTPATIENT)
Dept: PULMONOLOGY | Facility: CLINIC | Age: 32
End: 2022-09-21

## 2022-09-21 VITALS
RESPIRATION RATE: 16 BRPM | OXYGEN SATURATION: 99 % | HEIGHT: 65 IN | DIASTOLIC BLOOD PRESSURE: 75 MMHG | WEIGHT: 143 LBS | BODY MASS INDEX: 23.82 KG/M2 | SYSTOLIC BLOOD PRESSURE: 120 MMHG | HEART RATE: 65 BPM | TEMPERATURE: 97.3 F

## 2022-10-19 ENCOUNTER — APPOINTMENT (OUTPATIENT)
Dept: PULMONOLOGY | Facility: CLINIC | Age: 32
End: 2022-10-19

## 2022-10-19 VITALS
WEIGHT: 137 LBS | OXYGEN SATURATION: 98 % | TEMPERATURE: 97.7 F | DIASTOLIC BLOOD PRESSURE: 83 MMHG | HEIGHT: 65 IN | HEART RATE: 79 BPM | BODY MASS INDEX: 22.82 KG/M2 | SYSTOLIC BLOOD PRESSURE: 110 MMHG | RESPIRATION RATE: 16 BRPM

## 2022-11-08 ENCOUNTER — APPOINTMENT (OUTPATIENT)
Dept: ENDOCRINOLOGY | Facility: CLINIC | Age: 32
End: 2022-11-08

## 2022-11-16 ENCOUNTER — NON-APPOINTMENT (OUTPATIENT)
Age: 32
End: 2022-11-16

## 2022-12-15 ENCOUNTER — NON-APPOINTMENT (OUTPATIENT)
Age: 32
End: 2022-12-15

## 2022-12-15 ENCOUNTER — APPOINTMENT (OUTPATIENT)
Dept: PULMONOLOGY | Facility: CLINIC | Age: 32
End: 2022-12-15

## 2022-12-15 VITALS
HEART RATE: 75 BPM | RESPIRATION RATE: 16 BRPM | BODY MASS INDEX: 22.99 KG/M2 | SYSTOLIC BLOOD PRESSURE: 133 MMHG | WEIGHT: 138 LBS | TEMPERATURE: 97.8 F | OXYGEN SATURATION: 99 % | DIASTOLIC BLOOD PRESSURE: 78 MMHG | HEIGHT: 65 IN

## 2022-12-15 RX ORDER — NEBULIZER
EACH MISCELLANEOUS
Qty: 1 | Refills: 0 | Status: DISCONTINUED | COMMUNITY
Start: 2021-03-03 | End: 2022-12-15

## 2022-12-15 RX ORDER — OMEPRAZOLE 20 MG/1
20 TABLET, DELAYED RELEASE ORAL DAILY
Qty: 30 | Refills: 2 | Status: DISCONTINUED | COMMUNITY
Start: 2022-07-29 | End: 2022-12-15

## 2022-12-15 RX ORDER — METRONIDAZOLE 500 MG/1
500 TABLET ORAL
Qty: 42 | Refills: 0 | Status: DISCONTINUED | COMMUNITY
Start: 2022-09-07 | End: 2022-12-15

## 2022-12-15 NOTE — HISTORY OF PRESENT ILLNESS
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 Pt also has chronic sinusitis and PI. Hx of COVID-19 12/2021. \par \par Seen today in office for [id number] Week 24 research visit. Pt called office this morning reporting URI sxs. + sore throat, nasal/sinus congestion x 1 day. many sick contacts at work. Wife pregnant due March. Taking insulin but has been without yonathan for a while and not checking FSBGs often. Left breast lump had US a few months ago noted fibroglandular tissue, pt feels like it has gotten a little larger. . No nipple d/c, bleeding, erythema or skin changes. Continues to have hypoglycemic episodes intermittent episodes 40s-60s, halved his insulin regimen. Symptomatic when he was hypoglycemic, sweaty/palps, no loc. Good appetite. No change cough, sputum, hemoptysis, fevers, chills, sob, wheeze, cp, chest tightness, abd pain, flatulence, nausea.\par \par PULM: Alb/HS/Pulmozyme + Vest less than daily \par Was previously on inhaled Vanco and Cayston with some benefit while off of modulator therapy for sperm extraction in past. (High out of pocket costs not covered) \par Self stopped Advair months ago \par \par Nutrition: Using Pillars[employer]Life once a day.  (Takes 4 enzymes with each shake) \par GI: chronic bloating resolved with Metronidazole no GERD off PPI, intermittent loose stools. Still hasn’t seen GI\par Left breast mass - US showed fibroglandular tissue noted gynecomastia \par

## 2022-12-15 NOTE — ASSESSMENT
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 Pt also has chronic sinusitis and PI. Dx'ed CFRD on insulin.\par \par Pt seen for [id number] Research visit Week 24. All protocols and procedures explained. Pt would like to continue to participate in the study. Here today with URI symptoms \par \par PULM: Viral Syndrome  \par Pt on irregular ACT\par - Fev1 79% (12/15/22), 82% (7/29/22), 79% (6/29/22) 76% (home 6/29/22), 84% (9/20/21), 93%, 76% (home aisha 6/1/21), 59% (5/25/21), 78% (3/30/21 off study drug), 84% (1/8/20), 78% (10/16/20),  81% (7/21/20), 79% (1/13/20), 81% (12/11/19), 81% (11/26/19), 81% (11/13/19), 84% (6/2019)\par - Resume ACT BID \par - Sputum CS \par - Low threshold for need of Antibx pt often gets pulm exacerbation with Viral URI - Will Rx Doxy if PEX symptoms develop \par - RVP \par \par CXR:10/10/20 Reticular opacities and bronchiectasis slight improvement in L mid to lower lung periphery but other wise no significant internal change \par \par 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 \par - Pt was on inhaled vanco/cayston prior to sperm extraction with good tolerance. Currently not on any.\par Tolerated Bethkis and tobramycin in past, did not tolerate TOBIpod.\par - Sputum CS Cup \par - Discussed potential need for inhaled antibiotics more regularly QOM (vanco if approved with new insurance in new year - also pending sputum) \par \par ENT: Nasal congestion \par - Nasal spray \par \par CARDIO Echo wnl mild mitral regurg\par \par ENDO - CFRD on insulin. Reports glucose levels of 150-200s post meals. Hypovitaminosis D. Few hypoglycemic events. OSTEOPENIA not taking Vit D / Ca \par - Repeat DEXA due 2024\par - Start Vit D 2000Ius and Ca 1200, LWBE\par - F/u with Endo \par \par Nutrition/GI: Using Creon as ordered 5-6 with meals, 3-4 with snacks. Continued Weight loss KF from TC chocolate. Trialed Metronidazole 9/2022. Still with Diarrhea/Bloating. Stopped Omeprazole\par - f/u rd \par - Switch to Pertzye \par - Cont [employer] increase to BID \par - F/u GI Dr Chan \par - Reordered Vit DEK\par \par Sleep- Denies persistent insomnia, no longer needing Ambien.\par \par Urology/Conception: absence of Vas Deferens. Pt reports embryo fertilization and now awaiting genetic analysis. has 3 frozen embryos. 1 implanted Female embryo successful so far (2 male embryos on ice) Wife pregnant DEYANIRA 3/8/23\par \par HEP: Hx of elevated CK on research labs\par - Has Abd US has RX again \par \par Left Breast mass increased in size U/S Aug 2022 Noted gynecomastia with fibroglandular tissue \par - Referred to Breast Dr Mcgrath \par \par Health Maintenance:\par Flu vaccine 2022 due when well\par CXR 8/2022 Rx given again \par COVID19 vaccine completed 2-dose series.\par \par f/u 3 months

## 2022-12-16 ENCOUNTER — NON-APPOINTMENT (OUTPATIENT)
Age: 32
End: 2022-12-16

## 2022-12-16 LAB
25(OH)D3 SERPL-MCNC: 20.5 NG/ML
RAPID RVP RESULT: NOT DETECTED
SARS-COV-2 RNA PNL RESP NAA+PROBE: NOT DETECTED

## 2022-12-19 ENCOUNTER — NON-APPOINTMENT (OUTPATIENT)
Age: 32
End: 2022-12-19

## 2022-12-22 ENCOUNTER — NON-APPOINTMENT (OUTPATIENT)
Age: 32
End: 2022-12-22

## 2022-12-22 LAB
A-TOCOPHEROL VIT E SERPL-MCNC: 4.6 MG/L
BETA+GAMMA TOCOPHEROL SERPL-MCNC: 1 MG/L

## 2023-01-05 ENCOUNTER — NON-APPOINTMENT (OUTPATIENT)
Age: 33
End: 2023-01-05

## 2023-01-05 LAB
BACTERIA SPT CF RESP CULT: ABNORMAL
MENADIONE SERPL-MCNC: 0.13 NG/ML

## 2023-01-06 ENCOUNTER — APPOINTMENT (OUTPATIENT)
Dept: ENDOCRINOLOGY | Facility: CLINIC | Age: 33
End: 2023-01-06

## 2023-01-10 ENCOUNTER — NON-APPOINTMENT (OUTPATIENT)
Age: 33
End: 2023-01-10

## 2023-01-17 ENCOUNTER — NON-APPOINTMENT (OUTPATIENT)
Age: 33
End: 2023-01-17

## 2023-01-17 RX ORDER — PANCRELIPASE 24000; 90750; 86250 [USP'U]/1; [USP'U]/1; [USP'U]/1
24000-86250 CAPSULE, DELAYED RELEASE ORAL
Qty: 720 | Refills: 5 | Status: ACTIVE | COMMUNITY
Start: 2022-12-23 | End: 1900-01-01

## 2023-01-26 ENCOUNTER — NON-APPOINTMENT (OUTPATIENT)
Age: 33
End: 2023-01-26

## 2023-02-03 ENCOUNTER — NON-APPOINTMENT (OUTPATIENT)
Age: 33
End: 2023-02-03

## 2023-02-10 ENCOUNTER — NON-APPOINTMENT (OUTPATIENT)
Age: 33
End: 2023-02-10

## 2023-02-14 ENCOUNTER — NON-APPOINTMENT (OUTPATIENT)
Age: 33
End: 2023-02-14

## 2023-02-14 RX ORDER — SODIUM CHLORIDE FOR INHALATION 7 %
7 VIAL, NEBULIZER (ML) INHALATION
Qty: 1 | Refills: 3 | Status: ACTIVE | COMMUNITY
Start: 2023-02-14 | End: 1900-01-01

## 2023-02-14 RX ORDER — DORNASE ALFA 1 MG/ML
2.5 SOLUTION RESPIRATORY (INHALATION)
Qty: 2 | Refills: 6 | Status: ACTIVE | COMMUNITY
Start: 2023-02-14 | End: 1900-01-01

## 2023-02-15 ENCOUNTER — NON-APPOINTMENT (OUTPATIENT)
Age: 33
End: 2023-02-15

## 2023-02-17 ENCOUNTER — NON-APPOINTMENT (OUTPATIENT)
Age: 33
End: 2023-02-17

## 2023-02-27 NOTE — PROGRESS NOTE ADULT - PROBLEM SELECTOR PROBLEM 5
Problem: Adult Mental Health  Goal: Reports or exhibits reduction in symptoms associated with elevated or labile mood/haris  Outcome: Outcome Not Met, Continue to Monitor  Goal: Reports or exhibits improvement in depressive mood signs/symptoms  Outcome: Outcome Not Met, Continue to Monitor  Goal: Reports or exhibits an improvement in mood signs/symptoms associated with anxiety  Outcome: Outcome Not Met, Continue to Monitor  Goal: Demonstrates ability to proactively perform self cares  Outcome: Outcome Not Met, Continue to Monitor  Goal: Demonstrates the ability to engage in reality-based communication and refrains from acting on delusional thoughts  Outcome: Outcome Not Met, Continue to Monitor  Goal: Reports or exhibits hallucinations absent or at manageable level  Outcome: Outcome Not Met, Continue to Monitor  Goal: Demonstrates improved ability to track conversation, process information  Outcome: Outcome Not Met, Continue to Monitor  Goal: Reports decrease in thoughts of suicide  Outcome: Outcome Met, Continue evaluating goal progress toward completion  Goal: Reports decrease in thoughts of violence  Outcome: Outcome Met, Continue evaluating goal progress toward completion  Goal: Reports decrease in thoughts of self harm  Outcome: Outcome Met, Continue evaluating goal progress toward completion  Goal: Verbalizes when symptoms of illness are triggered and reports to staff when experiencing urges/intent of suicide  Outcome: Outcome Not Met, Continue to Monitor  Goal: Verbalizes when symptoms of illness are triggered and reports to staff when experiencing urges/intent of violence  Outcome: Outcome Not Met, Continue to Monitor  Goal: Verbalizes when symptoms of illness are triggered and reports to staff when experiencing urges/intent of self harm  Outcome: Outcome Not Met, Continue to Monitor  Goal: Denies having a suicidal plan  Outcome: Outcome Met, Continue evaluating goal progress toward completion  Goal: 
Denies having a homicidal plan  Outcome: Outcome Met, Continue evaluating goal progress toward completion  Goal: Verbalizes understanding of positive individual coping skills  Outcome: Outcome Not Met, Continue to Monitor  Goal: Demonstrates control of behavior, refraining from hostility, aggression or threats of violence  Outcome: Outcome Not Met, Continue to Monitor  Goal: Refrains from self harm behavior/cutting  Outcome: Outcome Not Met, Continue to Monitor  Goal: Verbalizes understanding of diagnosis, reason for treament and treatment program  Outcome: Outcome Not Met, Continue to Monitor  Goal: Verbalizes understanding of medication management/monitoring/assessment of effectiveness  Outcome: Outcome Not Met, Continue to Monitor  Goal: Demonstrates or reports decrease in symptoms of paranoia or delusional thoughts  Outcome: Outcome Not Met, Continue to Monitor     Problem: Coping, Ineffective  Goal: Identifies personal stressors  Outcome: Outcome Not Met, Continue to Monitor  Goal: Identifies potential coping strategies  Outcome: Outcome Not Met, Continue to Monitor  Goal: Identifies benefits and consequences of choices/behavior(s)  (Patient)  Outcome: Outcome Not Met, Continue to Monitor  Goal: Identifies benefits and consequences of choices/behavior(s)  (Family)  Outcome: Outcome Not Met, Continue to Monitor  Goal: Identifies personal or community-based support systems (Patient)  Outcome: Outcome Not Met, Continue to Monitor  Goal: Identifies personal or community-based support systems (Family)  Outcome: Outcome Not Met, Continue to Monitor  Goal: Verbalizes understanding of grief process (if appropriate)  Description: Document education using the patient education activity.   Outcome: Outcome Not Met, Continue to Monitor  Goal: Verbalizes understanding of stress reaction/coping  Description: Document education using the patient education activity.   Outcome: Outcome Not Met, Continue to Monitor     
Need for prophylactic measure

## 2023-02-28 NOTE — ED ADULT TRIAGE NOTE - NSTRIAGECARE_GEN_A_ER
Patient tolerated OT treatment fair. Sup>sit with mod I. Patient sat EOB x 15 min with independence. Continue recommending HHOT with 24/7 SPV at d/c.    Face Mask

## 2023-03-03 ENCOUNTER — APPOINTMENT (OUTPATIENT)
Dept: PULMONOLOGY | Facility: CLINIC | Age: 33
End: 2023-03-03

## 2023-03-03 VITALS
TEMPERATURE: 97.9 F | SYSTOLIC BLOOD PRESSURE: 136 MMHG | HEART RATE: 76 BPM | WEIGHT: 136 LBS | BODY MASS INDEX: 22.63 KG/M2 | OXYGEN SATURATION: 99 % | RESPIRATION RATE: 16 BRPM | DIASTOLIC BLOOD PRESSURE: 86 MMHG

## 2023-03-03 NOTE — DISCHARGE NOTE ADULT - HOME CARE AGENCY
[address] [facility] [id number] / A
[facility] [phone]

## 2023-03-12 ENCOUNTER — RX RENEWAL (OUTPATIENT)
Age: 33
End: 2023-03-12

## 2023-03-12 RX ORDER — FLASH GLUCOSE SENSOR
KIT MISCELLANEOUS
Qty: 6 | Refills: 1 | Status: ACTIVE | COMMUNITY
Start: 2019-03-30 | End: 1900-01-01

## 2023-03-24 ENCOUNTER — NON-APPOINTMENT (OUTPATIENT)
Age: 33
End: 2023-03-24

## 2023-03-28 ENCOUNTER — RX RENEWAL (OUTPATIENT)
Age: 33
End: 2023-03-28

## 2023-04-04 ENCOUNTER — APPOINTMENT (OUTPATIENT)
Dept: ENDOCRINOLOGY | Facility: CLINIC | Age: 33
End: 2023-04-04
Payer: COMMERCIAL

## 2023-04-04 VITALS
DIASTOLIC BLOOD PRESSURE: 79 MMHG | BODY MASS INDEX: 23.16 KG/M2 | HEART RATE: 69 BPM | WEIGHT: 139 LBS | SYSTOLIC BLOOD PRESSURE: 119 MMHG | HEIGHT: 65 IN | OXYGEN SATURATION: 98 %

## 2023-04-04 DIAGNOSIS — M85.80 OTHER SPECIFIED DISORDERS OF BONE DENSITY AND STRUCTURE, UNSPECIFIED SITE: ICD-10-CM

## 2023-04-04 LAB — HBA1C MFR BLD HPLC: 7.1

## 2023-04-04 PROCEDURE — 83036 HEMOGLOBIN GLYCOSYLATED A1C: CPT | Mod: QW

## 2023-04-04 PROCEDURE — 99214 OFFICE O/P EST MOD 30 MIN: CPT

## 2023-04-04 RX ORDER — INSULIN PMP CART,AUT,G6/7,CNTR
EACH SUBCUTANEOUS
Qty: 1 | Refills: 0 | Status: ACTIVE | COMMUNITY
Start: 2023-04-04 | End: 1900-01-01

## 2023-04-04 RX ORDER — BLOOD-GLUCOSE SENSOR
EACH MISCELLANEOUS
Qty: 3 | Refills: 3 | Status: ACTIVE | COMMUNITY
Start: 2023-04-04 | End: 1900-01-01

## 2023-04-04 RX ORDER — BLOOD-GLUCOSE TRANSMITTER
EACH MISCELLANEOUS
Qty: 1 | Refills: 3 | Status: ACTIVE | COMMUNITY
Start: 2023-04-04 | End: 1900-01-01

## 2023-04-04 NOTE — HISTORY OF PRESENT ILLNESS
[FreeTextEntry1] : Patient is a 32-year-old man with history of cystic fibrosis diagnosed at age 6 months, here to follow-up cystic fibrosis related diabetes mellitus, and osteopenia\par \benson Recently started on a new trial drug.  He was Previously on Trikafta.  He noticed that his insulin sensitivity improve on this new medication.  He is currently using NovoLog 1 units with his meals.  He is working outside most of the day.  Often forget to bring his insulin pen with him.  He is interested in using an OmniPod 5 insulin pump.  In the past he had used OmniPod with a basal rate of 0.05 units/h to prevent clotting.\par \par He utilizes a freestyle yonathan sensor.  However only 9% active.\par \benson Reports that his glucose has been within normal range mostly.\benson \benson Needs a referral to ophthalmology.  He checks his feet daily.  Foot exam done today within normal limits.\par shu Recently become a new father.  Baby daughter is 6 weeks old.\par \benson

## 2023-04-04 NOTE — ASSESSMENT
[FreeTextEntry1] : Mr. SOCORRO STEPHEN is 32 year old male here for evaluation of CFRD, on meal time insulin only \par \par 1.  Cystic fibrosis related diabetes mellitus\par A1c 7.1% April 2023\par Likely secondary to missing mealtime insulin.\par Recommend to continue with NovoLog 1 to 2 units 3 times daily with meals.\par - Once patient obtains Omni pod system, he will need an appointment with our certified diabetes educator for training.  We will likely set up basal rate of 0.05 units/h.  And manual bolus of 1 to 2 units 3 times daily with meals.\par Foot exam done today within normal limits 4/4/2023\par Referral for ophthalmology is given.\par \par 2.  Vitamin D deficiency\par Patient is taking vitamin D 2000 IU daily.\par \par 3.  Osteopenia\par Last bone density 1 year ago\par Continue with vitamin D supplementation.\par Weightbearing exercise.\par Optimize nutritional status.\par \par Follow-up in 6 months\par

## 2023-04-04 NOTE — PHYSICAL EXAM
[Alert] : alert [Well Nourished] : well nourished [No Acute Distress] : no acute distress [Well Developed] : well developed [Normal Sclera/Conjunctiva] : normal sclera/conjunctiva [EOMI] : extra ocular movement intact [No Proptosis] : no proptosis [Normal Oropharynx] : the oropharynx was normal [Thyroid Not Enlarged] : the thyroid was not enlarged [No Thyroid Nodules] : no palpable thyroid nodules [No Respiratory Distress] : no respiratory distress [No Accessory Muscle Use] : no accessory muscle use [Clear to Auscultation] : lungs were clear to auscultation bilaterally [Normal S1, S2] : normal S1 and S2 [Normal Rate] : heart rate was normal [Regular Rhythm] : with a regular rhythm [No Edema] : no peripheral edema [Pedal Pulses Normal] : the pedal pulses are present [Normal Bowel Sounds] : normal bowel sounds [Not Tender] : non-tender [Not Distended] : not distended [Soft] : abdomen soft [Normal Anterior Cervical Nodes] : no anterior cervical lymphadenopathy [No Spinal Tenderness] : no spinal tenderness [Spine Straight] : spine straight [No Stigmata of Cushings Syndrome] : no stigmata of Cushings Syndrome [Normal Gait] : normal gait [Normal Strength/Tone] : muscle strength and tone were normal [No Rash] : no rash [Acanthosis Nigricans] : no acanthosis nigricans [Normal Reflexes] : deep tendon reflexes were 2+ and symmetric [No Tremors] : no tremors [Oriented x3] : oriented to person, place, and time Started first trimester

## 2023-04-05 ENCOUNTER — NON-APPOINTMENT (OUTPATIENT)
Age: 33
End: 2023-04-05

## 2023-04-05 LAB
CREAT SPEC-SCNC: 180 MG/DL
MICROALBUMIN 24H UR DL<=1MG/L-MCNC: <1.2 MG/DL
MICROALBUMIN/CREAT 24H UR-RTO: NORMAL MG/G

## 2023-04-05 RX ORDER — INSULIN ASPART 100 [IU]/ML
100 INJECTION, SOLUTION INTRAVENOUS; SUBCUTANEOUS
Qty: 1 | Refills: 2 | Status: ACTIVE | COMMUNITY
Start: 2019-03-29 | End: 1900-01-01

## 2023-04-07 ENCOUNTER — NON-APPOINTMENT (OUTPATIENT)
Age: 33
End: 2023-04-07

## 2023-04-19 ENCOUNTER — APPOINTMENT (OUTPATIENT)
Dept: ENDOCRINOLOGY | Facility: CLINIC | Age: 33
End: 2023-04-19
Payer: COMMERCIAL

## 2023-04-19 PROCEDURE — P0004: CPT

## 2023-05-05 ENCOUNTER — NON-APPOINTMENT (OUTPATIENT)
Age: 33
End: 2023-05-05

## 2023-06-05 ENCOUNTER — NON-APPOINTMENT (OUTPATIENT)
Age: 33
End: 2023-06-05

## 2023-06-26 ENCOUNTER — APPOINTMENT (OUTPATIENT)
Dept: PULMONOLOGY | Facility: CLINIC | Age: 33
End: 2023-06-26

## 2023-06-26 VITALS
WEIGHT: 139 LBS | RESPIRATION RATE: 16 BRPM | SYSTOLIC BLOOD PRESSURE: 135 MMHG | HEART RATE: 78 BPM | HEIGHT: 65 IN | OXYGEN SATURATION: 99 % | TEMPERATURE: 98.3 F | DIASTOLIC BLOOD PRESSURE: 73 MMHG | BODY MASS INDEX: 23.16 KG/M2

## 2023-06-26 RX ORDER — PANCRELIPASE LIPASE, PANCRELIPASE AMYLASE, AND PANCRELIPASE PROTEASE 149900; 37000; 97300 [USP'U]/1; [USP'U]/1; [USP'U]/1
37000-97300 CAPSULE, DELAYED RELEASE ORAL
Qty: 540 | Refills: 1 | Status: DISCONTINUED | COMMUNITY
Start: 2022-12-27 | End: 2023-06-26

## 2023-06-26 RX ORDER — DOXYCYCLINE HYCLATE 100 MG/1
100 CAPSULE ORAL
Qty: 28 | Refills: 0 | Status: DISCONTINUED | COMMUNITY
Start: 2022-12-18 | End: 2023-06-26

## 2023-06-26 RX ORDER — PANCRELIPASE LIPASE, PANCRELIPASE AMYLASE, AND PANCRELIPASE PROTEASE 149900; 37000; 97300 [USP'U]/1; [USP'U]/1; [USP'U]/1
37000-97300 CAPSULE, DELAYED RELEASE ORAL
Qty: 540 | Refills: 1 | Status: DISCONTINUED | COMMUNITY
Start: 2023-02-03 | End: 2023-06-26

## 2023-06-26 NOTE — HISTORY OF PRESENT ILLNESS
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 [Pseudomonas] : Pseudomonas [Other: ___] : [unfilled] [___] : the last positive Pseudomonas result was [unfilled] [Last AFB Date: ___] : the AFB was performed  [unfilled] [None] : ~He/She~ has no hemoptysis [FVC ___] : the FVC was [unfilled] liters [FEV1: ___] : the FEV1 was [unfilled] liters [] : vest daily [Other ___] : exercise [unfilled] [Fair] : fair [Pancreatic Insufficiency] : pancreatic insufficiency [Pancreatic Enzyme Supp.] : uses pancreatic enzyme supplements [CFRD] : CFRD [Date: ___] : The last HgA1C was performed on [unfilled] [HgA1C Value: ___] : HgA1C value was [unfilled]  [AFB] : the patient had a MAC negative AFB [Headache] : no headache [Congestion] : no nasal congestion [Sinus Pain] : no sinus pain [Pancreatitis] : no pancreatitis [Diarrhea] : no diarrhea [Constipation] : no constipation [Steatorrhea] : no steatorrhea [de-identified] : 31 y/o male CF pt who was dx'ed CF at 6 mo old, + sweat, + 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 Pt also has chronic sinusitis and PI. Hx of COVID-19 12/2021. \par \par Seen today in office for [id number] rollover into [id number] open label. Pt is doing well from respiratory standpoint. Not using nebs/act feels dry. Baby is 4 months old. Using DEXCOM with Ombni pod reporting improvement in FSBGs no hypoglycemic episodes. Appetite is good but endorsing increased  bloating, soft/oily stools will little improvement since on Pertzye for a few months.  No change cough, sputum, hemoptysis, fevers, chills, sob, wheeze, cp, chest tightness, abd pain, flatulence, nausea. Plan to implant Boy embryo in Nov 23'\par \par PULM: Alb/HS/Pulmozyme + Vest rarely\par Was previously on inhaled Vanco and Cayston with some benefit while off of modulator therapy for sperm extraction in past. (High out of pocket costs not covered) \par Self stopped Advair months ago \par \par Nutrition: Using Glucerna once a day.  (Takes 4 enzymes with each shake) used to use KF\par GI: chronic bloating resolved with Metronidazole for short time, no GERD off PPI, intermittent loose stools. Still hasn’t seen GI\par Left breast mass - US showed fibroglandular tissue noted gynecomastia

## 2023-06-26 NOTE — ASSESSMENT
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 Pt also has chronic sinusitis and PI. Dx'ed CFRD on insulin.\par \par Pt seen for [id number] week 52 rollover into [id number] All protocols and procedures explained. Pt would like to continue to participate in the study. Here today at respiratory baseline. 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 \par - Pt was on inhaled vanco/cayston prior to sperm extraction with good tolerance.\par Tolerated Bethkis and tobramycin in past, did not tolerate TOBIpod.\par - Sputum CS cup\par - Discussed importance of inhaled antibx pt amenable to TOBIpod for feasibility, Can consider Vanco inh QOM if can not get approval\par \par CARDIO Echo wnl mild mitral regurg 2020\par - EKG today with elevated polarization in pre-cordial leads cont to monitor \par \par \par ENDO - CFRD on insulin. Reports glucose levels of 150-200s post meals. Hypovitaminosis D. Few hypoglycemic events. OSTEOPENIA not taking Vit D / Ca \par - Repeat DEXA due 9/2024\par -Resume Vit D 2000Ius and Ca 1200, LWBE\par - F/u with Endo \par \par Nutrition/GI: Tried Creon, Pancreaze. Now on Pertzye. Trialed Metronidazole 9/2022. Still with Diarrhea/Bloating. Stopped Omeprazole no GERD. Glucerna\par - f/u rd \par - F/u GI Dr Chan gave info again\par \par Sleep- Denies persistent insomnia, no longer needing Ambien.\par \par Urology/Conception: absence of Vas Deferens. Pt reports embryo fertilization and now awaiting genetic analysis. has 3 frozen embryos. 1 implanted Female embryo successful so far (2 male embryos on ice) 4 month old daughter- plan to implant 2nd embryo Nov 23'\par \par HEP: Hx of elevated CK on research labs\par -Abd US 8/2022 wnl \par \par Left Breast mass increased in size U/S Aug 2022 Noted gynecomastia with fibroglandular tissue \par - Referred to Breast Dr Mcgrath \par \par Health Maintenance:\par CXR 8/2022\par Annual labs due at F/U \par \par f/u 2 weeks

## 2023-06-26 NOTE — PHYSICAL EXAM
[General Appearance - Well Developed] : well developed [Normal Appearance] : normal appearance [Well Groomed] : well groomed [General Appearance - Well Nourished] : well nourished [General Appearance - In No Acute Distress] : no acute distress [Normal Conjunctiva] : the conjunctiva exhibited no abnormalities [Normal Oral Mucosa] : normal oral mucosa [Normal Oropharynx] : normal oropharynx [Neck Appearance] : the appearance of the neck was normal [Apical Impulse] : the apical impulse was normal [Heart Rate And Rhythm] : heart rate was normal and rhythm regular [Heart Sounds] : normal S1 and S2 [Murmurs] : no murmurs [Respiration, Rhythm And Depth] : normal respiratory rhythm and effort [Exaggerated Use Of Accessory Muscles For Inspiration] : no accessory muscle use [Bowel Sounds] : normal bowel sounds [Abdomen Soft] : soft [Abdomen Tenderness] : non-tender [Abnormal Walk] : normal gait [Musculoskeletal - Swelling] : no joint swelling seen [Skin Color & Pigmentation] : normal skin color and pigmentation [] : no rash [No Focal Deficits] : no focal deficits [Oriented To Time, Place, And Person] : oriented to person, place, and time [Impaired Insight] : insight and judgment were intact [Affect] : the affect was normal [FreeTextEntry1] : + digital clubbing

## 2023-06-27 ENCOUNTER — APPOINTMENT (OUTPATIENT)
Dept: OPHTHALMOLOGY | Facility: CLINIC | Age: 33
End: 2023-06-27

## 2023-06-27 ENCOUNTER — NON-APPOINTMENT (OUTPATIENT)
Age: 33
End: 2023-06-27

## 2023-06-30 LAB — BACTERIA SPT CF RESP CULT: ABNORMAL

## 2023-07-03 ENCOUNTER — NON-APPOINTMENT (OUTPATIENT)
Age: 33
End: 2023-07-03

## 2023-07-06 ENCOUNTER — NON-APPOINTMENT (OUTPATIENT)
Age: 33
End: 2023-07-06

## 2023-07-12 ENCOUNTER — APPOINTMENT (OUTPATIENT)
Dept: PULMONOLOGY | Facility: CLINIC | Age: 33
End: 2023-07-12

## 2023-07-12 VITALS
SYSTOLIC BLOOD PRESSURE: 123 MMHG | TEMPERATURE: 98 F | HEART RATE: 65 BPM | DIASTOLIC BLOOD PRESSURE: 70 MMHG | WEIGHT: 137 LBS | OXYGEN SATURATION: 99 % | HEIGHT: 65 IN | RESPIRATION RATE: 16 BRPM | BODY MASS INDEX: 22.82 KG/M2

## 2023-07-28 ENCOUNTER — LABORATORY RESULT (OUTPATIENT)
Age: 33
End: 2023-07-28

## 2023-07-28 ENCOUNTER — NON-APPOINTMENT (OUTPATIENT)
Age: 33
End: 2023-07-28

## 2023-07-28 ENCOUNTER — APPOINTMENT (OUTPATIENT)
Dept: PULMONOLOGY | Facility: CLINIC | Age: 33
End: 2023-07-28
Payer: COMMERCIAL

## 2023-07-28 ENCOUNTER — APPOINTMENT (OUTPATIENT)
Dept: PULMONOLOGY | Facility: CLINIC | Age: 33
End: 2023-07-28

## 2023-07-28 VITALS
DIASTOLIC BLOOD PRESSURE: 73 MMHG | HEIGHT: 65 IN | BODY MASS INDEX: 23.16 KG/M2 | TEMPERATURE: 98.6 F | RESPIRATION RATE: 16 BRPM | SYSTOLIC BLOOD PRESSURE: 122 MMHG | HEART RATE: 78 BPM | OXYGEN SATURATION: 98 % | WEIGHT: 139 LBS

## 2023-07-28 DIAGNOSIS — Z00.00 ENCOUNTER FOR GENERAL ADULT MEDICAL EXAMINATION W/OUT ABNORMAL FINDINGS: ICD-10-CM

## 2023-07-28 DIAGNOSIS — E08.9 CYSTIC FIBROSIS WITH OTHER MANIFESTATIONS: ICD-10-CM

## 2023-07-28 DIAGNOSIS — E84.9 CYSTIC FIBROSIS, UNSPECIFIED: ICD-10-CM

## 2023-07-28 DIAGNOSIS — E84.8 CYSTIC FIBROSIS WITH OTHER MANIFESTATIONS: ICD-10-CM

## 2023-07-28 DIAGNOSIS — K86.89 OTHER SPECIFIED DISEASES OF PANCREAS: ICD-10-CM

## 2023-07-28 PROCEDURE — 99215 OFFICE O/P EST HI 40 MIN: CPT

## 2023-07-28 RX ORDER — MULTIVIT-MIN/IRON/FOLIC ACID/K 18-600-40
50 MCG CAPSULE ORAL
Qty: 90 | Refills: 0 | Status: ACTIVE | COMMUNITY
Start: 2022-12-15 | End: 1900-01-01

## 2023-07-28 RX ORDER — NUTRITIONAL SUPPLEMENT/FIBER 0.06 G-1.4
LIQUID (ML) ORAL
Qty: 39000 | Refills: 2 | Status: ACTIVE | COMMUNITY
Start: 2022-06-29 | End: 1900-01-01

## 2023-07-28 RX ORDER — B-COMPLEX WITH VITAMIN C
1250 (500 CA) TABLET ORAL DAILY
Qty: 90 | Refills: 0 | Status: ACTIVE | COMMUNITY
Start: 2023-07-28 | End: 1900-01-01

## 2023-07-28 NOTE — END OF VISIT
[FreeTextEntry3] : I, Dr. Dilcia Wesley, personally performed the evaluation and management (E/M) services for this established patient who presents today with (a) new problem(s)/exacerbation of (an) existing condition(s).  That E/M includes conducting the examination, assessing all new/exacerbated conditions, and establishing a new plan of care.  Today, Christianne Quintana ACP, was here to observe my evaluation and management services for this new problem/exacerbated condition to be followed going forward.\par \par Patient presenting with signs and symptoms of URI/viral syndrome, with increased cough and sputum production, denies fever.  RVP ordered today.  Start course of Bactrim.  Spirometry stable, baseline has ranged between 79 to 81% predicted.  Discussed with the patient regarding inhaled antibiotic for maintenance therapy given that sputum is persistently positive for Burkholderia multi Boran's, MRSA.  Inhaled vancomycin is not covered by his insurance, pending insurance review for MIKE Podhaler coverage.  Alternatively, can consider Cayston 3 times daily which patient is agreeable to and feels that it is feasible.  Patient still needs to see gastroenterology regarding loose stool/diarrhea/bloating refractory to maximize Pertzye enzyme dosing; also having trouble increasing weight although this is contributed by reduced p.o. 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  Encourage patient to increase total sleep time, go to bed an hour earlier if feasible; currently getting 5 to 6 hours TST, no longer with insomnia.\par \par 45 minutes time spent for patient education related to comorbidities and medications, medical records/labs/radiology reviews, preventative care, documentation, coordination of care.\par \par

## 2023-07-28 NOTE — PHYSICAL EXAM
[General Appearance - Well Developed] : well developed [Normal Appearance] : normal appearance [Well Groomed] : well groomed [General Appearance - Well Nourished] : well nourished [General Appearance - In No Acute Distress] : no acute distress [Normal Conjunctiva] : the conjunctiva exhibited no abnormalities [Normal Oral Mucosa] : normal oral mucosa [Normal Oropharynx] : normal oropharynx [Neck Appearance] : the appearance of the neck was normal [Apical Impulse] : the apical impulse was normal [Heart Rate And Rhythm] : heart rate was normal and rhythm regular [Heart Sounds] : normal S1 and S2 [Murmurs] : no murmurs [Respiration, Rhythm And Depth] : normal respiratory rhythm and effort [Exaggerated Use Of Accessory Muscles For Inspiration] : no accessory muscle use [Bowel Sounds] : normal bowel sounds [Abdomen Soft] : soft [Abdomen Tenderness] : non-tender [Musculoskeletal - Swelling] : no joint swelling seen [Abnormal Walk] : normal gait [Skin Color & Pigmentation] : normal skin color and pigmentation [] : no rash [No Focal Deficits] : no focal deficits [Oriented To Time, Place, And Person] : oriented to person, place, and time [Impaired Insight] : insight and judgment were intact [Affect] : the affect was normal [FreeTextEntry1] : + digital clubbing

## 2023-07-28 NOTE — HISTORY OF PRESENT ILLNESS
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 [Pseudomonas] : Pseudomonas [Other: ___] : [unfilled] [___] : the last positive Pseudomonas result was [unfilled] [Last AFB Date: ___] : the AFB was performed  [unfilled] [None] : ~He/She~ has no hemoptysis [FVC ___] : the FVC was [unfilled] liters [FEV1: ___] : the FEV1 was [unfilled] liters [] : vest daily [Other ___] : exercise [unfilled] [Fair] : fair [Pancreatic Insufficiency] : pancreatic insufficiency [Pancreatic Enzyme Supp.] : uses pancreatic enzyme supplements [CFRD] : CFRD [Date: ___] : The last HgA1C was performed on [unfilled] [HgA1C Value: ___] : HgA1C value was [unfilled]  [AFB] : the patient had a MAC negative AFB [Headache] : no headache [Congestion] : no nasal congestion [Sinus Pain] : no sinus pain [Pancreatitis] : no pancreatitis [Diarrhea] : no diarrhea [Constipation] : no constipation [Steatorrhea] : no steatorrhea [de-identified] : 33 y/o male CF pt who was dx'ed CF at 6 mo old, + sweat, + 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 Pt also has chronic sinusitis and PI. Hx of COVID-19 12/2021. \par \par Seen today in office for  [id number] open label. Pt is reporting 4 days of increased chest congestion, increased thicker mucous darker green in color, sinus congestion. Also feeling fatigued for a few weeks. Not using nebs/act. Baby is 5.5 months old. Using DEXCOM with Omni pod reporting improvement in FSBGs but trending a little higher recently, no hypoglycemic episodes. Appetite is good but endorsing persistent bloating, soft/oily stools will little improvement since on Pertzye.  No change cough, hemoptysis, fevers, chills, sob, wheeze, cp, chest tightness, abd pain, flatulence, nausea, sore throat, ear pain. No known sick contacts. Plan to implant Boy embryo in Nov 23'\par \par PULM: Alb/HS/Pulmozyme + Vest rarely\par Was previously on inhaled Vanco and Cayston with some benefit while off of modulator therapy for sperm extraction in past. (High out of pocket costs not covered) \par Self stopped Advair months ago \par \par Nutrition: Using Glucerna once a day.  (Takes 4 enzymes with each shake) used to use KF\par GI: chronic bloating resolved with Metronidazole for short time, no GERD off PPI, intermittent loose stools. Still hasn’t seen GI\par Left breast mass - US showed fibroglandular tissue noted gynecomastia

## 2023-07-28 NOTE — ASSESSMENT
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 Pt also has chronic sinusitis and PI. 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 Here today for clinic and research visit with 4 days of pulm/sinus complaints. \par \par Pt seen for [id number] All protocols and procedures explained. 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 \par - Pt was on inhaled vanco (not covered $$) /cayston prior to sperm extraction with good tolerance.\par Tolerated Bethkis and tobramycin in past, did not tolerate TOBIpod.\par - Sputum CS swab today \par - RVP today \par - Discussed importance of inhaled antibx MIKE pod appeal pending\par - Start Cayston TID QOM (should be on at least one inh antibx given persistent cultures of Burkholderia and MRSA)\par - Bactrim -160 BID x 14 days. Take with probiotic (sent rx)\par \par CARDIO Echo wnl mild mitral regurg 2020\par - EKG had elevated polarization in pre-cordial leads cont to monitor \par \par ENDO - CFRD on insulin. Reports glucose levels of 150-200s post meals. Hypovitaminosis D. Few hypoglycemic events. OSTEOPENIA not taking Vit D / Ca \par - Repeat DEXA due 9/2024\par - Resume Vit D 2000Ius and Ca 1200, LWBE Sent rxs\par - F/u with Endo \par \par Nutrition/GI: Tried Creon, Pancreaze. Now on Pertzye. Trialed Metronidazole 9/2022. Still with Diarrhea/Bloating. Stopped Omeprazole no GERD. Glucerna\par - f/u rd \par - F/u GI Dr Chan gave info again\par - Sent Rx for Gratafy and zone bars \par - Gave copay card for pertzye \par \par Sleep- Denies persistent insomnia, no longer needing Ambien.\par \par Urology/Conception: absence of Vas Deferens. Pt reports embryo fertilization and now awaiting genetic analysis. has 3 frozen embryos. 1 implanted Female embryo successful so far (2 male embryos on ice) 4 month old daughter- plan to implant 2nd embryo Nov 23'\par \par Hx of elevated CK on research labs\par -Abd US 8/2022 wnl \par - CK re-drawn with research labs today \par \par Left Breast mass increased in size U/S Aug 2022 Noted gynecomastia with fibroglandular tissue \par - Referred to Breast Dr Mcgrath \par \par Health Maintenance:\par CXR 8/2022\par Annual labs drawn today\par \par Pt interested in CFRD Jaeb study pending site selection \par \par f/u 3 months

## 2023-08-03 LAB
25(OH)D3 SERPL-MCNC: 29.6 NG/ML
A FLAVUS AB FLD QL: NEGATIVE
A FUMIGATUS AB FLD QL: NEGATIVE
A FUMIGATUS IGE QN: <0.1 KUA/L
A NIGER AB FLD QL: NEGATIVE
A-TOCOPHEROL VIT E SERPL-MCNC: 6.2 MG/L
ALBUMIN SERPL ELPH-MCNC: 4.8 G/DL
ALP BLD-CCNC: 102 U/L
ALT SERPL-CCNC: 41 U/L
ANION GAP SERPL CALC-SCNC: 12 MMOL/L
APPEARANCE: CLEAR
APTT BLD: 32.3 SEC
AST SERPL-CCNC: 26 U/L
BACTERIA SPT CF RESP CULT: ABNORMAL
BETA+GAMMA TOCOPHEROL SERPL-MCNC: 1.2 MG/L
BILIRUB SERPL-MCNC: 0.7 MG/DL
BILIRUBIN URINE: NEGATIVE
BLOOD URINE: NEGATIVE
BUN SERPL-MCNC: 17 MG/DL
CALCIUM SERPL-MCNC: 9.7 MG/DL
CHLORIDE SERPL-SCNC: 99 MMOL/L
CHOLEST SERPL-MCNC: 143 MG/DL
CO2 SERPL-SCNC: 25 MMOL/L
COLOR: YELLOW
CREAT SERPL-MCNC: 0.86 MG/DL
CREAT SPEC-SCNC: 122 MG/DL
DEPRECATED A FUMIGATUS IGE RAST QL: 0
EGFR: 119 ML/MIN/1.73M2
ESTIMATED AVERAGE GLUCOSE: 157 MG/DL
GLUCOSE QUALITATIVE U: 500 MG/DL
GLUCOSE SERPL-MCNC: 258 MG/DL
HBA1C MFR BLD HPLC: 7.1 %
HDLC SERPL-MCNC: 44 MG/DL
INR PPP: 1.01 RATIO
KETONES URINE: NEGATIVE MG/DL
LDLC SERPL CALC-MCNC: 86 MG/DL
LEUKOCYTE ESTERASE URINE: NEGATIVE
MENADIONE SERPL-MCNC: <0.1 NG/ML
MICROALBUMIN 24H UR DL<=1MG/L-MCNC: <1.2 MG/DL
MICROALBUMIN/CREAT 24H UR-RTO: NORMAL MG/G
NITRITE URINE: NEGATIVE
NONHDLC SERPL-MCNC: 99 MG/DL
PH URINE: 5
POTASSIUM SERPL-SCNC: 4.7 MMOL/L
PROT SERPL-MCNC: 7.7 G/DL
PROTEIN URINE: NEGATIVE MG/DL
PT BLD: 11.4 SEC
RAPID RVP RESULT: NOT DETECTED
SARS-COV-2 RNA PNL RESP NAA+PROBE: NOT DETECTED
SODIUM SERPL-SCNC: 136 MMOL/L
SPECIFIC GRAVITY URINE: 1.02
TOTAL IGE SMQN RAST: 28 KU/L
TRIGL SERPL-MCNC: 65 MG/DL
UROBILINOGEN URINE: 0.2 MG/DL

## 2023-08-04 LAB — VIT A SERPL-MCNC: 36.8 UG/DL

## 2023-08-11 ENCOUNTER — NON-APPOINTMENT (OUTPATIENT)
Age: 33
End: 2023-08-11

## 2023-08-23 ENCOUNTER — APPOINTMENT (OUTPATIENT)
Dept: PULMONOLOGY | Facility: CLINIC | Age: 33
End: 2023-08-23

## 2023-08-23 VITALS
DIASTOLIC BLOOD PRESSURE: 84 MMHG | OXYGEN SATURATION: 100 % | WEIGHT: 141 LBS | BODY MASS INDEX: 23.49 KG/M2 | HEART RATE: 62 BPM | HEIGHT: 65 IN | SYSTOLIC BLOOD PRESSURE: 127 MMHG | RESPIRATION RATE: 16 BRPM | TEMPERATURE: 98.2 F

## 2023-09-05 NOTE — ASU PREOP CHECKLIST - SPO2 (%)
[facility] Hospitalist Progress Note    Subjective      Likes therapies  No cp/sob  No n/v    I/O's    Intake/Output Summary (Last 24 hours) at 9/4/2023 2135  Last data filed at 9/4/2023 1230  Gross per 24 hour   Intake 480 ml   Output 550 ml   Net -70 ml       Last Recorded Vitals  Vitals with min/max:      Vital Last Value 24 Hour Range   Temperature 97.9 °F (36.6 °C) (09/04/23 1540) Temp  Min: 97.7 °F (36.5 °C)  Max: 97.9 °F (36.6 °C)   Pulse 86 (09/04/23 1540) Pulse  Min: 65  Max: 86   Respiratory 18 (09/04/23 1540) Resp  Min: 16  Max: 18   Non-Invasive  Blood Pressure 97/61 (09/04/23 1540) BP  Min: 97/61  Max: 113/76   Pulse Oximetry 98 % (09/04/23 1540) SpO2  Min: 96 %  Max: 98 %   Arterial   Blood Pressure   No data recorded      Body mass index is 39.94 kg/m².    ROS  10-point review of systems is negative except otherwise as stated above    Physical Exam:  GENERAL:  In no apparent distress  HEENT: normocephalic, atraumatic, pupils reactive to light, conjunctiva is normal. Ear lobes normal, nose normal, throat moist no masses  Neck: Supple no JVD, no thyroid palpable, no bruit  CHEST:  Symmetric on expansion  LUNGS:  with clear breath sounds bilaterally.   CARDIAC:  Regular rate and rhythm.  S1 and S2 + murmur   ABDOMEN:  Soft, without detectable tenderness.  No sign of distention.  No   rebound or guarding, and no masses palpated.  EXTREMITIES::  Good range of motion of all major joints. no calf tenderness       BKA of the left leg, dressing in place  NEUROLOGIC: AAOX3 no acute distress, normal tone, normal sensitivity, motor force, reflex normal.  PSYCH:  no depression or anxiety   Skin. Multiple tattoos     Labs     No results for input(s): \"WBC\", \"RBC\", \"HGB\", \"HCT\", \"PLT\", \"MCV\", \"MCH\", \"MCHC\", \"NRBCRE\", \"NEUT\", \"BAND\", \"LYMP\", \"MON\", \"EOS\", \"BASO\", \"ASEG\", \"ALYMP\", \"AMONO\", \"AEO\", \"ABAS\", \"RMOR\", \"PMOR\" in the last 72 hours.      No results for input(s): \"SODIUM\", \"POTASSIUM\", \"MG\", \"PHOS\", \"CO2\",  \"ANIONGAP\", \"GLUCOSE\", \"BUN\", \"CREATININE\", \"GFRA\", \"GFRNA\", \"BCRAT\", \"CALCIUM\", \"BILIRUBIN\", \"AST\", \"GPT\", \"ALKPT\", \"GLOB\", \"AGR\", \"LDH\", \"NTPROBNP\" in the last 72 hours.    Invalid input(s): \"CHOLORIDE\", \"TOTPROTIEIN\", \"ALCIUM\"     No results found     No results for input(s): \"INR\", \"PT\", \"PTT\" in the last 72 hours.     Imaging  No orders to display          Echo:  No valid procedures specified.     Assessment/Plan  Scott Beckett is a 60 year old male w/PMH diabetes mellitus, hypertension, hyperlipidemia, left leg osteomyelitis post left BKA, peripheral vascular disease who has been sent to the acute rehab for physical therapy.    * Status post below-knee amputation of left lower extremity (CMD)  Assessment & Plan  · S/P  below the knee amputation on 8/16 due to osteomyelitis  · PT wound care for compression wrapping/edema management  · No weightbearing to the left lower leg  · Monitor left BKA incision  · PT/OT as tolerated  · Pain control       Diabetes mellitus (CMD)  Increased metformin to 1000 mg bid   Stop iss   Diabetic diet  accuchecks  bid     moderate to severe MS on recent echo    Will need outpatient f/u with cardiology after discharge      Sleep apnea  Assessment & Plan  · Continue with the CPAP    Urinary retention  Assessment & Plan  · Not having any more urinary retention, PVR has been negative    Hyperlipidemia  Assessment & Plan  · Continue with the Lipitor 20 mg p.o. daily    Hypertension       Stopped losartan as bp well controlled off of it  monitor bp closely    PAD (peripheral artery disease) (CMD)  Assessment & Plan  · Continue with Lipitor  · Seems to be stable            Current Facility-Administered Medications   Medication Dose Route Frequency Provider Last Rate Last Admin   • metFORMIN (GLUCOPHAGE) tablet 1,000 mg  1,000 mg Oral BID  Titus Sanches MD   1,000 mg at 09/04/23 1715   • aspirin (ECOTRIN) enteric coated tablet 81 mg  81 mg Oral Daily Angelique Gibbons MD   81 mg at 09/04/23  0814   • insulin lispro (ADMELOG,HumaLOG) - Correction Dose   Subcutaneous BID AC Titus Sanches MD   1 Units at 09/02/23 1658   • atorvastatin (LIPITOR) tablet 20 mg  20 mg Oral QHS Jolene Mcrae MD   20 mg at 09/04/23 2020   • enoxaparin (LOVENOX) injection 40 mg  40 mg Subcutaneous Q12H Jolene Mcrae MD   40 mg at 09/04/23 2020     Current Facility-Administered Medications   Medication Dose Route Frequency Provider Last Rate Last Admin   • bismuth subsalicylate (PEPTO-BISMOL) chewable tablet 524 mg  524 mg Oral 4x Daily PRN Angelique Gibbons MD   524 mg at 09/04/23 2020   • ibuprofen (MOTRIN) tablet 400 mg  400 mg Oral Q6H PRN Angelique Gibbons MD       • polyethylene glycol (MIRALAX) packet 17 g  17 g Oral Daily PRN Jung Menchaca, APNP       • docusate sodium (ENEMEEZ MINI) 283 MG rectal enema 283 mg  283 mg Rectal Daily PRN Jung Menchaca APNP       • melatonin tablet 3 mg  3 mg Oral Nightly PRN Jung Menchaca APNP       • HYDROcodone-acetaminophen (NORCO) 5-325 MG per tablet 1 tablet  1 tablet Oral Q4H PRN Jung Menchaca APNP   1 tablet at 09/03/23 2021   • bisacodyl (DULCOLAX) suppository 10 mg  10 mg Rectal Daily PRN Jolene Mcrae MD       • calcium carbonate (TUMS) chewable tablet 1,000 mg  1,000 mg Oral Q4H PRN Jolene Mcrae MD       • dextrose (GLUTOSE) 40 % gel 15 g  15 g Oral PRN Jolene Mcrae MD       • dextrose (GLUTOSE) 40 % gel 30 g  30 g Oral PRN Jolene Mcrae MD       • dextrose 50 % injection 12.5 g  12.5 g Intravenous PRN Jolene Mcrae MD       • dextrose 50 % injection 25 g  25 g Intravenous PRN Jolene Mcrae MD       • diphenhydrAMINE (BENADRYL) capsule 25 mg  25 mg Oral Q6H PRN Jolene Mcrae MD       • docusate sodium-sennosides (SENOKOT S) 50-8.6 MG 2 tablet  2 tablet Oral BID PRN Jolene Mcrae MD       • glucagon (GLUCAGEN) injection 1 mg  1 mg Intramuscular PRN Jolene Mcrae MD       • HYDROcodone-acetaminophen (NORCO)  MG per tablet 1 tablet  1 tablet Oral  Q4H PRN Jolene Mcrae MD   1 tablet at 08/27/23 2008   • magnesium hydroxide (MILK OF MAGNESIA) 400 MG/5ML suspension 30 mL  30 mL Oral Daily PRN Jolene Mcrae MD       • ondansetron (ZOFRAN ODT) disintegrating tablet 4 mg  4 mg Oral Q12H PRN Jolene Mcrae MD        Or   • ondansetron (ZOFRAN) injection 4 mg  4 mg Intravenous Q12H PRN Jolene Mcrae MD           DVT Prophylaxis  Current Active Medications for DVT Prophylaxis (From admission, onward)         Stop     enoxaparin (LOVENOX) injection 40 mg  40 mg,   Subcutaneous,   EVERY 12 HOURS         --                   99

## 2023-09-13 LAB — RHODAMINE-AURAMINE STN SPEC: NORMAL

## 2023-09-15 ENCOUNTER — NON-APPOINTMENT (OUTPATIENT)
Age: 33
End: 2023-09-15

## 2023-09-29 ENCOUNTER — NON-APPOINTMENT (OUTPATIENT)
Age: 33
End: 2023-09-29

## 2023-10-11 ENCOUNTER — APPOINTMENT (OUTPATIENT)
Dept: PULMONOLOGY | Facility: CLINIC | Age: 33
End: 2023-10-11

## 2023-10-11 VITALS
BODY MASS INDEX: 23.16 KG/M2 | OXYGEN SATURATION: 100 % | HEART RATE: 60 BPM | WEIGHT: 139 LBS | TEMPERATURE: 97.34 F | DIASTOLIC BLOOD PRESSURE: 81 MMHG | HEIGHT: 65 IN | SYSTOLIC BLOOD PRESSURE: 124 MMHG | RESPIRATION RATE: 16 BRPM

## 2023-10-17 ENCOUNTER — APPOINTMENT (OUTPATIENT)
Dept: ENDOCRINOLOGY | Facility: CLINIC | Age: 33
End: 2023-10-17

## 2023-10-27 RX ORDER — INSULIN LISPRO 100 [IU]/ML
100 INJECTION, SOLUTION INTRAVENOUS; SUBCUTANEOUS
Qty: 8 | Refills: 3 | Status: ACTIVE | COMMUNITY
Start: 2023-04-10 | End: 1900-01-01

## 2023-11-13 NOTE — PATIENT PROFILE ADULT. - SOURCE OF INFORMATION, PROFILE
Patient overdue for MAS   Please assist with scheduling     Protocol failed    Lisinopril-hydrochlorothiazide 20-12.5mg     LOV: 4/11/23   RTC: 6 months  Filled: 4/6/23 # 180 1 refill   Labs: 4/7/23   No future appointments. chart(s)/patient

## 2023-11-15 ENCOUNTER — NON-APPOINTMENT (OUTPATIENT)
Age: 33
End: 2023-11-15

## 2023-12-18 ENCOUNTER — APPOINTMENT (OUTPATIENT)
Dept: PULMONOLOGY | Facility: CLINIC | Age: 33
End: 2023-12-18

## 2023-12-18 VITALS
DIASTOLIC BLOOD PRESSURE: 77 MMHG | HEART RATE: 68 BPM | HEIGHT: 65 IN | OXYGEN SATURATION: 99 % | SYSTOLIC BLOOD PRESSURE: 145 MMHG | WEIGHT: 135 LBS | RESPIRATION RATE: 16 BRPM | TEMPERATURE: 210.02 F | BODY MASS INDEX: 22.49 KG/M2

## 2023-12-18 VITALS — DIASTOLIC BLOOD PRESSURE: 80 MMHG | SYSTOLIC BLOOD PRESSURE: 128 MMHG

## 2023-12-18 VITALS — TEMPERATURE: 98.9 F

## 2023-12-18 RX ORDER — EPINEPHRINE 0.3 MG/.3ML
0.3 INJECTION INTRAMUSCULAR
Qty: 1 | Refills: 2 | Status: DISCONTINUED | COMMUNITY
Start: 2021-02-08 | End: 2023-12-18

## 2023-12-18 RX ORDER — NYSTATIN 100000 [USP'U]/ML
100000 SUSPENSION ORAL 3 TIMES DAILY
Qty: 150 | Refills: 1 | Status: DISCONTINUED | COMMUNITY
Start: 2023-10-02 | End: 2023-12-18

## 2023-12-18 RX ORDER — NYSTATIN 100000 [USP'U]/G
100000 CREAM TOPICAL 3 TIMES DAILY
Qty: 1 | Refills: 0 | Status: DISCONTINUED | COMMUNITY
Start: 2023-10-02 | End: 2023-12-18

## 2023-12-18 RX ORDER — AZTREONAM 75 MG/ML
75 KIT INHALATION
Qty: 1 | Refills: 3 | Status: ACTIVE | COMMUNITY
Start: 2023-07-28 | End: 1900-01-01

## 2023-12-18 RX ORDER — ELECTROLYTES/DEXTROSE
SOLUTION, ORAL ORAL DAILY
Qty: 60 | Refills: 0 | Status: DISCONTINUED | COMMUNITY
Start: 2023-07-28 | End: 2023-12-18

## 2023-12-18 RX ORDER — ELEXACAFTOR, TEZACAFTOR, AND IVACAFTOR 100-50-75
100-50-75 & 150 KIT ORAL
Qty: 1 | Refills: 6 | Status: ACTIVE | COMMUNITY
Start: 2021-08-09

## 2023-12-18 RX ORDER — SULFAMETHOXAZOLE AND TRIMETHOPRIM 800; 160 MG/1; MG/1
800-160 TABLET ORAL
Qty: 28 | Refills: 0 | Status: DISCONTINUED | COMMUNITY
Start: 2023-07-28 | End: 2023-12-18

## 2023-12-18 NOTE — ASSESSMENT
[FreeTextEntry1] : 32 y/o male CF pt who was dx'ed CF at 6 mo old, + sweat, + 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 Pt also has chronic sinusitis and PI. 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 Here today for research visit with Pulm Exacerbation and right otitis media.   Pt seen for [id number] All protocols and procedures explained. Pt would like to continue to participate in the study.  PULM: Sinopulmonary exacerbation - Fev1 74% (12/18/23), 81% (7/28/23), 81% (6/26/23), 79% (12/15/22), 82% (7/29/22), 79% (6/29/22) 76% (home 6/29/22), 84% (9/20/21), 93%, 76% (home aisha 6/1/21), 59% (5/25/21), 78% (3/30/21 off study drug), 84% (1/8/20), 78% (10/16/20), 81% (7/21/20), 79% (1/13/20), 81% (12/11/19), 81% (11/26/19), 81% (11/13/19), 84% (6/2019)  CXR:10/10/20 Reticular opacities and bronchiectasis slight improvement in L mid to lower lung periphery but other wise no significant internal change  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 - Pt was on inhaled vanco (not covered $$) /cayston prior to sperm extraction with good tolerance. Tolerated Bethkis and tobramycin in past, did not tolerate TOBIpod - not covered - Sputum CS cup today - RVP today - Discussed importance of inhaled antibx and resuming Cayston if covered. - reStart Cayston TID QOM (should be on at least one inh antibx given persistent cultures of Burkholderia and MRSA) - Start Levofloxacin 750mg PO x 14 days.   CARDIO Echo wnl mild mitral regurg 2020 - EKG had elevated polarization in pre-cordial leads cont to monitor  ENDO - CFRD on insulin. Reports glucose levels of 150-200s post meals. Hypovitaminosis D. Few hypoglycemic events. OSTEOPENIA not taking Vit D / Ca - Repeat DEXA due 9/2024 - Resume Vit D 2000Ius and Ca 1200, LWBE Sent rxs - F/u with Endo  Nutrition/GI: Tried Creon, Pancreaze. Now on Pertzye. Trialed Metronidazole 9/2022. Still with Diarrhea/Bloating. Stopped Omeprazole no GERD. Glucerna - f/u rd - F/u GI Dr Chan apt march 24' given Dr Crowley (for possible sooner apt)  - Sent Rx for OPHTHONIX and zone bars - Gave copay card for pertzye  Sleep- Denies persistent insomnia, no longer needing Ambien.  Urology/Conception: absence of Vas Deferens. Pt reports embryo fertilization and now awaiting genetic analysis. has 3 frozen embryos. 1 implanted Female embryo successful so far (2 male embryos on ice) 4 month old daughter- plan to implant 2nd embryo Feb 24''  Hx of elevated CK on research labs -Abd US 8/2022 wnl - CK re-drawn with research labs today  Left Breast mass increased in size U/S Aug 2022 Noted gynecomastia with fibroglandular tissue. per pt resolved - Referred to Breast Dr Mcgrath never followed up  Health Maintenance: CXR 8/2022  f/u 3 months

## 2023-12-18 NOTE — HISTORY OF PRESENT ILLNESS
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 [Pseudomonas] : Pseudomonas [Other: ___] : [unfilled] [___] : the last positive Pseudomonas result was [unfilled] [Last AFB Date: ___] : the AFB was performed  [unfilled] [None] : ~He/She~ has no hemoptysis [FVC ___] : the FVC was [unfilled] liters [FEV1: ___] : the FEV1 was [unfilled] liters [] : vest daily [Other ___] : exercise [unfilled] [Fair] : fair [Pancreatic Insufficiency] : pancreatic insufficiency [Pancreatic Enzyme Supp.] : uses pancreatic enzyme supplements [CFRD] : CFRD [Date: ___] : The last HgA1C was performed on [unfilled] [HgA1C Value: ___] : HgA1C value was [unfilled]  [AFB] : the patient had a MAC negative AFB [Headache] : no headache [Congestion] : no nasal congestion [Sinus Pain] : no sinus pain [Pancreatitis] : no pancreatitis [Diarrhea] : no diarrhea [Constipation] : no constipation [Steatorrhea] : no steatorrhea [de-identified] : 32 y/o male CF pt who was dx'ed CF at 6 mo old, + sweat, + 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 Pt also has chronic sinusitis and PI. Hx of COVID-19 12/2021.   Seen today in office for  [id number] open label. Pt reporting has been sick since 11/23 was around baby with RSV, saw his PCP on telehealth last week and was prescribed zpack without much improvement last dose today. Started in sinuses but has since moved to his chest. + cough, congestion, sputum, thick green, feverish but no temp, left ear pain. Chronic intermittent loose stools pending apt with Dr Chan. No sob, wheeze, nausea vomiting. Has not been doing airway clearance or inhaled antibx. Has been off for months because of lapse in insurance again. Not monitoring BGs but administering insulin. Reportedly went to [facility] for Left Cornea abrasion 11/18 from xmas decoration was prescribed oral Doxy 50mg, Neomycin/Poly/Dex drops, Ofloxacin drops, prednisolone drops and a clear lens. Has since had improvement in injury has f/u with ophthalmology. Weight loss with acute illness and continued loose BMs.   Plan to implant Boy embryo in Feb 24''  PULM: Alb/HS/Pulmozyme + Vest  Was previously on inhaled Vanco and [place] with some benefit while off of modulator therapy for sperm extraction in past. (High out of pocket costs not covered)  Self stopped Advair months ago   Nutrition: Taking 1 Glucerna or [employer].  (Takes 4 enzymes with each shake) GI: chronic bloating resolved with Metronidazole for short time, no GERD off PPI, intermittent loose stools. Left breast mass - US showed fibroglandular tissue noted gynecomastia. Reportedly resolved.

## 2023-12-19 LAB
RAPID RVP RESULT: NOT DETECTED
SARS-COV-2 RNA PNL RESP NAA+PROBE: NOT DETECTED

## 2023-12-27 LAB — BACTERIA SPT CF RESP CULT: ABNORMAL

## 2024-01-05 ENCOUNTER — APPOINTMENT (OUTPATIENT)
Dept: GASTROENTEROLOGY | Facility: CLINIC | Age: 34
End: 2024-01-05

## 2024-01-10 ENCOUNTER — NON-APPOINTMENT (OUTPATIENT)
Age: 34
End: 2024-01-10

## 2024-01-11 ENCOUNTER — APPOINTMENT (OUTPATIENT)
Dept: PULMONOLOGY | Facility: CLINIC | Age: 34
End: 2024-01-11

## 2024-01-24 RX ORDER — NEBULIZER
EACH MISCELLANEOUS
Qty: 1 | Refills: 0 | Status: ACTIVE | COMMUNITY
Start: 2024-01-24 | End: 1900-01-01

## 2024-01-25 RX ORDER — NEBULIZER
EACH MISCELLANEOUS
Qty: 1 | Refills: 0 | Status: ACTIVE | COMMUNITY
Start: 2023-07-28 | End: 1900-01-01

## 2024-02-22 ENCOUNTER — NON-APPOINTMENT (OUTPATIENT)
Age: 34
End: 2024-02-22

## 2024-03-06 ENCOUNTER — APPOINTMENT (OUTPATIENT)
Dept: PULMONOLOGY | Facility: CLINIC | Age: 34
End: 2024-03-06

## 2024-03-08 VITALS
TEMPERATURE: 98.1 F | SYSTOLIC BLOOD PRESSURE: 129 MMHG | OXYGEN SATURATION: 100 % | HEART RATE: 63 BPM | RESPIRATION RATE: 16 BRPM | BODY MASS INDEX: 22.66 KG/M2 | DIASTOLIC BLOOD PRESSURE: 76 MMHG | WEIGHT: 136 LBS | HEIGHT: 65 IN

## 2024-03-13 ENCOUNTER — APPOINTMENT (OUTPATIENT)
Dept: GASTROENTEROLOGY | Facility: CLINIC | Age: 34
End: 2024-03-13

## 2024-04-04 ENCOUNTER — NON-APPOINTMENT (OUTPATIENT)
Age: 34
End: 2024-04-04

## 2024-04-30 ENCOUNTER — RX RENEWAL (OUTPATIENT)
Age: 34
End: 2024-04-30

## 2024-04-30 RX ORDER — INSULIN PMP CART,AUT,G6/7,CNTR
EACH SUBCUTANEOUS
Qty: 30 | Refills: 1 | Status: ACTIVE | COMMUNITY
Start: 2023-04-04 | End: 1900-01-01

## 2024-05-02 ENCOUNTER — NON-APPOINTMENT (OUTPATIENT)
Age: 34
End: 2024-05-02

## 2024-05-10 ENCOUNTER — NON-APPOINTMENT (OUTPATIENT)
Age: 34
End: 2024-05-10

## 2024-05-10 RX ORDER — LEVOFLOXACIN 750 MG/1
750 TABLET, FILM COATED ORAL DAILY
Qty: 5 | Refills: 0 | Status: DISCONTINUED | COMMUNITY
Start: 2023-12-18 | End: 2024-05-10

## 2024-05-10 RX ORDER — DOXYCYCLINE HYCLATE 100 MG/1
100 TABLET ORAL
Qty: 28 | Refills: 0 | Status: ACTIVE | COMMUNITY
Start: 2024-05-10 | End: 1900-01-01

## 2024-05-29 RX ORDER — PEN NEEDLE, DIABETIC 32 GX 1/4"
32G X 6 MM NEEDLE, DISPOSABLE MISCELLANEOUS
Qty: 400 | Refills: 1 | Status: ACTIVE | COMMUNITY
Start: 2019-03-29 | End: 1900-01-01

## 2024-05-31 ENCOUNTER — APPOINTMENT (OUTPATIENT)
Dept: PULMONOLOGY | Facility: CLINIC | Age: 34
End: 2024-05-31

## 2024-06-05 ENCOUNTER — APPOINTMENT (OUTPATIENT)
Dept: PULMONOLOGY | Facility: CLINIC | Age: 34
End: 2024-06-05

## 2024-06-05 VITALS
RESPIRATION RATE: 16 BRPM | BODY MASS INDEX: 22.66 KG/M2 | WEIGHT: 136 LBS | HEIGHT: 65 IN | HEART RATE: 66 BPM | OXYGEN SATURATION: 99 % | SYSTOLIC BLOOD PRESSURE: 138 MMHG | DIASTOLIC BLOOD PRESSURE: 82 MMHG | TEMPERATURE: 98.3 F

## 2024-06-19 NOTE — REVIEW OF SYSTEMS
Department of Anesthesiology  Preprocedure Note       Name:  Paige Barahona   Age:  59 y.o.  :  1965                                          MRN:  [id number]         Date:  2024      Surgeon: Surgeon(s):  Chau Banuelos MD    Procedure: Procedure(s):  TOTAL LEFT KNEE ARTHROPLASTY BISHOP WITH C-ARM    Medications prior to admission:   Prior to Admission medications    Medication Sig Start Date End Date Taking? Authorizing Provider   venlafaxine (EFFEXOR XR) 75 MG extended release capsule Take 1 capsule by mouth daily    Igor Chapman MD   polyethylene glycol (GLYCOLAX) 17 GM/SCOOP powder Take 17 g by mouth daily    Igor Chapman MD   loratadine (CLARITIN) 10 MG tablet Take 1 tablet by mouth daily    gIor Chapman MD   montelukast (SINGULAIR) 10 MG tablet Take 1 tablet by mouth nightly    Igor Chapman MD   fluticasone (FLONASE) 50 MCG/ACT nasal spray 1 spray by Each Nostril route daily    Igor Chapman MD       Current medications:    Current Facility-Administered Medications   Medication Dose Route Frequency Provider Last Rate Last Admin   • lactated ringers IV soln infusion   IntraVENous Continuous Chau Banuelos  mL/hr at 24 0909 New Bag at 24 0909   • ceFAZolin (ANCEF) 2,000 mg in sterile water 20 mL IV syringe  2,000 mg IntraVENous On Call to OR Chau Banuelos MD       • tranexamic acid-NaCl IVPB premix 1,000 mg  1,000 mg IntraVENous On Call to OR Chau Banuelos MD       • acetaminophen (TYLENOL) tablet 1,000 mg  1,000 mg Oral Once Chau Banuelos MD       • sodium chloride flush 0.9 % injection 5-40 mL  5-40 mL IntraVENous 2 times per day Chau Banuelos MD       • sodium chloride flush 0.9 % injection 5-40 mL  5-40 mL IntraVENous PRN Chau Banuelos MD       • 0.9 % sodium chloride infusion   IntraVENous PRN Chau Banuelos MD       • fentaNYL (SUBLIMAZE) 100 MCG/2ML injection            •  [Earache] : earache [Cough] : cough [PND] : PND [see HPI] : see HPI [Negative] : Heme/Lymph [Shortness Of Breath] : no shortness of breath [Wheezing] : no wheezing [SOB on Exertion] : no shortness of breath during exertion

## 2024-06-24 RX ORDER — SYRING-NEEDL,DISP,INSUL,0.3 ML 31 G X1/4"
31G X 1/4" SYRINGE, EMPTY DISPOSABLE MISCELLANEOUS
Qty: 90 | Refills: 3 | Status: ACTIVE | COMMUNITY
Start: 2023-10-27 | End: 1900-01-01

## 2024-06-25 RX ORDER — INSULIN LISPRO 100 [IU]/ML
100 INJECTION, SOLUTION INTRAVENOUS; SUBCUTANEOUS
Qty: 1 | Refills: 2 | Status: ACTIVE | COMMUNITY
Start: 2022-02-24 | End: 1900-01-01

## 2024-07-18 ENCOUNTER — NON-APPOINTMENT (OUTPATIENT)
Age: 34
End: 2024-07-18

## 2024-08-22 ENCOUNTER — LABORATORY RESULT (OUTPATIENT)
Age: 34
End: 2024-08-22

## 2024-08-22 ENCOUNTER — APPOINTMENT (OUTPATIENT)
Dept: PULMONOLOGY | Facility: CLINIC | Age: 34
End: 2024-08-22

## 2024-08-22 VITALS
SYSTOLIC BLOOD PRESSURE: 128 MMHG | HEART RATE: 60 BPM | WEIGHT: 134 LBS | TEMPERATURE: 98.2 F | OXYGEN SATURATION: 100 % | HEIGHT: 65 IN | DIASTOLIC BLOOD PRESSURE: 82 MMHG | BODY MASS INDEX: 22.33 KG/M2 | RESPIRATION RATE: 16 BRPM

## 2024-08-22 DIAGNOSIS — H66.92 OTITIS MEDIA, UNSPECIFIED, LEFT EAR: ICD-10-CM

## 2024-08-22 RX ORDER — PANCRELIPASE LIPASE, PANCRELIPASE PROTEASE, PANCRELIPASE AMYLASE 40000; 126000; 168000 [USP'U]/1; [USP'U]/1; [USP'U]/1
40000-126000 CAPSULE, DELAYED RELEASE ORAL
Refills: 0 | Status: ACTIVE | COMMUNITY
Start: 2024-08-22

## 2024-08-22 RX ORDER — AMOXICILLIN AND CLAVULANATE POTASSIUM 875; 125 MG/1; MG/1
875-125 TABLET, COATED ORAL
Qty: 20 | Refills: 0 | Status: ACTIVE | COMMUNITY
Start: 2024-08-22 | End: 1900-01-01

## 2024-08-22 NOTE — HISTORY OF PRESENT ILLNESS
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 [Pseudomonas] : Pseudomonas [Other: ___] : [unfilled] [___] : the last positive Pseudomonas result was [unfilled] [Last AFB Date: ___] : the AFB was performed  [unfilled] [None] : ~He/She~ has no hemoptysis [FVC ___] : the FVC was [unfilled] liters [FEV1: ___] : the FEV1 was [unfilled] liters [] : vest daily [Other ___] : exercise [unfilled] [Fair] : fair [Pancreatic Insufficiency] : pancreatic insufficiency [Pancreatic Enzyme Supp.] : uses pancreatic enzyme supplements [CFRD] : CFRD [Date: ___] : The last HgA1C was performed on [unfilled] [HgA1C Value: ___] : HgA1C value was [unfilled]  [AFB] : the patient had a MAC negative AFB [Headache] : no headache [Congestion] : no nasal congestion [Sinus Pain] : no sinus pain [Pancreatitis] : no pancreatitis [Diarrhea] : no diarrhea [Constipation] : no constipation [Steatorrhea] : no steatorrhea [de-identified] : 33 y/o male CF pt who was dx'ed CF at 6 mo old, + sweat, + 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 Pt also has chronic sinusitis and PI.  Seen today in office for  [id number] open label research visit. Pt reporting left ear pain >1 week. [place] has pain prior to trip and was not swimming. Did not take course of Doxy in May. Mild PND but otherwise feels as respiratory baseline cough/sputum minimal with clear/yellow. Some improvement in chronic intermittent loose stools saw GI and put back on Zenpep and feels better than the Pertzye. Had an episode of severe gastric pain last month and was put on PPI he took it for a week or 2 and the symptoms have not returned. they did not recommend EGD. No fevers, chills, sob, wheeze, nausea vomiting, sinus congestion. Has not been doing airway clearance or inhaled antibiotics. He has some weight loss reports appetite is okay but very active at work not exercising.   PULM: Alb/HS/Pulmozyme + Vest  NOT doing Was previously on inhaled Vanco and Cayston with some benefit while off of modulator therapy for sperm extraction in past. (High out of pocket costs not covered) Cayston denied by insurance 2024 Self stopped Advair last yr   Nutrition: Not drinking Glucerna or [employer]. GI: chronic bloating resolved with Metronidazole for short time, Denies GERD off PPI, intermittent loose stools improved on Zenpep.  Left breast mass - US showed fibroglandular tissue noted gynecomastia. Reportedly resolved.  Urology/Conception: absence of Vas Deferens. Had one successful embryo fertilization (daiughter is now 18months) had 2 failed transfers and last embryo was a mosaic pattern. Excessive oop cost for IVF considering in the future.   Sleep- Denies persistent insomnia, no longer needing Ambien.

## 2024-08-22 NOTE — ASSESSMENT
[FreeTextEntry1] : 35 y/o male CF pt who was dx'ed CF at 6 mo old, + sweat, + 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 Pt also has chronic sinusitis and PI. 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 Here today for research visit with left otitis media.   Pt seen for [id number] All protocols and procedures explained. Pt would like to continue to participate in the study.  PULM: - Fev1 83% (8/22/24), 74% (12/18/23), 81% (7/28/23), 81% (6/26/23), 79% (12/15/22), 82% (7/29/22), 79% (6/29/22) 76% (home 6/29/22), 84% (9/20/21), 93%, 76% (home aisha 6/1/21), 59% (5/25/21), 78% (3/30/21 off study drug), 84% (1/8/20), 78% (10/16/20), 81% (7/21/20), 79% (1/13/20), 81% (12/11/19), 81% (11/26/19), 81% (11/13/19), 84% (6/2019) - annual labs done today  - Sputum CS/AFB swab - Reinforced importance of ACT asked to perform at least daily with aerobika  CXR:10/10/20 Reticular opacities and bronchiectasis slight improvement in L mid to lower lung periphery but other wise no significant internal change  LEFT ear TM  - Start Augmentin   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 - Pt was on inhaled vanco (not covered $$) /cayston prior to sperm extraction with good tolerance. Tolerated Bethkis and tobramycin in past, did not tolerate TOBIpod - not covered - Discussed importance of inhaled antibx however limitations with insurance (CAYSTON denied 8/2024)  CARDIO Echo wnl mild mitral regurg 2020 - EKG had elevated polarization in pre-cordial leads cont to monitor  ENDO - CFRD on insulin. Reports glucose levels of 150-200s post meals. Hypovitaminosis D. Few hypoglycemic events. OSTEOPENIA not taking Vit D / Ca - Repeat DEXA due 9/2024 given rx - Cont Vit D 2000Ius - Restart Ca 1200, LWBE Sent rxs - F/u with Endo reinforced today  Nutrition/GI: Tried Creon, Pancreaze and Pertzye. Trialed Metronidazole 9/2022. Some improvement with  Diarrhea/Bloating on Zenpep. Stopped Omeprazole again denies GERD. - f/u rd - will send for GI consults apt.  - Reinforced caloric supplementation continues to lose weight.  - Cont Zenpep 3-4 with meals 1-2 with snacks   Hx of elevated CK on research labs -Abd US 8/2022 wnl - CK re-drawn with research labs today  Left Breast mass increased in size U/S Aug 2022 Noted gynecomastia with fibroglandular tissue. per pt resolved - Referred to Breast Dr Mcgrath never followed up  f.u 3 months

## 2024-08-29 LAB
25(OH)D3 SERPL-MCNC: 31.5 NG/ML
A FLAVUS AB FLD QL: NEGATIVE
A FUMIGATUS AB FLD QL: NEGATIVE
A FUMIGATUS IGE QN: <0.1 KUA/L
A NIGER AB FLD QL: NEGATIVE
A-TOCOPHEROL VIT E SERPL-MCNC: 4 MG/L
ALBUMIN SERPL ELPH-MCNC: 4.8 G/DL
ALP BLD-CCNC: 124 U/L
ALT SERPL-CCNC: 33 U/L
ANION GAP SERPL CALC-SCNC: 10 MMOL/L
AST SERPL-CCNC: 22 U/L
BACTERIA SPT CF RESP CULT: NORMAL
BASOPHILS # BLD AUTO: 0.03 K/UL
BASOPHILS NFR BLD AUTO: 0.6 %
BETA+GAMMA TOCOPHEROL SERPL-MCNC: 1.5 MG/L
BILIRUB SERPL-MCNC: 0.7 MG/DL
BUN SERPL-MCNC: 14 MG/DL
CALCIUM SERPL-MCNC: 10 MG/DL
CHLORIDE SERPL-SCNC: 100 MMOL/L
CHOLEST SERPL-MCNC: 159 MG/DL
CO2 SERPL-SCNC: 27 MMOL/L
CREAT SERPL-MCNC: 0.82 MG/DL
DEPRECATED A FUMIGATUS IGE RAST QL: 0 (ref 0–?)
EGFR: 119 ML/MIN/1.73M2
EOSINOPHIL # BLD AUTO: 0.08 K/UL
EOSINOPHIL NFR BLD AUTO: 1.7 %
ESTIMATED AVERAGE GLUCOSE: 169 MG/DL
GLUCOSE SERPL-MCNC: 143 MG/DL
HBA1C MFR BLD HPLC: 7.5 %
HCT VFR BLD CALC: 44.8 %
HDLC SERPL-MCNC: 39 MG/DL
HGB BLD-MCNC: 14.8 G/DL
IMM GRANULOCYTES NFR BLD AUTO: 0.2 %
LDLC SERPL CALC-MCNC: 102 MG/DL
LYMPHOCYTES # BLD AUTO: 1.24 K/UL
LYMPHOCYTES NFR BLD AUTO: 26.2 %
MAN DIFF?: NORMAL
MCHC RBC-ENTMCNC: 29.6 PG
MCHC RBC-ENTMCNC: 33 GM/DL
MCV RBC AUTO: 89.6 FL
MENADIONE SERPL-MCNC: 0.13 NG/ML
MONOCYTES # BLD AUTO: 0.35 K/UL
MONOCYTES NFR BLD AUTO: 7.4 %
NEUTROPHILS # BLD AUTO: 3.02 K/UL
NEUTROPHILS NFR BLD AUTO: 63.9 %
NONHDLC SERPL-MCNC: 120 MG/DL
PLATELET # BLD AUTO: 239 K/UL
POTASSIUM SERPL-SCNC: 4.8 MMOL/L
PROT SERPL-MCNC: 8 G/DL
RBC # BLD: 5 M/UL
RBC # FLD: 13.6 %
SODIUM SERPL-SCNC: 137 MMOL/L
TOTAL IGE SMQN RAST: 21 KU/L
TRIGL SERPL-MCNC: 93 MG/DL
VIT A SERPL-MCNC: 29 UG/DL
WBC # FLD AUTO: 4.73 K/UL

## 2024-08-31 LAB — RHODAMINE-AURAMINE STN SPEC: NORMAL

## 2024-09-04 ENCOUNTER — NON-APPOINTMENT (OUTPATIENT)
Age: 34
End: 2024-09-04

## 2024-10-11 ENCOUNTER — NON-APPOINTMENT (OUTPATIENT)
Age: 34
End: 2024-10-11

## 2024-10-11 RX ORDER — MINOCYCLINE HYDROCHLORIDE 100 MG/1
100 CAPSULE ORAL TWICE DAILY
Qty: 28 | Refills: 0 | Status: ACTIVE | COMMUNITY
Start: 2024-10-11 | End: 1900-01-01

## 2024-11-01 NOTE — HISTORY OF PRESENT ILLNESS
Refill Routing Note   Medication(s) are not appropriate for processing by [facility] for the following reason(s):        Required vitals outdated    [facility] action(s):  Defer   Requires appointment : Yes     Requires labs : Yes             Appointments  past 12m or future 3m with PCP    Date Provider   Last Visit   8/28/2023 Alan Moss MD   Next Visit   Visit date not found Alan Moss MD   ED visits in past 90 days: 0        Note composed:2:32 PM 11/01/2024            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 [Pseudomonas] : Pseudomonas [Other: ___] : [unfilled] [___] : the last positive Pseudomonas result was [unfilled] [Last AFB Date: ___] : the AFB was performed  [unfilled] [None] : ~He/She~ has no hemoptysis [FVC ___] : the FVC was [unfilled] liters [FEV1: ___] : the FEV1 was [unfilled] liters [] : vest daily [Other ___] : exercise [unfilled] [Fair] : fair [Pancreatic Insufficiency] : pancreatic insufficiency [Pancreatic Enzyme Supp.] : uses pancreatic enzyme supplements [CFRD] : CFRD [Date: ___] : The last HgA1C was performed on [unfilled] [HgA1C Value: ___] : HgA1C value was [unfilled]  [AFB] : the patient had a MAC negative AFB [Headache] : no headache [Congestion] : no nasal congestion [Sinus Pain] : no sinus pain [Pancreatitis] : no pancreatitis [Diarrhea] : no diarrhea [Constipation] : no constipation [Steatorrhea] : no steatorrhea [de-identified] : 32 y/o male CF pt who was dx'ed CF at 6 mo old, + sweat, + 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 Pt also has chronic sinusitis and PI. Hx of COVID-19 12/2021. \par \par Seen today in office for [id number] Week 4 research visit. Reports being at respiratory baseline. Reporting loose stools for about a week, has been happening on and off for several months had negative Cdiff. Fould smelling, watery stools 3-4x a day for several days at a time, bloating. Did not complete the other stool tests nor did he have the abd U/S as recommended. No known associated foods. No recent antibiotics. Taking insulin but has been without yonathan for a while and not checking FSBGs often. Continued weight loss not exercising, stopped protein shake due to elevated CK/LFTs. Noticed Left breast lump a few days ago, slightly tender. No nipple d/c, bleeding, erythema or skin changes. Noticed more frequent hypoglycemic episodes for about 3 weeks now, intermittent episodes 40s, has halved his insulin regimen and now maintaining BGs in 100s. Symptomatic when he was hypoglycemic, sweaty/palps, no loc. Good appetite. No sinus congestion, change cough, sputum, hemoptysis, fevers, chills, sob, wheeze, cp, chest tightness, abd pain, flatulence, nausea.\par \par PULM: Alb/HS/Pulmozyme + Vest less than daily \par Was previously on inhaled Vanco and Cayston with some benefit while off of modulator therapy for sperm extraction in past. (High out of pocket costs not covered) \par \par Nutrition: Using [employer] once a day.  (Takes 4 enzymes with each shake)

## 2024-11-08 ENCOUNTER — RX RENEWAL (OUTPATIENT)
Age: 34
End: 2024-11-08

## 2024-11-11 ENCOUNTER — RX RENEWAL (OUTPATIENT)
Age: 34
End: 2024-11-11

## 2024-11-11 RX ORDER — PEN NEEDLE, DIABETIC 29 G X1/2"
31G X 8 MM NEEDLE, DISPOSABLE MISCELLANEOUS
Qty: 100 | Refills: 2 | Status: ACTIVE | COMMUNITY
Start: 2024-11-11 | End: 1900-01-01

## 2024-11-11 RX ORDER — BLOOD-GLUCOSE TRANSMITTER
EACH MISCELLANEOUS
Qty: 1 | Refills: 3 | Status: ACTIVE | COMMUNITY
Start: 2024-11-11 | End: 1900-01-01

## 2024-11-11 RX ORDER — BLOOD-GLUCOSE SENSOR
EACH MISCELLANEOUS
Qty: 3 | Refills: 3 | Status: ACTIVE | COMMUNITY
Start: 2024-11-11 | End: 1900-01-01

## 2024-11-14 ENCOUNTER — APPOINTMENT (OUTPATIENT)
Dept: PULMONOLOGY | Facility: CLINIC | Age: 34
End: 2024-11-14

## 2024-11-14 ENCOUNTER — APPOINTMENT (OUTPATIENT)
Dept: PULMONOLOGY | Facility: CLINIC | Age: 34
End: 2024-11-14
Payer: COMMERCIAL

## 2024-11-14 ENCOUNTER — NON-APPOINTMENT (OUTPATIENT)
Age: 34
End: 2024-11-14

## 2024-11-14 VITALS
HEIGHT: 65 IN | TEMPERATURE: 36.2 F | BODY MASS INDEX: 22.66 KG/M2 | SYSTOLIC BLOOD PRESSURE: 126 MMHG | DIASTOLIC BLOOD PRESSURE: 77 MMHG | RESPIRATION RATE: 16 BRPM | WEIGHT: 136 LBS | OXYGEN SATURATION: 100 % | HEART RATE: 67 BPM

## 2024-11-14 PROCEDURE — G2211 COMPLEX E/M VISIT ADD ON: CPT | Mod: NC

## 2024-11-14 PROCEDURE — 99215 OFFICE O/P EST HI 40 MIN: CPT

## 2024-11-14 RX ORDER — NUTRITIONAL SUPPLEMENT/FIBER 0.05 G-1.5
LIQUID (ML) ORAL
Qty: 30 | Refills: 6 | Status: ACTIVE | COMMUNITY
Start: 2024-11-14 | End: 1900-01-01

## 2024-11-14 RX ORDER — EMPAGLIFLOZIN 25 MG/1
25 TABLET, FILM COATED ORAL
Qty: 90 | Refills: 1 | Status: DISCONTINUED | COMMUNITY
End: 2024-11-14

## 2024-11-15 ENCOUNTER — NON-APPOINTMENT (OUTPATIENT)
Age: 34
End: 2024-11-15

## 2024-11-19 ENCOUNTER — TRANSCRIPTION ENCOUNTER (OUTPATIENT)
Age: 34
End: 2024-11-19

## 2024-11-21 LAB
A-TOCOPHEROL VIT E SERPL-MCNC: 6.3 MG/L
BACTERIA SPT CF RESP CULT: NORMAL
BETA+GAMMA TOCOPHEROL SERPL-MCNC: 1.4 MG/L

## 2024-11-22 ENCOUNTER — NON-APPOINTMENT (OUTPATIENT)
Age: 34
End: 2024-11-22

## 2024-12-04 ENCOUNTER — NON-APPOINTMENT (OUTPATIENT)
Age: 34
End: 2024-12-04

## 2024-12-10 RX ORDER — DOXYCYCLINE HYCLATE 100 MG/1
100 CAPSULE ORAL
Qty: 28 | Refills: 0 | Status: ACTIVE | COMMUNITY
Start: 2024-12-10 | End: 1900-01-01

## 2024-12-12 ENCOUNTER — APPOINTMENT (OUTPATIENT)
Dept: PULMONOLOGY | Facility: CLINIC | Age: 34
End: 2024-12-12

## 2025-01-16 ENCOUNTER — NON-APPOINTMENT (OUTPATIENT)
Age: 35
End: 2025-01-16

## 2025-01-31 ENCOUNTER — NON-APPOINTMENT (OUTPATIENT)
Age: 35
End: 2025-01-31

## 2025-02-03 ENCOUNTER — APPOINTMENT (OUTPATIENT)
Dept: PULMONOLOGY | Facility: CLINIC | Age: 35
End: 2025-02-03

## 2025-02-03 VITALS
TEMPERATURE: 98.3 F | HEIGHT: 65 IN | OXYGEN SATURATION: 99 % | HEART RATE: 67 BPM | SYSTOLIC BLOOD PRESSURE: 133 MMHG | WEIGHT: 135 LBS | RESPIRATION RATE: 16 BRPM | BODY MASS INDEX: 22.49 KG/M2 | DIASTOLIC BLOOD PRESSURE: 76 MMHG

## 2025-02-07 LAB — BACTERIA SPT CF RESP CULT: NORMAL

## 2025-02-10 ENCOUNTER — RX RENEWAL (OUTPATIENT)
Age: 35
End: 2025-02-10

## 2025-02-10 RX ORDER — INSULIN LISPRO 100 [IU]/ML
100 INJECTION, SOLUTION INTRAVENOUS; SUBCUTANEOUS
Qty: 15 | Refills: 1 | Status: ACTIVE | COMMUNITY
Start: 2025-02-10 | End: 1900-01-01

## 2025-02-12 ENCOUNTER — NON-APPOINTMENT (OUTPATIENT)
Age: 35
End: 2025-02-12

## 2025-02-14 ENCOUNTER — NON-APPOINTMENT (OUTPATIENT)
Age: 35
End: 2025-02-14

## 2025-03-14 ENCOUNTER — NON-APPOINTMENT (OUTPATIENT)
Age: 35
End: 2025-03-14

## 2025-04-11 ENCOUNTER — NON-APPOINTMENT (OUTPATIENT)
Age: 35
End: 2025-04-11

## 2025-04-18 RX ORDER — INSULIN ASPART 100 [IU]/ML
100 INJECTION, SOLUTION INTRAVENOUS; SUBCUTANEOUS
Qty: 90 | Refills: 0 | Status: ACTIVE | COMMUNITY
Start: 2025-04-16 | End: 1900-01-01

## 2025-04-23 ENCOUNTER — OUTPATIENT (OUTPATIENT)
Dept: OUTPATIENT SERVICES | Facility: HOSPITAL | Age: 35
LOS: 1 days | End: 2025-04-23
Payer: COMMERCIAL

## 2025-04-23 ENCOUNTER — APPOINTMENT (OUTPATIENT)
Dept: RADIOLOGY | Facility: CLINIC | Age: 35
End: 2025-04-23

## 2025-04-23 ENCOUNTER — APPOINTMENT (OUTPATIENT)
Dept: CT IMAGING | Facility: CLINIC | Age: 35
End: 2025-04-23

## 2025-04-23 DIAGNOSIS — E84.9 CYSTIC FIBROSIS, UNSPECIFIED: ICD-10-CM

## 2025-04-23 DIAGNOSIS — Z98.890 OTHER SPECIFIED POSTPROCEDURAL STATES: Chronic | ICD-10-CM

## 2025-04-23 PROCEDURE — 71250 CT THORAX DX C-: CPT | Mod: 26

## 2025-04-23 PROCEDURE — 77080 DXA BONE DENSITY AXIAL: CPT | Mod: 26

## 2025-04-23 PROCEDURE — 71250 CT THORAX DX C-: CPT

## 2025-04-23 PROCEDURE — 70486 CT MAXILLOFACIAL W/O DYE: CPT | Mod: 26

## 2025-04-23 PROCEDURE — 70486 CT MAXILLOFACIAL W/O DYE: CPT

## 2025-04-23 PROCEDURE — 77080 DXA BONE DENSITY AXIAL: CPT

## 2025-04-25 ENCOUNTER — NON-APPOINTMENT (OUTPATIENT)
Age: 35
End: 2025-04-25

## 2025-05-02 ENCOUNTER — APPOINTMENT (OUTPATIENT)
Dept: PULMONOLOGY | Facility: CLINIC | Age: 35
End: 2025-05-02

## 2025-05-02 ENCOUNTER — LABORATORY RESULT (OUTPATIENT)
Age: 35
End: 2025-05-02

## 2025-05-02 ENCOUNTER — APPOINTMENT (OUTPATIENT)
Dept: PULMONOLOGY | Facility: CLINIC | Age: 35
End: 2025-05-02
Payer: SUBSIDIZED

## 2025-05-02 DIAGNOSIS — Z00.00 ENCOUNTER FOR GENERAL ADULT MEDICAL EXAMINATION W/OUT ABNORMAL FINDINGS: ICD-10-CM

## 2025-05-02 DIAGNOSIS — E84.9 CYSTIC FIBROSIS, UNSPECIFIED: ICD-10-CM

## 2025-05-02 DIAGNOSIS — K86.89 OTHER SPECIFIED DISEASES OF PANCREAS: ICD-10-CM

## 2025-05-02 PROCEDURE — G2211 COMPLEX E/M VISIT ADD ON: CPT

## 2025-05-02 PROCEDURE — 99215 OFFICE O/P EST HI 40 MIN: CPT

## 2025-05-05 ENCOUNTER — NON-APPOINTMENT (OUTPATIENT)
Age: 35
End: 2025-05-05

## 2025-05-07 ENCOUNTER — NON-APPOINTMENT (OUTPATIENT)
Age: 35
End: 2025-05-07

## 2025-05-07 LAB
25(OH)D3 SERPL-MCNC: 19.8 NG/ML
A FLAVUS AB FLD QL: NEGATIVE
A FUMIGATUS AB FLD QL: NEGATIVE
A FUMIGATUS IGE QN: <0.1 KUA/L
A NIGER AB FLD QL: NEGATIVE
A-TOCOPHEROL VIT E SERPL-MCNC: 5.9 MG/L
BACTERIA SPT CF RESP CULT: NORMAL
BETA+GAMMA TOCOPHEROL SERPL-MCNC: 1.3 MG/L
CHOLEST SERPL-MCNC: 144 MG/DL
DEPRECATED A FUMIGATUS IGE RAST QL: 0
ESTIMATED AVERAGE GLUCOSE: 180 MG/DL
HBA1C MFR BLD HPLC: 7.9 %
HDLC SERPL-MCNC: 36 MG/DL
LDLC SERPL-MCNC: 95 MG/DL
NONHDLC SERPL-MCNC: 108 MG/DL
TOTAL IGE SMQN RAST: 38 KU/L
TRIGL SERPL-MCNC: 65 MG/DL
VIT A SERPL-MCNC: 22.5 UG/DL

## 2025-05-13 ENCOUNTER — NON-APPOINTMENT (OUTPATIENT)
Age: 35
End: 2025-05-13

## 2025-05-15 RX ORDER — INSULIN PMP CART,AUT,G6/7,CNTR
EACH SUBCUTANEOUS
Qty: 1 | Refills: 0 | Status: ACTIVE | COMMUNITY
Start: 2025-05-14 | End: 1900-01-01

## 2025-05-15 RX ORDER — INSULIN PMP CART,AUT,G6/7,CNTR
EACH SUBCUTANEOUS
Qty: 6 | Refills: 3 | Status: ACTIVE | COMMUNITY
Start: 2025-05-14 | End: 1900-01-01

## 2025-05-27 ENCOUNTER — APPOINTMENT (OUTPATIENT)
Dept: ENDOCRINOLOGY | Facility: CLINIC | Age: 35
End: 2025-05-27
Payer: COMMERCIAL

## 2025-05-27 VITALS
DIASTOLIC BLOOD PRESSURE: 68 MMHG | SYSTOLIC BLOOD PRESSURE: 126 MMHG | WEIGHT: 132 LBS | HEART RATE: 85 BPM | BODY MASS INDEX: 21.97 KG/M2 | OXYGEN SATURATION: 98 %

## 2025-05-27 DIAGNOSIS — E08.9 CYSTIC FIBROSIS WITH OTHER MANIFESTATIONS: ICD-10-CM

## 2025-05-27 DIAGNOSIS — K86.89 OTHER SPECIFIED DISEASES OF PANCREAS: ICD-10-CM

## 2025-05-27 DIAGNOSIS — E84.8 CYSTIC FIBROSIS WITH OTHER MANIFESTATIONS: ICD-10-CM

## 2025-05-27 PROCEDURE — 99214 OFFICE O/P EST MOD 30 MIN: CPT

## 2025-05-27 PROCEDURE — G2211 COMPLEX E/M VISIT ADD ON: CPT | Mod: NC

## 2025-05-27 RX ORDER — PEN NEEDLE, DIABETIC 32GX 5/32"
32G X 4 MM NEEDLE, DISPOSABLE MISCELLANEOUS
Qty: 4 | Refills: 3 | Status: ACTIVE | COMMUNITY
Start: 2025-05-27 | End: 1900-01-01

## 2025-06-03 ENCOUNTER — NON-APPOINTMENT (OUTPATIENT)
Age: 35
End: 2025-06-03

## 2025-06-20 RX ORDER — BLOOD-GLUCOSE SENSOR
EACH MISCELLANEOUS
Qty: 9 | Refills: 3 | Status: ACTIVE | COMMUNITY
Start: 2025-06-20 | End: 1900-01-01

## 2025-06-23 ENCOUNTER — APPOINTMENT (OUTPATIENT)
Dept: ENDOCRINOLOGY | Facility: CLINIC | Age: 35
End: 2025-06-23

## 2025-07-23 ENCOUNTER — APPOINTMENT (OUTPATIENT)
Dept: PULMONOLOGY | Facility: CLINIC | Age: 35
End: 2025-07-23

## 2025-07-23 VITALS
RESPIRATION RATE: 16 BRPM | HEART RATE: 76 BPM | BODY MASS INDEX: 22.33 KG/M2 | HEIGHT: 65 IN | OXYGEN SATURATION: 99 % | DIASTOLIC BLOOD PRESSURE: 79 MMHG | TEMPERATURE: 98.4 F | SYSTOLIC BLOOD PRESSURE: 131 MMHG | WEIGHT: 134 LBS

## 2025-07-30 ENCOUNTER — APPOINTMENT (OUTPATIENT)
Dept: ENDOCRINOLOGY | Facility: CLINIC | Age: 35
End: 2025-07-30